# Patient Record
Sex: FEMALE | Race: WHITE | NOT HISPANIC OR LATINO | Employment: FULL TIME | URBAN - METROPOLITAN AREA
[De-identification: names, ages, dates, MRNs, and addresses within clinical notes are randomized per-mention and may not be internally consistent; named-entity substitution may affect disease eponyms.]

---

## 2017-01-31 ENCOUNTER — ANTICOAGULATION THERAPY VISIT (OUTPATIENT)
Dept: ANTICOAGULATION | Facility: CLINIC | Age: 42
End: 2017-01-31
Payer: COMMERCIAL

## 2017-01-31 DIAGNOSIS — Z79.01 LONG TERM CURRENT USE OF ANTICOAGULANT: ICD-10-CM

## 2017-01-31 DIAGNOSIS — I63.50 CEREBRAL ARTERY OCCLUSION WITH CEREBRAL INFARCTION (H): Primary | ICD-10-CM

## 2017-01-31 DIAGNOSIS — D68.59 PRIMARY HYPERCOAGULABLE STATE (H): ICD-10-CM

## 2017-01-31 LAB — INR POINT OF CARE: 1.6 (ref 0.86–1.14)

## 2017-01-31 PROCEDURE — 99207 ZZC NO CHARGE NURSE ONLY: CPT

## 2017-01-31 PROCEDURE — 85610 PROTHROMBIN TIME: CPT | Mod: QW

## 2017-01-31 PROCEDURE — 36416 COLLJ CAPILLARY BLOOD SPEC: CPT

## 2017-01-31 NOTE — PROGRESS NOTES
ANTICOAGULATION FOLLOW-UP CLINIC VISIT    Patient Name:  Belkis Christopher  Date:  1/31/2017  Contact Type:  Face to Face    SUBJECTIVE:     Patient Findings     Positives Missed doses (1-23 and 1-24)    Comments Patient was out of town in [place] early last week and forgot to take her warfarin twice in a row. She had it with her but just did not take it. This patient has an extensive history of missed doses, especially when traveling. Writer reminded patient, again, the importance of taking her warfarin on a daily basis.           OBJECTIVE    INR PROTIME   Date Value Ref Range Status   01/31/2017 1.6* 0.86 - 1.14 Final       ASSESSMENT / PLAN  INR assessment SUB missed doses   Recheck INR In: 3 WEEKS    INR Location Clinic      Anticoagulation Summary as of 1/31/2017     INR goal 2.0-3.0   Selected INR 1.6! (1/31/2017)   Maintenance plan 5 mg (5 mg x 1) every day   Full instructions 1/31: 7.5 mg; 2/1: 7.5 mg; Otherwise 5 mg every day   Weekly total 35 mg   Plan last modified Fallon Huber [facility] (6/23/2015)   Next INR check 2/21/2017   Priority INR   Target end date Indefinite    Indications   Long term current use of anticoagulant [Z79.01]  Cerebral artery occlusion with cerebral infarction (H) [I63.50]  Primary hypercoagulable state (H) [D68.59]         Anticoagulation Episode Summary     INR check location     Preferred lab     Send INR reminders to [facility]    Comments * CVA/TIA 2000 (carotid artery), also FVL (hetero)      Anticoagulation Care Providers     Provider Role Specialty Phone number    ShahAdileneKarina Sara,  [facility] Internal Medicine [phone]            See the Encounter Report to view Anticoagulation Flowsheet and Dosing Calendar (Go to Encounters tab in chart review, and find the Anticoagulation Therapy Visit)        Deb Roman RN

## 2017-01-31 NOTE — MR AVS SNAPSHOT
Belkis Ashwin   1/31/2017 5:00 PM   Anticoagulation Therapy Visit    Description:  41 year old female   Provider:  [facility]   Department:  [facility]           INR as of 1/31/2017     Selected INR 1.6! (1/31/2017)      Anticoagulation Summary as of 1/31/2017     INR goal 2.0-3.0   Selected INR 1.6! (1/31/2017)   Full instructions 1/31: 7.5 mg; 2/1: 7.5 mg; Otherwise 5 mg every day   Next INR check 2/21/2017    Indications   Long term current use of anticoagulant [Z79.01]  Cerebral artery occlusion with cerebral infarction (H) [I63.50]  Primary hypercoagulable state (H) [D68.59]         Description     Take 7.5 mg today and tomorrow. Then resume regular dosing. Remember to take warfarin consistently every day      Contact Numbers     Please call [phone] to cancel and/or reschedule your appointment.  Please call [phone] with any problems or questions regarding your therapy          January 2017 Details    Sun Mon Tue Wed u Fri Sat     1               2               3               4               5               6               7                 8               9               10               11               12               13               14                 15               16               17               18               19               20               21                 22               23               24               25               26               27               28                 29               30               31      7.5 mg   See details           Date Details   01/31 This INR check               How to take your warfarin dose     To take:  7.5 mg Take 1.5 of the 5 mg tablets.           February 2017 Details    Sun Mon Tue Wed Thu Fri Sat        1      7.5 mg         2      5 mg         3      5 mg         4      5 mg           5      5 mg         6      5 mg         7      5 mg         8      5 mg         9      5 mg         10      5 mg         11      5 mg           12       5 mg         13      5 mg         14      5 mg         15      5 mg         16      5 mg         17      5 mg         18      5 mg           19      5 mg         20      5 mg         21            22               23               24               25                 26               27               28                    Date Details   No additional details    Date of next INR:  2/21/2017         How to take your warfarin dose     To take:  5 mg Take 1 of the 5 mg tablets.    To take:  7.5 mg Take 1.5 of the 5 mg tablets.

## 2017-02-21 ENCOUNTER — ANTICOAGULATION THERAPY VISIT (OUTPATIENT)
Dept: ANTICOAGULATION | Facility: CLINIC | Age: 42
End: 2017-02-21
Payer: COMMERCIAL

## 2017-02-21 DIAGNOSIS — I63.50 CEREBRAL ARTERY OCCLUSION WITH CEREBRAL INFARCTION (H): ICD-10-CM

## 2017-02-21 DIAGNOSIS — D68.59 PRIMARY HYPERCOAGULABLE STATE (H): ICD-10-CM

## 2017-02-21 DIAGNOSIS — Z79.01 LONG TERM CURRENT USE OF ANTICOAGULANT: ICD-10-CM

## 2017-02-21 LAB — INR POINT OF CARE: 3 (ref 0.86–1.14)

## 2017-02-21 PROCEDURE — 99207 ZZC NO CHARGE NURSE ONLY: CPT

## 2017-02-21 PROCEDURE — 36416 COLLJ CAPILLARY BLOOD SPEC: CPT

## 2017-02-21 PROCEDURE — 85610 PROTHROMBIN TIME: CPT | Mod: QW

## 2017-02-21 NOTE — MR AVS SNAPSHOT
Belkis Christopher   2/21/2017 4:45 PM   Anticoagulation Therapy Visit    Description:  41 year old female   Provider:  [facility]   Department:  [facility]           INR as of 2/21/2017     Today's INR 3.0      Anticoagulation Summary as of 2/21/2017     INR goal 2.0-3.0   Today's INR 3.0   Full instructions 5 mg every day   Next INR check 4/4/2017    Indications   Long term current use of anticoagulant [Z79.01]  Cerebral artery occlusion with cerebral infarction (H) [I63.50]  Primary hypercoagulable state (H) [D68.59]         Your next Anticoagulation Clinic appointment(s)     Feb 21, 2017  4:45 PM CST   Anticoagulation Visit with [facility]   [facility]    [address] [address] [phone]              Contact Numbers     Please call [phone] to cancel and/or reschedule your appointment.  Please call [phone] with any problems or questions regarding your therapy          February 2017 Details    Sun Mon Tue Wed Thu Fri Sat        1               2               3               4                 5               6               7               8               9               10               11                 12               13               14               15               16               17               18                 19               20               21      5 mg   See details      22      5 mg         23      5 mg         24      5 mg         25      5 mg           26      5 mg         27      5 mg         28      5 mg              Date Details   02/21 This INR check               How to take your warfarin dose     To take:  5 mg Take 1 of the 5 mg tablets.           March 2017 Details    Sun Mon Tue Wed Thu Fri Sat        1      5 mg         2      5 mg         3      5 mg         4      5 mg           5      5 mg         6      5 mg         7      5 mg         8      5 mg         9      5 mg         10      5 mg          11      5 mg           12      5 mg         13      5 mg         14      5 mg         15      5 mg         16      5 mg         17      5 mg         18      5 mg           19      5 mg         20      5 mg         21      5 mg         22      5 mg         23      5 mg         24      5 mg         25      5 mg           26      5 mg         27      5 mg         28      5 mg         29      5 mg         30      5 mg         31      5 mg           Date Details   No additional details            How to take your warfarin dose     To take:  5 mg Take 1 of the 5 mg tablets.           April 2017 Details    Sun Mon Tue Wed Thu Fri Sat           1      5 mg           2      5 mg         3      5 mg         4            5               6               7               8                 9               10               11               12               13               14               15                 16               17               18               19               20               21               22                 23               24               25               26               27               28               29                 30                      Date Details   No additional details    Date of next INR:  4/4/2017         How to take your warfarin dose     To take:  5 mg Take 1 of the 5 mg tablets.

## 2017-02-21 NOTE — PROGRESS NOTES
ANTICOAGULATION FOLLOW-UP CLINIC VISIT    Patient Name:  Belkis Christopher  Date:  2/21/2017  Contact Type:  Face to Face    SUBJECTIVE:     Patient Findings     Positives Missed doses (2-18-17)           OBJECTIVE    INR Protime   Date Value Ref Range Status   02/21/2017 3.0 (A) 0.86 - 1.14 Final       ASSESSMENT / PLAN  INR assessment THER    Recheck INR In: 6 WEEKS    INR Location Clinic      Anticoagulation Summary as of 2/21/2017     INR goal 2.0-3.0   Today's INR 3.0   Maintenance plan 5 mg (5 mg x 1) every day   Full instructions 5 mg every day   Weekly total 35 mg   No change documented Deb Roman, RN   Plan last modified Fallon Huber [facility] (6/23/2015)   Next INR check 4/4/2017   Priority INR   Target end date Indefinite    Indications   Long term current use of anticoagulant [Z79.01]  Cerebral artery occlusion with cerebral infarction (H) [I63.50]  Primary hypercoagulable state (H) [D68.59]         Anticoagulation Episode Summary     INR check location     Preferred lab     Send INR reminders to [facility]    Comments * CVA/TIA 2000 (carotid artery), also FVL (hetero)      Anticoagulation Care Providers     Provider Role Specialty Phone number    Pablo Karina Shahrzad,  [facility] Internal Medicine [phone]            See the Encounter Report to view Anticoagulation Flowsheet and Dosing Calendar (Go to Encounters tab in chart review, and find the Anticoagulation Therapy Visit)        Deb Roman RN

## 2017-04-04 ENCOUNTER — ANTICOAGULATION THERAPY VISIT (OUTPATIENT)
Dept: ANTICOAGULATION | Facility: CLINIC | Age: 42
End: 2017-04-04
Payer: COMMERCIAL

## 2017-04-04 DIAGNOSIS — Z79.01 LONG TERM CURRENT USE OF ANTICOAGULANT: ICD-10-CM

## 2017-04-04 DIAGNOSIS — D68.59 PRIMARY HYPERCOAGULABLE STATE (H): ICD-10-CM

## 2017-04-04 DIAGNOSIS — I63.50 CEREBRAL ARTERY OCCLUSION WITH CEREBRAL INFARCTION (H): ICD-10-CM

## 2017-04-04 LAB — INR POINT OF CARE: 3.2 (ref 0.86–1.14)

## 2017-04-04 PROCEDURE — 99207 ZZC NO CHARGE NURSE ONLY: CPT

## 2017-04-04 PROCEDURE — 85610 PROTHROMBIN TIME: CPT | Mod: QW

## 2017-04-04 PROCEDURE — 36416 COLLJ CAPILLARY BLOOD SPEC: CPT

## 2017-04-04 NOTE — PROGRESS NOTES
ANTICOAGULATION FOLLOW-UP CLINIC VISIT    Patient Name:  Belkis Christopher  Date:  4/4/2017  Contact Type:  Face to Face    SUBJECTIVE:     Patient Findings     Positives Other complaints (neck is bothering her. she might have slept on it funny but is having difficulty moving her head normally due to pain)    Comments Patient will eat some greens tonight.           OBJECTIVE    INR Protime   Date Value Ref Range Status   04/04/2017 3.2 (A) 0.86 - 1.14 Final       ASSESSMENT / PLAN  INR assessment SUPRA    Recheck INR In: 6 WEEKS    INR Location Clinic      Anticoagulation Summary as of 4/4/2017     INR goal 2.0-3.0   Today's INR 3.2!   Maintenance plan 5 mg (5 mg x 1) every day   Full instructions 5 mg every day   Weekly total 35 mg   No change documented Deb Roman, RN   Plan last modified Fallon Huber[facility] (6/23/2015)   Next INR check 5/16/2017   Priority INR   Target end date Indefinite    Indications   Long term current use of anticoagulant [Z79.01]  Cerebral artery occlusion with cerebral infarction (H) [I63.50]  Primary hypercoagulable state (H) [D68.59]         Anticoagulation Episode Summary     INR check location     Preferred lab     Send INR reminders to [facility]    Comments * CVA/TIA 2000 (carotid artery), also FVL (hetero)      Anticoagulation Care Providers     Provider Role Specialty Phone number    ShahKarina dhillon,  [facility] Internal Medicine [phone]            See the Encounter Report to view Anticoagulation Flowsheet and Dosing Calendar (Go to Encounters tab in chart review, and find the Anticoagulation Therapy Visit)        Deb Roman RN

## 2017-04-04 NOTE — MR AVS SNAPSHOT
Belkisbuck Christopher   4/4/2017 4:45 PM   Anticoagulation Therapy Visit    Description:  41 year old female   Provider:  [facility]   Department:  [facility]           INR as of 4/4/2017     Today's INR 3.2!      Anticoagulation Summary as of 4/4/2017     INR goal 2.0-3.0   Today's INR 3.2!   Full instructions 5 mg every day   Next INR check 5/16/2017    Indications   Long term current use of anticoagulant [Z79.01]  Cerebral artery occlusion with cerebral infarction (H) [I63.50]  Primary hypercoagulable state (H) [D68.59]         Contact Numbers     Please call [phone] to cancel and/or reschedule your appointment.  Please call [phone] with any problems or questions regarding your therapy          April 2017 Details    Sun Mon Tue Wed Thu Fri Sat           1                 2               3               4      5 mg   See details      5      5 mg         6      5 mg         7      5 mg         8      5 mg           9      5 mg         10      5 mg         11      5 mg         12      5 mg         13      5 mg         14      5 mg         15      5 mg           16      5 mg         17      5 mg         18      5 mg         19      5 mg         20      5 mg         21      5 mg         22      5 mg           23      5 mg         24      5 mg         25      5 mg         26      5 mg         27      5 mg         28      5 mg         29      5 mg           30      5 mg                Date Details   04/04 This INR check               How to take your warfarin dose     To take:  5 mg Take 1 of the 5 mg tablets.           May 2017 Details    Sun Mon Tue Wed Thu Fri Sat      1      5 mg         2      5 mg         3      5 mg         4      5 mg         5      5 mg         6      5 mg           7      5 mg         8      5 mg         9      5 mg         10      5 mg         11      5 mg         12      5 mg         13      5 mg           14      5 mg         15      5 mg         16            17               18                19               20                 21               22               23               24               25               26               27                 28               29               30               31                   Date Details   No additional details    Date of next INR:  5/16/2017         How to take your warfarin dose     To take:  5 mg Take 1 of the 5 mg tablets.

## 2017-05-16 ENCOUNTER — ANTICOAGULATION THERAPY VISIT (OUTPATIENT)
Dept: ANTICOAGULATION | Facility: CLINIC | Age: 42
End: 2017-05-16
Payer: COMMERCIAL

## 2017-05-16 DIAGNOSIS — D68.59 PRIMARY HYPERCOAGULABLE STATE (H): ICD-10-CM

## 2017-05-16 DIAGNOSIS — Z79.01 LONG TERM CURRENT USE OF ANTICOAGULANT: ICD-10-CM

## 2017-05-16 DIAGNOSIS — I63.50 CEREBRAL ARTERY OCCLUSION WITH CEREBRAL INFARCTION (H): ICD-10-CM

## 2017-05-16 LAB — INR POINT OF CARE: 2.5 (ref 0.86–1.14)

## 2017-05-16 PROCEDURE — 85610 PROTHROMBIN TIME: CPT | Mod: QW

## 2017-05-16 PROCEDURE — 99207 ZZC NO CHARGE NURSE ONLY: CPT

## 2017-05-16 PROCEDURE — 36416 COLLJ CAPILLARY BLOOD SPEC: CPT

## 2017-05-16 NOTE — MR AVS SNAPSHOT
Belkis Ashwin   5/16/2017 4:45 PM   Anticoagulation Therapy Visit    Description:  41 year old female   Provider:  [facility]   Department:  [facility]           INR as of 5/16/2017     Today's INR 2.5      Anticoagulation Summary as of 5/16/2017     INR goal 2.0-3.0   Today's INR 2.5   Full instructions 5 mg every day   Next INR check 6/27/2017    Indications   Long term current use of anticoagulant [Z79.01]  Cerebral artery occlusion with cerebral infarction (H) [I63.50]  Primary hypercoagulable state (H) [D68.59]         Contact Numbers     Please call [phone] to cancel and/or reschedule your appointment.  Please call [phone] with any problems or questions regarding your therapy          May 2017 Details    Sun Mon Tue Wed Thu Fri Sat      1               2               3               4               5               6                 7               8               9               10               11               12               13                 14               15               16      5 mg   See details      17      5 mg         18      5 mg         19      5 mg         20      5 mg           21      5 mg         22      5 mg         23      5 mg         24      5 mg         25      5 mg         26      5 mg         27      5 mg           28      5 mg         29      5 mg         30      5 mg         31      5 mg             Date Details   05/16 This INR check               How to take your warfarin dose     To take:  5 mg Take 1 of the 5 mg tablets.           June 2017 Details    Sun Mon Tue Wed Thu Fri Sat         1      5 mg         2      5 mg         3      5 mg           4      5 mg         5      5 mg         6      5 mg         7      5 mg         8      5 mg         9      5 mg         10      5 mg           11      5 mg         12      5 mg         13      5 mg         14      5 mg         15      5 mg         16      5 mg         17      5 mg           18      5 mg         19       5 mg         20      5 mg         21      5 mg         22      5 mg         23      5 mg         24      5 mg           25      5 mg         26      5 mg         27            28               29               30                 Date Details   No additional details    Date of next INR:  6/27/2017         How to take your warfarin dose     To take:  5 mg Take 1 of the 5 mg tablets.

## 2017-05-16 NOTE — PROGRESS NOTES
ANTICOAGULATION FOLLOW-UP CLINIC VISIT    Patient Name:  Belkis Christopher  Date:  5/16/2017  Contact Type:  Face to Face    SUBJECTIVE:     Patient Findings     Positives Missed doses (patient forgot a dose last week and took an extra 2.5 mg the next day to help make up for the missed dose. Not sure what day was missed)           OBJECTIVE    INR Protime   Date Value Ref Range Status   05/16/2017 2.5 (A) 0.86 - 1.14 Final       ASSESSMENT / PLAN  INR assessment THER    Recheck INR In: 6 WEEKS    INR Location Clinic      Anticoagulation Summary as of 5/16/2017     INR goal 2.0-3.0   Today's INR 2.5   Maintenance plan 5 mg (5 mg x 1) every day   Full instructions 5 mg every day   Weekly total 35 mg   No change documented Deb Roman RN   Plan last modified Fallon Huber [facility] (6/23/2015)   Next INR check 6/27/2017   Priority INR   Target end date Indefinite    Indications   Long term current use of anticoagulant [Z79.01]  Cerebral artery occlusion with cerebral infarction (H) [I63.50]  Primary hypercoagulable state (H) [D68.59]         Anticoagulation Episode Summary     INR check location     Preferred lab     Send INR reminders to [facility]    Comments * CVA/TIA 2000 (carotid artery), also FVL (hetero)      Anticoagulation Care Providers     Provider Role Specialty Phone number    Karina Shah DO [facility] Internal Medicine [phone]            See the Encounter Report to view Anticoagulation Flowsheet and Dosing Calendar (Go to Encounters tab in chart review, and find the Anticoagulation Therapy Visit)        Deb Roman RN

## 2017-06-01 DIAGNOSIS — Z79.01 LONG TERM CURRENT USE OF ANTICOAGULANT: ICD-10-CM

## 2017-06-01 RX ORDER — WARFARIN SODIUM 5 MG/1
TABLET ORAL
Qty: 90 TABLET | Refills: 0 | Status: SHIPPED | OUTPATIENT
Start: 2017-06-01 | End: 2017-10-31

## 2017-06-01 NOTE — TELEPHONE ENCOUNTER
JANTOVEN 5MG    Last Written Prescription Date: 10/25/2016  Last Fill Qty: 10/26/2016, # refills: 0  Last Office Visit with G, P or [facility] prescribing provider: 12/13/2016       Date and Result of Last PT/INR:   Lab Results   Component Value Date    INR 2.5 05/16/2017    INR 3.2 04/04/2017    INR 2.9 08/17/2015        Liliane Jimenez  [facility]  [phone]

## 2017-06-27 ENCOUNTER — ANTICOAGULATION THERAPY VISIT (OUTPATIENT)
Dept: ANTICOAGULATION | Facility: CLINIC | Age: 42
End: 2017-06-27
Payer: COMMERCIAL

## 2017-06-27 DIAGNOSIS — D68.59 PRIMARY HYPERCOAGULABLE STATE (H): ICD-10-CM

## 2017-06-27 DIAGNOSIS — Z79.01 LONG TERM CURRENT USE OF ANTICOAGULANT: ICD-10-CM

## 2017-06-27 DIAGNOSIS — I63.50 CEREBRAL ARTERY OCCLUSION WITH CEREBRAL INFARCTION (H): ICD-10-CM

## 2017-06-27 LAB — INR POINT OF CARE: 2.1 (ref 0.86–1.14)

## 2017-06-27 PROCEDURE — 36416 COLLJ CAPILLARY BLOOD SPEC: CPT

## 2017-06-27 PROCEDURE — 99207 ZZC NO CHARGE NURSE ONLY: CPT

## 2017-06-27 PROCEDURE — 85610 PROTHROMBIN TIME: CPT | Mod: QW

## 2017-06-27 NOTE — MR AVS SNAPSHOT
Belkis Christopher   6/27/2017 4:45 PM   Anticoagulation Therapy Visit    Description:  41 year old female   Provider:  [facility]   Department:  [facility]           INR as of 6/27/2017     Today's INR 2.1      Anticoagulation Summary as of 6/27/2017     INR goal 2.0-3.0   Today's INR 2.1   Full instructions 5 mg every day   Next INR check 8/8/2017    Indications   Long term current use of anticoagulant [Z79.01]  Cerebral artery occlusion with cerebral infarction (H) [I63.50]  Primary hypercoagulable state (H) [D68.59]         Description     No change, recheck INR in 6 weeks.      Your next Anticoagulation Clinic appointment(s)     Aug 08, 2017  4:45 PM CDT   [facility]agulation [facility] with [facility]   [facility]    [address] [address] [phone]              Contact Numbers     Please call [phone] to cancel and/or reschedule your appointment.  Please call [phone] with any problems or questions regarding your therapy          June 2017 Details    Sun Mon Tue Wed Thu Fri Sat         1               2               3                 4               5               6               7               8               9               10                 11               12               13               14               15               16               17                 18               19               20               21               22               23               24                 25               26               27      5 mg   See details      28      5 mg         29      5 mg         30      5 mg           Date Details   06/27 This INR check               How to take your warfarin dose     To take:  5 mg Take 1 of the 5 mg tablets.           July 2017 Details    Sun Mon Tue Wed Thu Fri Sat           1      5 mg           2      5 mg         3      5 mg         4      5 mg         5      5 mg         6      5 mg         7      5  mg         8      5 mg           9      5 mg         10      5 mg         11      5 mg         12      5 mg         13      5 mg         14      5 mg         15      5 mg           16      5 mg         17      5 mg         18      5 mg         19      5 mg         20      5 mg         21      5 mg         22      5 mg           23      5 mg         24      5 mg         25      5 mg         26      5 mg         27      5 mg         28      5 mg         29      5 mg           30      5 mg         31      5 mg               Date Details   No additional details            How to take your warfarin dose     To take:  5 mg Take 1 of the 5 mg tablets.           August 2017 Details    Sun Mon Tue Wed Thu Fri Sat       1      5 mg         2      5 mg         3      5 mg         4      5 mg         5      5 mg           6      5 mg         7      5 mg         8            9               10               11               12                 13               14               15               16               17               18               19                 20               21               22               23               24               25               26                 27               28               29               30               31                  Date Details   No additional details    Date of next INR:  8/8/2017         How to take your warfarin dose     To take:  5 mg Take 1 of the 5 mg tablets.

## 2017-06-27 NOTE — PROGRESS NOTES
ANTICOAGULATION FOLLOW-UP CLINIC VISIT    Patient Name:  Belkis Christopher  Date:  6/27/2017  Contact Type:  Face to Face    SUBJECTIVE:     Patient Findings     Positives No Problem Findings (none reported)    Comments Med review done today.             OBJECTIVE    INR Protime   Date Value Ref Range Status   06/27/2017 2.1 (A) 0.86 - 1.14 Final       ASSESSMENT / PLAN  INR assessment THER    Recheck INR In: 6 WEEKS    INR Location Clinic      Anticoagulation Summary as of 6/27/2017     INR goal 2.0-3.0   Today's INR 2.1   Maintenance plan 5 mg (5 mg x 1) every day   Full instructions 5 mg every day   Weekly total 35 mg   No change documented Nidhi Mahan RN   Plan last modified Fallon Huber [facility] (6/23/2015)   Next INR check 8/8/2017   Priority INR   Target end date Indefinite    Indications   Long term current use of anticoagulant [Z79.01]  Cerebral artery occlusion with cerebral infarction (H) [I63.50]  Primary hypercoagulable state (H) [D68.59]         Anticoagulation Episode Summary     INR check location     Preferred lab     Send INR reminders to [facility]    Comments * CVA/TIA 2000 (carotid artery), also FVL (hetero)      Anticoagulation Care Providers     Provider Role Specialty Phone number    Karina Shah Shahrzad,  [facility] Internal Medicine [phone]            See the Encounter Report to view Anticoagulation Flowsheet and Dosing Calendar (Go to Encounters tab in chart review, and find the Anticoagulation Therapy Visit)    Nidhi Mahan, GEORGE

## 2017-07-28 DIAGNOSIS — E55.9 VITAMIN D DEFICIENCY: ICD-10-CM

## 2017-07-28 NOTE — TELEPHONE ENCOUNTER
VITAMIN D3 5000 UNIT      Last Written Prescription Date: 05/18/2016  Last Fill Quantity: 90,  # refills: 0   Last Office Visit with [facility], [facility] or [facility] prescribing provider: 12/13/2016    Liliane Jimenez  [facility]  [phone]

## 2017-08-15 ENCOUNTER — ANTICOAGULATION THERAPY VISIT (OUTPATIENT)
Dept: ANTICOAGULATION | Facility: CLINIC | Age: 42
End: 2017-08-15
Payer: COMMERCIAL

## 2017-08-15 DIAGNOSIS — I63.50 CEREBRAL ARTERY OCCLUSION WITH CEREBRAL INFARCTION (H): ICD-10-CM

## 2017-08-15 DIAGNOSIS — D68.59 PRIMARY HYPERCOAGULABLE STATE (H): ICD-10-CM

## 2017-08-15 DIAGNOSIS — Z79.01 LONG TERM CURRENT USE OF ANTICOAGULANT: ICD-10-CM

## 2017-08-15 LAB — INR POINT OF CARE: 2.2 (ref 0.86–1.14)

## 2017-08-15 PROCEDURE — 85610 PROTHROMBIN TIME: CPT | Mod: QW

## 2017-08-15 PROCEDURE — 99207 ZZC NO CHARGE NURSE ONLY: CPT

## 2017-08-15 PROCEDURE — 36416 COLLJ CAPILLARY BLOOD SPEC: CPT

## 2017-08-15 NOTE — MR AVS SNAPSHOT
Belkis Ashwin   8/15/2017 4:30 PM   Anticoagulation Therapy Visit    Description:  41 year old female   Provider:  [facility]   Department:  [facility]           INR as of 8/15/2017     Today's INR 2.2      Anticoagulation Summary as of 8/15/2017     INR goal 2.0-3.0   Today's INR 2.2   Full instructions 5 mg every day   Next INR check 9/26/2017    Indications   Long term current use of anticoagulant [Z79.01]  Cerebral artery occlusion with cerebral infarction (H) [I63.50]  Primary hypercoagulable state (H) [D68.59]         Contact Numbers     Please call [phone] to cancel and/or reschedule your appointment.  Please call [phone] with any problems or questions regarding your therapy          August 2017 Details    Sun Mon Tue Wed Thu Fri Sat       1               2               3               4               5                 6               7               8               9               10               11               12                 13               14               15      5 mg   See details      16      5 mg         17      5 mg         18      5 mg         19      5 mg           20      5 mg         21      5 mg         22      5 mg         23      5 mg         24      5 mg         25      5 mg         26      5 mg           27      5 mg         28      5 mg         29      5 mg         30      5 mg         31      5 mg            Date Details   08/15 This INR check               How to take your warfarin dose     To take:  5 mg Take 1 of the 5 mg tablets.           September 2017 Details    Sun Mon Tue Wed Thu Fri Sat          1      5 mg         2      5 mg           3      5 mg         4      5 mg         5      5 mg         6      5 mg         7      5 mg         8      5 mg         9      5 mg           10      5 mg         11      5 mg         12      5 mg         13      5 mg         14      5 mg         15      5 mg         16      5 mg           17      5 mg         18      5  mg         19      5 mg         20      5 mg         21      5 mg         22      5 mg         23      5 mg           24      5 mg         25      5 mg         26            27               28               29               30                Date Details   No additional details    Date of next INR:  9/26/2017         How to take your warfarin dose     To take:  5 mg Take 1 of the 5 mg tablets.

## 2017-08-15 NOTE — PROGRESS NOTES
ANTICOAGULATION FOLLOW-UP CLINIC VISIT    Patient Name:  Belkis Christopher  Date:  8/15/2017  Contact Type:  Face to Face    SUBJECTIVE:     Patient Findings     Positives Missed doses (8-8 and 8-9)           OBJECTIVE    INR Protime   Date Value Ref Range Status   08/15/2017 2.2 (A) 0.86 - 1.14 Final       ASSESSMENT / PLAN  INR assessment THER    Recheck INR In: 6 WEEKS    INR Location Clinic      Anticoagulation Summary as of 8/15/2017     INR goal 2.0-3.0   Today's INR 2.2   Maintenance plan 5 mg (5 mg x 1) every day   Full instructions 5 mg every day   Weekly total 35 mg   No change documented Deb Roman, RN   Plan last modified Fallon Huber [facility] (6/23/2015)   Next INR check 9/26/2017   Priority INR   Target end date Indefinite    Indications   Long term current use of anticoagulant [Z79.01]  Cerebral artery occlusion with cerebral infarction (H) [I63.50]  Primary hypercoagulable state (H) [D68.59]         Anticoagulation Episode Summary     INR check location     Preferred lab     Send INR reminders to [facility]    Comments * CVA/TIA 2000 (carotid artery), also FVL (hetero). patient frequently misses doses when out of town        Anticoagulation Care Providers     Provider Role Specialty Phone number    ShahKarina,  [facility] Internal Medicine [phone]            See the Encounter Report to view Anticoagulation Flowsheet and Dosing Calendar (Go to Encounters tab in chart review, and find the Anticoagulation Therapy Visit)        Deb Roman RN

## 2017-09-19 ENCOUNTER — ANTICOAGULATION THERAPY VISIT (OUTPATIENT)
Dept: ANTICOAGULATION | Facility: CLINIC | Age: 42
End: 2017-09-19
Payer: COMMERCIAL

## 2017-09-19 DIAGNOSIS — D68.59 PRIMARY HYPERCOAGULABLE STATE (H): ICD-10-CM

## 2017-09-19 DIAGNOSIS — I63.50 CEREBRAL ARTERY OCCLUSION WITH CEREBRAL INFARCTION (H): ICD-10-CM

## 2017-09-19 DIAGNOSIS — Z79.01 LONG TERM CURRENT USE OF ANTICOAGULANT: ICD-10-CM

## 2017-09-19 LAB — INR POINT OF CARE: 2 (ref 0.86–1.14)

## 2017-09-19 PROCEDURE — 36416 COLLJ CAPILLARY BLOOD SPEC: CPT

## 2017-09-19 PROCEDURE — 99207 ZZC NO CHARGE NURSE ONLY: CPT

## 2017-09-19 PROCEDURE — 85610 PROTHROMBIN TIME: CPT | Mod: QW

## 2017-09-19 NOTE — PROGRESS NOTES
ANTICOAGULATION FOLLOW-UP CLINIC VISIT    Patient Name:  Belkis Christopher  Date:  9/19/2017  Contact Type:  Face to Face    SUBJECTIVE:     Patient Findings     Positives No Problem Findings    Comments Patient might have been eating more leaf lettuce from her garden but this will be stopping soon. Will keep dosing the same for now but if low in 6 weeks, patient might benefit from a slight increase in dosing since she does miss warfarin doses periodically when traveling out of town.           OBJECTIVE    INR Protime   Date Value Ref Range Status   09/19/2017 2.0 (A) 0.86 - 1.14 Final       ASSESSMENT / PLAN  INR assessment THER    Recheck INR In: 6 WEEKS    INR Location Clinic      Anticoagulation Summary as of 9/19/2017     INR goal 2.0-3.0   Today's INR 2.0   Maintenance plan 5 mg (5 mg x 1) every day   Full instructions 5 mg every day   Weekly total 35 mg   No change documented Deb Roman, RN   Plan last modified Fallon Huber [facility] (6/23/2015)   Next INR check 10/31/2017   Priority INR   Target end date Indefinite    Indications   Long term current use of anticoagulant [Z79.01]  Cerebral artery occlusion with cerebral infarction (H) [I63.50]  Primary hypercoagulable state (H) [D68.59]         Anticoagulation Episode Summary     INR check location     Preferred lab     Send INR reminders to [facility]    Comments * CVA/TIA 2000 (carotid artery), also FVL (hetero). patient frequently misses doses when out of town        Anticoagulation Care Providers     Provider Role Specialty Phone number    Karina Shah,  [facility] Internal Medicine [phone]            See the Encounter Report to view Anticoagulation Flowsheet and Dosing Calendar (Go to Encounters tab in chart review, and find the Anticoagulation Therapy Visit)        Deb Roman, RN

## 2017-09-19 NOTE — MR AVS SNAPSHOT
Belkis Ashwin   9/19/2017 4:45 PM   Anticoagulation Therapy Visit    Description:  41 year old female   Provider:  [facility]   Department:  [facility]           INR as of 9/19/2017     Today's INR 2.0      Anticoagulation Summary as of 9/19/2017     INR goal 2.0-3.0   Today's INR 2.0   Full instructions 5 mg every day   Next INR check 10/31/2017    Indications   Long term current use of anticoagulant [Z79.01]  Cerebral artery occlusion with cerebral infarction (H) [I63.50]  Primary hypercoagulable state (H) [D68.59]         Contact Numbers     Please call [phone] to cancel and/or reschedule your appointment.  Please call [phone] with any problems or questions regarding your therapy          September 2017 Details    Sun Mon Tue Wed Thu Fri Sat          1               2                 3               4               5               6               7               8               9                 10               11               12               13               14               15               16                 17               18               19      5 mg   See details      20      5 mg         21      5 mg         22      5 mg         23      5 mg           24      5 mg         25      5 mg         26      5 mg         27      5 mg         28      5 mg         29      5 mg         30      5 mg          Date Details   09/19 This INR check               How to take your warfarin dose     To take:  5 mg Take 1 of the 5 mg tablets.           October 2017 Details    Sun Mon Tue Wed Thu Fri Sat     1      5 mg         2      5 mg         3      5 mg         4      5 mg         5      5 mg         6      5 mg         7      5 mg           8      5 mg         9      5 mg         10      5 mg         11      5 mg         12      5 mg         13      5 mg         14      5 mg           15      5 mg         16      5 mg         17      5 mg         18      5 mg         19      5 mg         20      5  mg         21      5 mg           22      5 mg         23      5 mg         24      5 mg         25      5 mg         26      5 mg         27      5 mg         28      5 mg           29      5 mg         30      5 mg         31                 Date Details   No additional details    Date of next INR:  10/31/2017         How to take your warfarin dose     To take:  5 mg Take 1 of the 5 mg tablets.

## 2017-10-31 ENCOUNTER — ANTICOAGULATION THERAPY VISIT (OUTPATIENT)
Dept: ANTICOAGULATION | Facility: CLINIC | Age: 42
End: 2017-10-31
Payer: COMMERCIAL

## 2017-10-31 DIAGNOSIS — Z79.01 LONG TERM CURRENT USE OF ANTICOAGULANT: ICD-10-CM

## 2017-10-31 DIAGNOSIS — D68.59 PRIMARY HYPERCOAGULABLE STATE (H): ICD-10-CM

## 2017-10-31 DIAGNOSIS — I63.50 CEREBRAL ARTERY OCCLUSION WITH CEREBRAL INFARCTION (H): ICD-10-CM

## 2017-10-31 LAB — INR POINT OF CARE: 3.1 (ref 0.86–1.14)

## 2017-10-31 PROCEDURE — 36416 COLLJ CAPILLARY BLOOD SPEC: CPT

## 2017-10-31 PROCEDURE — 99207 ZZC NO CHARGE NURSE ONLY: CPT

## 2017-10-31 PROCEDURE — 85610 PROTHROMBIN TIME: CPT | Mod: QW

## 2017-10-31 RX ORDER — WARFARIN SODIUM 5 MG/1
TABLET ORAL
Qty: 90 TABLET | Refills: 0 | Status: SHIPPED | OUTPATIENT
Start: 2017-10-31 | End: 2018-03-06

## 2017-10-31 NOTE — MR AVS SNAPSHOT
Belkis Christopher   10/31/2017 4:45 PM   Anticoagulation Therapy Visit    Description:  41 year old female   Provider:  [facility]   Department:  [facility]           INR as of 10/31/2017     Today's INR 3.1!      Anticoagulation Summary as of 10/31/2017     INR goal 2.0-3.0   Today's INR 3.1!   Full instructions 5 mg every day   Next INR check 12/12/2017    Indications   Long term current use of anticoagulant [Z79.01]  Cerebral artery occlusion with cerebral infarction (H) [I63.50]  Primary hypercoagulable state (H) [D68.59]         Your next Anticoagulation Clinic appointment(s)     Dec 12, 2017  4:30 PM CST   [facility]    [address] [address] [phone]              Contact Numbers     Please call [phone] to cancel and/or reschedule your appointment.  Please call [phone] with any problems or questions regarding your therapy          October 2017 Details    Sun Mon Tue Wed Thu Fri Sat     1               2               3               4               5               6               7                 8               9               10               11               12               13               14                 15               16               17               18               19               20               21                 22               23               24               25               26               27               28                 29               30               31      5 mg   See details           Date Details   10/31 This INR check               How to take your warfarin dose     To take:  5 mg Take 1 of the 5 mg tablets.           November 2017 Details    Sun Mon Tue Wed Thu Fri Sat        1      5 mg         2      5 mg         3      5 mg         4      5 mg           5      5 mg         6      5 mg         7      5 mg         8      5 mg         9      5 mg          10      5 mg         11      5 mg           12      5 mg         13      5 mg         14      5 mg         15      5 mg         16      5 mg         17      5 mg         18      5 mg           19      5 mg         20      5 mg         21      5 mg         22      5 mg         23      5 mg         24      5 mg         25      5 mg           26      5 mg         27      5 mg         28      5 mg         29      5 mg         30      5 mg            Date Details   No additional details            How to take your warfarin dose     To take:  5 mg Take 1 of the 5 mg tablets.           December 2017 Details    Sun Mon Tue Wed Thu Fri Sat          1      5 mg         2      5 mg           3      5 mg         4      5 mg         5      5 mg         6      5 mg         7      5 mg         8      5 mg         9      5 mg           10      5 mg         11      5 mg         12            13               14               15               16                 17               18               19               20               21               22               23                 24               25               26               27               28               29               30                 31                      Date Details   No additional details    Date of next INR:  12/12/2017         How to take your warfarin dose     To take:  5 mg Take 1 of the 5 mg tablets.

## 2017-10-31 NOTE — PROGRESS NOTES
ANTICOAGULATION FOLLOW-UP CLINIC VISIT    Patient Name:  Belkis Christopher  Date:  10/31/2017  Contact Type:  Face to Face    SUBJECTIVE:     Patient Findings     Positives Missed doses, No Problem Findings    Comments Pt reports she missed a dose of warfarin on Saturday night, so took 7.5 mg Mark 10/29 and Monday 10/30, also reports she is eating less fresh greens as no longer has food from her garden.           OBJECTIVE    INR Protime   Date Value Ref Range Status   10/31/2017 3.1 (A) 0.86 - 1.14 Final       ASSESSMENT / PLAN  INR assessment THER    Recheck INR In: 6 WEEKS    INR Location Clinic      Anticoagulation Summary as of 10/31/2017     INR goal 2.0-3.0   Today's INR 3.1!   Maintenance plan 5 mg (5 mg x 1) every day   Full instructions 5 mg every day   Weekly total 35 mg   No change documented Vaishali Justin RN   Plan last modified Fallon Huber [facility] (6/23/2015)   Next INR check 12/12/2017   Priority INR   Target end date Indefinite    Indications   Long term current use of anticoagulant [Z79.01]  Cerebral artery occlusion with cerebral infarction (H) [I63.50]  Primary hypercoagulable state (H) [D68.59]         Anticoagulation Episode Summary     INR check location     Preferred lab     Send INR reminders to [facility]    Comments * CVA/TIA 2000 (carotid artery), also FVL (hetero). patient frequently misses doses when out of town        Anticoagulation Care Providers     Provider Role Specialty Phone number    Karina Shah,  [facility] Internal Medicine [phone]            See the Encounter Report to view Anticoagulation Flowsheet and Dosing Calendar (Go to Encounters tab in chart review, and find the Anticoagulation Therapy Visit)        Vaishali Justin, RN

## 2017-12-12 ENCOUNTER — ANTICOAGULATION THERAPY VISIT (OUTPATIENT)
Dept: ANTICOAGULATION | Facility: CLINIC | Age: 42
End: 2017-12-12
Payer: COMMERCIAL

## 2017-12-12 DIAGNOSIS — Z79.01 LONG TERM CURRENT USE OF ANTICOAGULANT: ICD-10-CM

## 2017-12-12 DIAGNOSIS — D68.59 PRIMARY HYPERCOAGULABLE STATE (H): ICD-10-CM

## 2017-12-12 DIAGNOSIS — I63.50 CEREBRAL ARTERY OCCLUSION WITH CEREBRAL INFARCTION (H): ICD-10-CM

## 2017-12-12 LAB — INR POINT OF CARE: 1.6 (ref 0.86–1.14)

## 2017-12-12 PROCEDURE — 85610 PROTHROMBIN TIME: CPT | Mod: QW

## 2017-12-12 PROCEDURE — 36416 COLLJ CAPILLARY BLOOD SPEC: CPT

## 2017-12-12 PROCEDURE — 99207 ZZC NO CHARGE NURSE ONLY: CPT

## 2017-12-12 NOTE — MR AVS SNAPSHOT
Belkis Christopher   12/12/2017 4:30 PM   Anticoagulation Therapy Visit    Description:  42 year old female   Provider:  [facility]   Department:  [facility]           INR as of 12/12/2017     Today's INR       Anticoagulation Summary as of 12/12/2017     INR goal 2.0-3.0   Today's INR    Full instructions 12/12: 7.5 mg; Otherwise 5 mg every day   Next INR check 12/19/2017    Indications   Long term current use of anticoagulant [Z79.01]  Cerebral artery occlusion with cerebral infarction (H) [I63.50]  Primary hypercoagulable state (H) [D68.59]         Your next Anticoagulation Clinic appointment(s)     Dec 19, 2017  4:45 PM CST   [facility]   [facility]    [address] [id number] [phone]              Contact Numbers     Please call [phone] to cancel and/or reschedule your appointment.  Please call [phone] with any problems or questions regarding your therapy          December 2017 Details    Sun Mon Tue Wed Thu Fri Sat          1               2                 3               4               5               6               7               8               9                 10               11               12      7.5 mg   See details      13      5 mg         14      5 mg         15      5 mg         16      5 mg           17      5 mg         18      5 mg         19            20               21               22               23                 24               25               26               27               28               29               30                 31                      Date Details   12/12 This INR check       Date of next INR:  12/19/2017         How to take your warfarin dose     To take:  5 mg Take 1 of the 5 mg tablets.    To take:  7.5 mg Take 1.5 of the 5 mg tablets.

## 2017-12-12 NOTE — PROGRESS NOTES
ANTICOAGULATION FOLLOW-UP CLINIC VISIT    Patient Name:  Belkis Christopher  Date:  12/12/2017  Contact Type:  Face to Face    SUBJECTIVE:     Patient Findings     Positives Extra doses (took extra 2.5 mg 12/8 & 12/9 to make up for missed ones)    Comments Missed doses - Missed 2 doses 12/5 & 12/6 when out of town  Pt denies changes in diet, health or activity, states she has a hard time remembering to take meds when she is out of town, often forgets to bring medication with her.  Writer instructed pt to keep a few doses in her purse so she will always have it with her.  Pt voiced understanding. Writer instructed pt to increase dose today and then return to maintenance dose.             OBJECTIVE    INR Protime   Date Value Ref Range Status   12/12/2017 1.6 (A) 0.86 - 1.14 Final       ASSESSMENT / PLAN  INR assessment SUB    Recheck INR In: 1 WEEK    INR Location Clinic      Anticoagulation Summary as of 12/12/2017     INR goal 2.0-3.0   Today's INR 1.6!   Maintenance plan 5 mg (5 mg x 1) every day   Full instructions 12/12: 7.5 mg; Otherwise 5 mg every day   Weekly total 35 mg   Plan last modified Fallon Huber, [facility] (6/23/2015)   Next INR check 12/19/2017   Priority INR   Target end date Indefinite    Indications   Long term current use of anticoagulant [Z79.01]  Cerebral artery occlusion with cerebral infarction (H) [I63.50]  Primary hypercoagulable state (H) [D68.59]         Anticoagulation Episode Summary     INR check location     Preferred lab     Send INR reminders to [facility]    Comments * CVA/TIA 2000 (carotid artery), also FVL (hetero). patient frequently misses doses when out of town        Anticoagulation Care Providers     Provider Role Specialty Phone number    Karina Shah,  [facility] Internal Medicine [phone]            See the Encounter Report to view Anticoagulation Flowsheet and Dosing Calendar (Go to Encounters tab in chart review, and find the Anticoagulation  Therapy Visit)        Vaishali Justin RN

## 2017-12-19 ENCOUNTER — ANTICOAGULATION THERAPY VISIT (OUTPATIENT)
Dept: ANTICOAGULATION | Facility: CLINIC | Age: 42
End: 2017-12-19
Payer: COMMERCIAL

## 2017-12-19 DIAGNOSIS — I63.50 CEREBRAL ARTERY OCCLUSION WITH CEREBRAL INFARCTION (H): ICD-10-CM

## 2017-12-19 DIAGNOSIS — Z79.01 LONG TERM CURRENT USE OF ANTICOAGULANT: ICD-10-CM

## 2017-12-19 DIAGNOSIS — D68.59 PRIMARY HYPERCOAGULABLE STATE (H): ICD-10-CM

## 2017-12-19 LAB — INR POINT OF CARE: 2.9 (ref 0.86–1.14)

## 2017-12-19 PROCEDURE — 85610 PROTHROMBIN TIME: CPT | Mod: QW

## 2017-12-19 PROCEDURE — 99207 ZZC NO CHARGE NURSE ONLY: CPT

## 2017-12-19 PROCEDURE — 36416 COLLJ CAPILLARY BLOOD SPEC: CPT

## 2017-12-19 NOTE — PROGRESS NOTES
ANTICOAGULATION FOLLOW-UP CLINIC VISIT    Patient Name:  Belkis Christopher  Date:  12/19/2017  Contact Type:  Face to Face    SUBJECTIVE:     Patient Findings     Positives No Problem Findings    Comments Pt denies changes in medications, diet, activity or health, reports taking warfarin as directed.             OBJECTIVE    INR Protime   Date Value Ref Range Status   12/19/2017 2.9 (A) 0.86 - 1.14 Final       ASSESSMENT / PLAN  INR assessment THER    Recheck INR In: 5 WEEKS    INR Location Clinic      Anticoagulation Summary as of 12/19/2017     INR goal 2.0-3.0   Today's INR 2.9   Maintenance plan 5 mg (5 mg x 1) every day   Full instructions 5 mg every day   Weekly total 35 mg   Plan last modified Fallon Huber [facility] (6/23/2015)   Next INR check 1/23/2018   Priority INR   Target end date Indefinite    Indications   Long term current use of anticoagulant [Z79.01]  Cerebral artery occlusion with cerebral infarction (H) [I63.50]  Primary hypercoagulable state (H) [D68.59]         Anticoagulation Episode Summary     INR check location     Preferred lab     Send INR reminders to [facility]    Comments * CVA/TIA 2000 (carotid artery), also FVL (hetero). patient frequently misses doses when out of town        Anticoagulation Care Providers     Provider Role Specialty Phone number    ShahKarina dhillon, DO [facility] Internal Medicine [phone]            See the Encounter Report to view Anticoagulation Flowsheet and Dosing Calendar (Go to Encounters tab in chart review, and find the Anticoagulation Therapy Visit)        Vaishali Justin RN

## 2018-01-23 ENCOUNTER — ANTICOAGULATION THERAPY VISIT (OUTPATIENT)
Dept: ANTICOAGULATION | Facility: CLINIC | Age: 43
End: 2018-01-23
Payer: COMMERCIAL

## 2018-01-23 DIAGNOSIS — Z79.01 LONG TERM CURRENT USE OF ANTICOAGULANT: ICD-10-CM

## 2018-01-23 DIAGNOSIS — I63.50 CEREBRAL ARTERY OCCLUSION WITH CEREBRAL INFARCTION (H): ICD-10-CM

## 2018-01-23 DIAGNOSIS — D68.59 PRIMARY HYPERCOAGULABLE STATE (H): ICD-10-CM

## 2018-01-23 LAB — INR POINT OF CARE: 2.7 (ref 0.86–1.14)

## 2018-01-23 PROCEDURE — 36416 COLLJ CAPILLARY BLOOD SPEC: CPT

## 2018-01-23 PROCEDURE — 85610 PROTHROMBIN TIME: CPT | Mod: QW

## 2018-01-23 PROCEDURE — 99207 ZZC NO CHARGE NURSE ONLY: CPT

## 2018-01-23 NOTE — PROGRESS NOTES
ANTICOAGULATION FOLLOW-UP CLINIC VISIT    Patient Name:  Belkis Christopher  Date:  1/23/2018  Contact Type:  Face to Face    SUBJECTIVE:     Patient Findings     Positives Missed doses (missed T 1/16 & W 1/17, took an extra 2.5 mg Th 1/18 and 1/19)    Comments Pt denies changes in diet, medications, activity or health.           OBJECTIVE    INR Protime   Date Value Ref Range Status   01/23/2018 2.7 (A) 0.86 - 1.14 Final       ASSESSMENT / PLAN  INR assessment THER    Recheck INR In: 6 WEEKS    INR Location Clinic      Anticoagulation Summary as of 1/23/2018     INR goal 2.0-3.0   Today's INR 2.7   Maintenance plan 5 mg (5 mg x 1) every day   Full instructions 5 mg every day   Weekly total 35 mg   No change documented Vaishali Justin RN   Plan last modified Fallon Huber, [facility] (6/23/2015)   Next INR check 3/6/2018   Priority INR   Target end date Indefinite    Indications   Long term current use of anticoagulant [Z79.01]  Cerebral artery occlusion with cerebral infarction (H) [I63.50]  Primary hypercoagulable state (H) [D68.59]         Anticoagulation Episode Summary     INR check location     Preferred lab     Send INR reminders to [facility]    Comments * CVA/TIA 2000 (carotid artery), also FVL (hetero). patient frequently misses doses when out of town        Anticoagulation Care Providers     Provider Role Specialty Phone number    Karina Shah,  [facility] Internal Medicine [phone]            See the Encounter Report to view Anticoagulation Flowsheet and Dosing Calendar (Go to Encounters tab in chart review, and find the Anticoagulation Therapy Visit)        Vaishali Justin, GEORGE

## 2018-01-23 NOTE — MR AVS SNAPSHOT
Belkis Christopher   1/23/2018 4:45 PM   Anticoagulation Therapy Visit    Description:  42 year old female   Provider:  [facility]   Department:  [facility]           INR as of 1/23/2018     Today's INR 2.7      Anticoagulation Summary as of 1/23/2018     INR goal 2.0-3.0   Today's INR 2.7   Full instructions 5 mg every day   Next INR check 3/6/2018    Indications   Long term current use of anticoagulant [Z79.01]  Cerebral artery occlusion with cerebral infarction (H) [I63.50]  Primary hypercoagulable state (H) [D68.59]         Your next Anticoagulation Clinic appointment(s)     Mar 06, 2018  4:30 PM CST   [facility]agulation Visit with [facility]   [facility]    [address] [address] [phone]              Contact Numbers     Please call [phone] with any problems or questions regarding your therapy.    If you need to cancel and/or reschedule your appointment please call one of the following numbers:  [facility] [phone]  [place] - [phone]  [place] - [phone]  [facility] [phone]  [place] - [phone]            January 2018 Details    Sun Mon Tue Wed Thu Fri Sat      1               2               3               4               5               6                 7               8               9               10               11               12               13                 14               15               16               17               18               19               20                 21               22               23      5 mg   See details      24      5 mg         25      5 mg         26      5 mg         27      5 mg           28      5 mg         29      5 mg         30      5 mg         31      5 mg             Date Details   01/23 This INR check               How to take your warfarin dose     To take:  5 mg Take 1 of the 5 mg tablets.           February 2018 Details    Sun Mon Tue Wed Thu  Fri Sat         1      5 mg         2      5 mg         3      5 mg           4      5 mg         5      5 mg         6      5 mg         7      5 mg         8      5 mg         9      5 mg         10      5 mg           11      5 mg         12      5 mg         13      5 mg         14      5 mg         15      5 mg         16      5 mg         17      5 mg           18      5 mg         19      5 mg         20      5 mg         21      5 mg         22      5 mg         23      5 mg         24      5 mg           25      5 mg         26      5 mg         27      5 mg         28      5 mg             Date Details   No additional details            How to take your warfarin dose     To take:  5 mg Take 1 of the 5 mg tablets.           March 2018 Details    Sun Mon Tue Wed Thu Fri Sat         1      5 mg         2      5 mg         3      5 mg           4      5 mg         5      5 mg         6            7               8               9               10                 11               12               13               14               15               16               17                 18               19               20               21               22               23               24                 25               26               27               28               29               30               31                Date Details   No additional details    Date of next INR:  3/6/2018         How to take your warfarin dose     To take:  5 mg Take 1 of the 5 mg tablets.

## 2018-03-06 DIAGNOSIS — Z79.01 LONG TERM CURRENT USE OF ANTICOAGULANT: ICD-10-CM

## 2018-03-06 DIAGNOSIS — E55.9 VITAMIN D DEFICIENCY: ICD-10-CM

## 2018-03-06 DIAGNOSIS — I63.50 CEREBRAL ARTERY OCCLUSION WITH CEREBRAL INFARCTION (H): ICD-10-CM

## 2018-03-07 NOTE — TELEPHONE ENCOUNTER
"Requested Prescriptions   Pending Prescriptions Disp Refills     warfarin (COUMADIN) 5 MG tablet  Last Written Prescription Date:  10/31/2017  Last Fill Quantity: 90,  # refills: 0   Last office visit: 12/13/2016 with prescribing provider:  Pablo   Future Office Visit:     90 tablet 0     Sig: As directed by Anticoagulation Clinic (current dose 5mg daily)    Vitamin K Antagonists Failed    3/6/2018  8:13 PM       Failed - INR is within goal in the past 6 weeks    Confirm INR is within goal in the past 6 weeks.     Recent Labs   Lab Test 01/23/18   INR  2.7*                      Passed - Recent (12 mo) or future (30 days) visit within the authorizing provider's specialty    Patient had office visit in the last year or has a visit in the next 30 days with authorizing provider.  See \"Patient Info\" tab in inbasket, or \"Choose Columns\" in Meds & Orders section of the refill encounter.            Passed - Patient is 18 years of age or older       Passed - Patient is not pregnant       Passed - No positive pregnancy on file in past 12 months        Zeb Lima RT (R)    "

## 2018-03-07 NOTE — TELEPHONE ENCOUNTER
"Requested Prescriptions   Pending Prescriptions Disp Refills     atorvastatin (LIPITOR) 40 MG tablet  Last Written Prescription Date:  12/13/2016  Last Fill Quantity: 90,  # refills: 3   Last office visit: 12/13/2016 with prescribing provider:  Pablo   Future Office Visit:     90 tablet 3     Sig: Take 1 tablet (40 mg) by mouth daily    Statins Protocol Failed    3/6/2018  8:12 PM       Failed - LDL on file in past 12 months    Recent Labs   Lab Test  12/09/16   0605   LDL  140*            Passed - No abnormal creatine kinase in past 12 months    No lab results found.         Passed - Recent (12 mo) or future (30 days) visit within the authorizing provider's specialty    Patient had office visit in the last year or has a visit in the next 30 days with authorizing provider.  See \"Patient Info\" tab in inbasket, or \"Choose Columns\" in Meds & Orders section of the refill encounter.            Passed - Patient is age 18 or older       Passed - No active pregnancy on record       Passed - No positive pregnancy test in past 12 months        Zeb Lima RT (R)    "

## 2018-03-07 NOTE — TELEPHONE ENCOUNTER
"Requested Prescriptions   Pending Prescriptions Disp Refills     cholecalciferol (D 5000) 5000 UNITS CAPS  Last Written Prescription Date:  08/04/2017  Last Fill Quantity: 90,  # refills: 0   Last office visit: 12/13/2016 with prescribing provider:  Pablo   Future Office Visit:     90 capsule 0     Sig: Take 1 capsule (5,000 Units) by mouth daily    Vitamin Supplements (Adult) Protocol Passed    3/6/2018  8:11 PM       Passed - High dose Vitamin D not ordered       Passed - Recent (12 mo) or future (30 days) visit within the authorizing provider's specialty    Patient had office visit in the last year or has a visit in the next 30 days with authorizing provider.  See \"Patient Info\" tab in inbasket, or \"Choose Columns\" in Meds & Orders section of the refill encounter.             Zeb Lima RT (R)    "

## 2018-03-08 RX ORDER — ATORVASTATIN CALCIUM 40 MG/1
40 TABLET, FILM COATED ORAL DAILY
Qty: 30 TABLET | Refills: 0 | Status: SHIPPED | OUTPATIENT
Start: 2018-03-08 | End: 2018-05-02

## 2018-03-08 RX ORDER — WARFARIN SODIUM 5 MG/1
TABLET ORAL
Qty: 30 TABLET | Refills: 0 | Status: SHIPPED | OUTPATIENT
Start: 2018-03-08 | End: 2018-04-24

## 2018-03-08 NOTE — TELEPHONE ENCOUNTER
Signed Prescriptions:                        Disp   Refills    warfarin (COUMADIN) 5 MG tablet            30 tab*0        Sig: As directed by Anticoagulation Clinic (current dose           5mg daily). Please see pharm notes below.  Authorizing Provider: TIMUR MONTOYA  Ordering User: HECTOR ROMAN    Medication is being filled for 1 time refill only due to:  Patient needs to be seen because it has been more than one year since last visit.     Hector Roman, BSN, RN

## 2018-03-08 NOTE — TELEPHONE ENCOUNTER
Routing refill request to provider for review/approval because:  A break in medication  Patient needs to be seen because it has been more than 1 year since last office visit. Last office visit was 12-13-16.  Vitamin D Deficiency screening 46  30 - 75 ug/L Final 07/16/2015  4:12 PM 51     Please advise refill.    GEORGE Fuchs

## 2018-03-13 ENCOUNTER — ANTICOAGULATION THERAPY VISIT (OUTPATIENT)
Dept: ANTICOAGULATION | Facility: CLINIC | Age: 43
End: 2018-03-13
Payer: COMMERCIAL

## 2018-03-13 DIAGNOSIS — I63.50 CEREBRAL ARTERY OCCLUSION WITH CEREBRAL INFARCTION (H): ICD-10-CM

## 2018-03-13 DIAGNOSIS — D68.59 PRIMARY HYPERCOAGULABLE STATE (H): ICD-10-CM

## 2018-03-13 DIAGNOSIS — Z79.01 LONG TERM CURRENT USE OF ANTICOAGULANT: ICD-10-CM

## 2018-03-13 LAB — INR POINT OF CARE: 3.6 (ref 0.86–1.14)

## 2018-03-13 PROCEDURE — 36416 COLLJ CAPILLARY BLOOD SPEC: CPT

## 2018-03-13 PROCEDURE — 99207 ZZC NO CHARGE NURSE ONLY: CPT

## 2018-03-13 PROCEDURE — 85610 PROTHROMBIN TIME: CPT | Mod: QW

## 2018-03-13 NOTE — MR AVS SNAPSHOT
Belkisbuck Christopher   3/13/2018 4:45 PM   Anticoagulation Therapy Visit    Description:  42 year old female   Provider:  [facility]   Department:  [facility]           INR as of 3/13/2018     Today's INR 3.6!      Anticoagulation Summary as of 3/13/2018     INR goal 2.0-3.0   Today's INR 3.6!   Full instructions 3/13: 2.5 mg; Otherwise 5 mg every day   Next INR check 4/24/2018    Indications   Long term current use of anticoagulant [Z79.01]  Cerebral artery occlusion with cerebral infarction (H) [I63.50]  Primary hypercoagulable state (H) [D68.59]         Your next Anticoagulation Clinic appointment(s)     Apr 24, 2018  4:45 PM CDT   [facility] with [facility]   [facility]    [address] [address] [phone]              Contact Numbers     Please call [phone] with any problems or questions regarding your therapy.    If you need to cancel and/or reschedule your appointment please call one of the following numbers:  [facility] [phone]  [place] - [phone]  [facility] [phone]  [facility] [phone]  [place] - [phone]            March 2018 Details    Sun Mon Tue Wed Thu Fri Sat         1               2               3                 4               5               6               7               8               9               10                 11               12               13      2.5 mg   See details      14      5 mg         15      5 mg         16      5 mg         17      5 mg           18      5 mg         19      5 mg         20      5 mg         21      5 mg         22      5 mg         23      5 mg         24      5 mg           25      5 mg         26      5 mg         27      5 mg         28      5 mg         29      5 mg         30      5 mg         31      5 mg          Date Details   03/13 This INR check               How to take your warfarin dose     To take:  2.5 mg Take 0.5 of a  5 mg tablet.    To take:  5 mg Take 1 of the 5 mg tablets.           April 2018 Details    Sun Mon Tue Wed Thu Fri Sat     1      5 mg         2      5 mg         3      5 mg         4      5 mg         5      5 mg         6      5 mg         7      5 mg           8      5 mg         9      5 mg         10      5 mg         11      5 mg         12      5 mg         13      5 mg         14      5 mg           15      5 mg         16      5 mg         17      5 mg         18      5 mg         19      5 mg         20      5 mg         21      5 mg           22      5 mg         23      5 mg         24            25               26               27               28                 29               30                     Date Details   No additional details    Date of next INR:  4/24/2018         How to take your warfarin dose     To take:  5 mg Take 1 of the 5 mg tablets.

## 2018-03-13 NOTE — PROGRESS NOTES
ANTICOAGULATION FOLLOW-UP CLINIC VISIT    Patient Name:  Belkis Christopher  Date:  3/13/2018  Contact Type:  Face to Face    SUBJECTIVE:     Patient Findings     Positives Extra doses (took 7.5 mg 3/9, 3/10 and 3/11 to make up for missed doses), Missed doses (3/7, 3/8)    Comments Pt denies changes in medications, diet, activity or health. Pt was out of town for work trip and missed doses.  Writer instructed pt to take one time decreased dose today, then resume maintenance dose.             OBJECTIVE    INR Protime   Date Value Ref Range Status   03/13/2018 3.6 (A) 0.86 - 1.14 Final       ASSESSMENT / PLAN  INR assessment SUPRA    Recheck INR In: 6 WEEKS    INR Location Clinic      Anticoagulation Summary as of 3/13/2018     INR goal 2.0-3.0   Today's INR 3.6!   Maintenance plan 5 mg (5 mg x 1) every day   Full instructions 3/13: 2.5 mg; Otherwise 5 mg every day   Weekly total 35 mg   Plan last modified Fallon Huber, [facility] (6/23/2015)   Next INR check 4/24/2018   Priority INR   Target end date Indefinite    Indications   Long term current use of anticoagulant [Z79.01]  Cerebral artery occlusion with cerebral infarction (H) [I63.50]  Primary hypercoagulable state (H) [D68.59]         Anticoagulation Episode Summary     INR check location     Preferred lab     Send INR reminders to [facility]    Comments * CVA/TIA 2000 (carotid artery), also FVL (hetero). patient frequently misses doses when out of town        Anticoagulation Care Providers     Provider Role Specialty Phone number    Karina Shah Shahrzad,  [facility] Internal Medicine [phone]            See the Encounter Report to view Anticoagulation Flowsheet and Dosing Calendar (Go to Encounters tab in chart review, and find the Anticoagulation Therapy Visit)        Vaishali Justin RN

## 2018-04-24 ENCOUNTER — ANTICOAGULATION THERAPY VISIT (OUTPATIENT)
Dept: ANTICOAGULATION | Facility: CLINIC | Age: 43
End: 2018-04-24
Payer: COMMERCIAL

## 2018-04-24 DIAGNOSIS — I63.50 CEREBRAL ARTERY OCCLUSION WITH CEREBRAL INFARCTION (H): ICD-10-CM

## 2018-04-24 DIAGNOSIS — D68.59 PRIMARY HYPERCOAGULABLE STATE (H): ICD-10-CM

## 2018-04-24 DIAGNOSIS — Z79.01 LONG TERM CURRENT USE OF ANTICOAGULANT: ICD-10-CM

## 2018-04-24 LAB — INR POINT OF CARE: 2.7 (ref 0.86–1.14)

## 2018-04-24 PROCEDURE — 99207 ZZC NO CHARGE NURSE ONLY: CPT

## 2018-04-24 PROCEDURE — 36416 COLLJ CAPILLARY BLOOD SPEC: CPT

## 2018-04-24 PROCEDURE — 85610 PROTHROMBIN TIME: CPT | Mod: QW

## 2018-04-24 RX ORDER — WARFARIN SODIUM 5 MG/1
TABLET ORAL
Qty: 90 TABLET | Refills: 0 | Status: SHIPPED | OUTPATIENT
Start: 2018-04-24 | End: 2018-05-02

## 2018-04-24 NOTE — MR AVS SNAPSHOT
Belkis Christopher   4/24/2018 4:45 PM   Anticoagulation Therapy Visit    Description:  42 year old female   Provider:  [facility]   Department:  [facility]           INR as of 4/24/2018     Today's INR 2.7      Anticoagulation Summary as of 4/24/2018     INR goal 2.0-3.0   Today's INR 2.7   Full instructions 5 mg every day   Next INR check 5/29/2018    Indications   Long term current use of anticoagulant [Z79.01]  Cerebral artery occlusion with cerebral infarction (H) [I63.50]  Primary hypercoagulable state (H) [D68.59]         Your next Anticoagulation Clinic appointment(s)     May 29, 2018  4:45 PM CDT   Anticoagulation Visit with [place] [facility]   [facility]    [address] [address] [phone]              Contact Numbers     Please call [phone] with any problems or questions regarding your therapy.    If you need to cancel and/or reschedule your appointment please call one of the following numbers:  [facility] [phone]  [place] - [phone]  [place] - [phone]  [facility] [phone]  [place] - [phone]            April 2018 Details    Sun Mon Tue Wed Thu Fri Sat     1               2               3               4               5               6               7                 8               9               10               11               12               13               14                 15               16               17               18               19               20               21                 22               23               24      5 mg   See details      25      5 mg         26      5 mg         27      5 mg         28      5 mg           29      5 mg         30      5 mg               Date Details   04/24 This INR check               How to take your warfarin dose     To take:  5 mg Take 1 of the 5 mg tablets.           May 2018 Details    Sun Mon Tue Wed Thu Fri Sat       1      5 mg          2      5 mg         3      5 mg         4      5 mg         5      5 mg           6      5 mg         7      5 mg         8      5 mg         9      5 mg         10      5 mg         11      5 mg         12      5 mg           13      5 mg         14      5 mg         15      5 mg         16      5 mg         17      5 mg         18      5 mg         19      5 mg           20      5 mg         21      5 mg         22      5 mg         23      5 mg         24      5 mg         25      5 mg         26      5 mg           27      5 mg         28      5 mg         29            30               31                  Date Details   No additional details    Date of next INR:  5/29/2018         How to take your warfarin dose     To take:  5 mg Take 1 of the 5 mg tablets.

## 2018-04-24 NOTE — PROGRESS NOTES
ANTICOAGULATION FOLLOW-UP CLINIC VISIT    Patient Name:  Belkis Christopher  Date:  4/24/2018  Contact Type:  Face to Face    SUBJECTIVE:     Patient Findings     Positives Missed doses    Comments States she missed a dose but made it up over the next 2 days following. Patient will continue weekly maintenance dose. INR is therapeutic.              OBJECTIVE    INR Protime   Date Value Ref Range Status   04/24/2018 2.7 (A) 0.86 - 1.14 Final       ASSESSMENT / PLAN  INR assessment THER    Recheck INR In: 5 WEEKS    INR Location Clinic      Anticoagulation Summary as of 4/24/2018     INR goal 2.0-3.0   Today's INR 2.7   Maintenance plan 5 mg (5 mg x 1) every day   Full instructions 5 mg every day   Weekly total 35 mg   No change documented Christian Cochran RN   Plan last modified Fallon Huber [facility] (6/23/2015)   Next INR check 5/29/2018   Priority INR   Target end date Indefinite    Indications   Long term current use of anticoagulant [Z79.01]  Cerebral artery occlusion with cerebral infarction (H) [I63.50]  Primary hypercoagulable state (H) [D68.59]         Anticoagulation Episode Summary     INR check location     Preferred lab     Send INR reminders to [facility]    Comments * CVA/TIA 2000 (carotid artery), also FVL (hetero). patient frequently misses doses when out of town        Anticoagulation Care Providers     Provider Role Specialty Phone number    Karina Shah,  [facility] Internal Medicine [phone]            See the Encounter Report to view Anticoagulation Flowsheet and Dosing Calendar (Go to Encounters tab in chart review, and find the Anticoagulation Therapy Visit)        Christian Cochran RN

## 2018-05-02 ENCOUNTER — OFFICE VISIT (OUTPATIENT)
Dept: FAMILY MEDICINE | Facility: CLINIC | Age: 43
End: 2018-05-02
Payer: COMMERCIAL

## 2018-05-02 VITALS
SYSTOLIC BLOOD PRESSURE: 107 MMHG | HEIGHT: 67 IN | BODY MASS INDEX: 31.55 KG/M2 | TEMPERATURE: 98.6 F | DIASTOLIC BLOOD PRESSURE: 71 MMHG | HEART RATE: 73 BPM | OXYGEN SATURATION: 98 % | WEIGHT: 201 LBS

## 2018-05-02 DIAGNOSIS — E55.9 VITAMIN D DEFICIENCY: ICD-10-CM

## 2018-05-02 DIAGNOSIS — Z79.01 LONG TERM CURRENT USE OF ANTICOAGULANT: ICD-10-CM

## 2018-05-02 DIAGNOSIS — E03.8 SUBCLINICAL HYPOTHYROIDISM: ICD-10-CM

## 2018-05-02 DIAGNOSIS — I63.50 CEREBRAL ARTERY OCCLUSION WITH CEREBRAL INFARCTION (H): ICD-10-CM

## 2018-05-02 DIAGNOSIS — E78.5 HYPERLIPIDEMIA LDL GOAL <70: ICD-10-CM

## 2018-05-02 DIAGNOSIS — Z86.73 HISTORY OF STROKE: ICD-10-CM

## 2018-05-02 DIAGNOSIS — D68.59 PRIMARY HYPERCOAGULABLE STATE (H): Primary | ICD-10-CM

## 2018-05-02 LAB
ANION GAP SERPL CALCULATED.3IONS-SCNC: 6 MMOL/L (ref 3–14)
BUN SERPL-MCNC: 14 MG/DL (ref 7–30)
CALCIUM SERPL-MCNC: 8.6 MG/DL (ref 8.5–10.1)
CHLORIDE SERPL-SCNC: 106 MMOL/L (ref 94–109)
CHOLEST SERPL-MCNC: 201 MG/DL
CO2 SERPL-SCNC: 29 MMOL/L (ref 20–32)
CREAT SERPL-MCNC: 0.79 MG/DL (ref 0.52–1.04)
ERYTHROCYTE [DISTWIDTH] IN BLOOD BY AUTOMATED COUNT: 14.1 % (ref 10–15)
GFR SERPL CREATININE-BSD FRML MDRD: 80 ML/MIN/1.7M2
GLUCOSE SERPL-MCNC: 94 MG/DL (ref 70–99)
HCT VFR BLD AUTO: 40.3 % (ref 35–47)
HDLC SERPL-MCNC: 44 MG/DL
HGB BLD-MCNC: 13.3 G/DL (ref 11.7–15.7)
LDLC SERPL CALC-MCNC: 126 MG/DL
MCH RBC QN AUTO: 30 PG (ref 26.5–33)
MCHC RBC AUTO-ENTMCNC: 33 G/DL (ref 31.5–36.5)
MCV RBC AUTO: 91 FL (ref 78–100)
NONHDLC SERPL-MCNC: 157 MG/DL
PLATELET # BLD AUTO: 321 10E9/L (ref 150–450)
POTASSIUM SERPL-SCNC: 3.7 MMOL/L (ref 3.4–5.3)
RBC # BLD AUTO: 4.44 10E12/L (ref 3.8–5.2)
SODIUM SERPL-SCNC: 141 MMOL/L (ref 133–144)
T4 FREE SERPL-MCNC: 0.9 NG/DL (ref 0.76–1.46)
TRIGL SERPL-MCNC: 155 MG/DL
TSH SERPL DL<=0.005 MIU/L-ACNC: 4.61 MU/L (ref 0.4–4)
WBC # BLD AUTO: 7.4 10E9/L (ref 4–11)

## 2018-05-02 PROCEDURE — 99213 OFFICE O/P EST LOW 20 MIN: CPT | Performed by: INTERNAL MEDICINE

## 2018-05-02 PROCEDURE — 80061 LIPID PANEL: CPT | Performed by: INTERNAL MEDICINE

## 2018-05-02 PROCEDURE — 36415 COLL VENOUS BLD VENIPUNCTURE: CPT | Performed by: INTERNAL MEDICINE

## 2018-05-02 PROCEDURE — 85027 COMPLETE CBC AUTOMATED: CPT | Performed by: INTERNAL MEDICINE

## 2018-05-02 PROCEDURE — 82306 VITAMIN D 25 HYDROXY: CPT | Performed by: INTERNAL MEDICINE

## 2018-05-02 PROCEDURE — 84443 ASSAY THYROID STIM HORMONE: CPT | Performed by: INTERNAL MEDICINE

## 2018-05-02 PROCEDURE — 84439 ASSAY OF FREE THYROXINE: CPT | Performed by: INTERNAL MEDICINE

## 2018-05-02 PROCEDURE — 80048 BASIC METABOLIC PNL TOTAL CA: CPT | Performed by: INTERNAL MEDICINE

## 2018-05-02 RX ORDER — ATORVASTATIN CALCIUM 40 MG/1
40 TABLET, FILM COATED ORAL DAILY
Qty: 90 TABLET | Refills: 3 | Status: SHIPPED | OUTPATIENT
Start: 2018-05-02 | End: 2019-03-05

## 2018-05-02 RX ORDER — WARFARIN SODIUM 5 MG/1
TABLET ORAL
Qty: 90 TABLET | Refills: 0 | Status: SHIPPED | OUTPATIENT
Start: 2018-05-02 | End: 2018-07-03

## 2018-05-02 RX ORDER — ATORVASTATIN CALCIUM 40 MG/1
40 TABLET, FILM COATED ORAL DAILY
Qty: 30 TABLET | Refills: 0 | Status: CANCELLED | OUTPATIENT
Start: 2018-05-02

## 2018-05-02 NOTE — MR AVS SNAPSHOT
After Visit Summary   5/2/2018    Belkis Christopher    MRN: [id number]           Patient Information     Date Of Birth          1975        Visit Information        Provider Department      5/2/2018 3:40 PM Karina Shah, DO [facility]        Today's Diagnoses     Primary hypercoagulable state (H)    -  1    Subclinical hypothyroidism        Hyperlipidemia LDL goal <70        Vitamin D deficiency        Long term current use of anticoagulant        History of stroke          Care Instructions    1.  Blood work today.  2. meds refilled  3. Consider seeing vascular doctor          Follow-ups after your visit        Additional Services     VASCULAR SURGERY REFERRAL       Your provider has referred you to: [facility]: [facility] [place] - Vascular  Services [phone] - Carotid Artery Disease - Symptomatic/TIA & None - Please Order Appropriate Testing   [email or web address]    Please be aware that coverage of these services is subject to the terms and limitations of your health insurance plan.  Call member services at your health plan with any benefit or coverage questions.      Please bring the following with you to your appointment:    (1) Any X-Rays, CTs or MRIs which have been performed.  Contact the facility where they were done to arrange for  prior to your scheduled appointment.    (2) List of current medications   (3) This referral request   (4) Any documents/labs given to you for this referral                  Your next 10 appointments already scheduled     May 29, 2018  4:45 PM CDT   Anticoagulation Visit with [place] [facility]   [facility]    [address] [address] [phone]              Who to contact     If you have questions or need follow up information about today's clinic visit or your schedule please contact [facility] directly at  "[phone].  Normal or non-critical lab and imaging results will be communicated to you by [employer], letter or phone within 4 business days after the clinic has received the results. If you do not hear from us within 7 days, please contact the clinic through [phone] or phone. If you have a critical or abnormal lab result, we will notify you by phone as soon as possible.  Submit refill requests through [employer] or call your pharmacy and they will forward the refill request to us. Please allow 3 business days for your refill to be completed.          Additional Information About Your Visit        [employer] Information     [employer] gives you secure access to your electronic health record. If you see a primary care provider, you can also send messages to your care team and make appointments. If you have questions, please call your primary care clinic.  If you do not have a primary care provider, please call [phone] and they will assist you.        Care EveryWhere ID     This is your Care EveryWhere ID. This could be used by other organizations to access your [place] medical records  [id number]        Your Vitals Were     Pulse Temperature Height Pulse Oximetry Breastfeeding? BMI (Body Mass Index)    73 98.6  F (37  C) (Tympanic) 5' 6.93\" (1.7 m) 98% No 31.55 kg/m2       Blood Pressure from Last 3 Encounters:   05/02/18 107/71   12/13/16 133/84   08/04/16 92/65    Weight from Last 3 Encounters:   05/02/18 201 lb (91.2 kg)   12/13/16 197 lb (89.4 kg)   08/04/16 198 lb 12.8 oz (90.2 kg)              We Performed the Following     Basic metabolic panel     CBC with platelets     Lipid Profile (Chol, Trig, HDL, LDL calc)     TSH with free T4 reflex     VASCULAR SURGERY REFERRAL     Vitamin D Deficiency          Today's Medication Changes          These changes are accurate as of 5/2/18  4:09 PM.  If you have any questions, ask your nurse or doctor.               These medicines have changed or have updated " prescriptions.        Dose/Directions    atorvastatin 40 MG tablet   Commonly known as:  LIPITOR   This may have changed:  additional instructions   Used for:  History of stroke   Changed by:  Karina Shah DO        Dose:  40 mg   Take 1 tablet (40 mg) by mouth daily   Quantity:  90 tablet   Refills:  3       warfarin 5 MG tablet   Commonly known as:  COUMADIN   This may have changed:  additional instructions   Used for:  Long term current use of anticoagulant   Changed by:  Karina Shah DO        As directed by Anticoagulation Clinic (current dose 5mg daily).   Quantity:  90 tablet   Refills:  0            Where to get your medicines      These medications were sent to [facility] [address]  [address] [address]     Phone:  [phone]     atorvastatin 40 MG tablet    cholecalciferol 5000 units Caps    warfarin 5 MG tablet                Primary Care Provider Office Phone # Fax #    Karina Shah [phone] [address] [address]        Equal Access to Services     YAA LINDQUIST : Hadii geri ku hadasho Soomaali, waaxda luqadaha, qaybta kaalmada adeegyada, waxay idiin hayjavon nicolas . So [facility] [phone].    ATENCIÓN: Si habla español, tiene a mckinley disposición servicios gratuitos de asistencia lingüística. [facility] [phone].    We comply with applicable federal civil rights laws and [place] laws. We do not discriminate on the basis of race, color, national origin, age, disability, sex, sexual orientation, or gender identity.            Thank you!     Thank you for choosing [facility]  for your care. Our goal is always to provide you with excellent care. Hearing back from our patients is one way we can continue to improve our services. Please take a few minutes to complete the written survey that you may receive in the mail after your visit with us. Thank you!              Your Updated Medication List - Protect others around you: Learn how to safely use, store and throw away your medicines at www.disposemymeds.org.          This list is accurate as of 5/2/18  4:09 PM.  Always use your most recent med list.                   Brand Name Dispense Instructions for use Diagnosis    ASPIRIN NOT PRESCRIBED    INTENTIONAL    0 each    1 each daily Antiplatelet medication not prescribed intentionally due to Current anticoagulant therapy (warfarin/enoxaparin)    Cerebral artery occlusion with cerebral infarction (H)       atorvastatin 40 MG tablet    LIPITOR    90 tablet    Take 1 tablet (40 mg) by mouth daily    History of stroke       cholecalciferol 5000 units Caps    D 5000    90 capsule    Take 1 capsule (5,000 Units) by mouth daily    Vitamin D deficiency       coenzyme Q-10 200 MG Caps      Take 200 mg by mouth daily        warfarin 5 MG tablet    COUMADIN    90 tablet    As directed by Anticoagulation Clinic (current dose 5mg daily).    Long term current use of anticoagulant

## 2018-05-02 NOTE — LETTER
May 3, 2018      Belkis Christopher  [address] [address] [address]        Dear ,    We are writing to inform you of your test results.  Vitamin D normal, ok to continue with current dose.  TSH similar to previous readings.  Recheck 1-2 years.  Lipids a bit higher than 3 years ago.  Kidney function, blood sugar, electrolytes are normal.  Blood count normal.       Resulted Orders   CBC with platelets   Result Value Ref Range    WBC 7.4 4.0 - 11.0 10e9/L    RBC Count 4.44 3.8 - 5.2 10e12/L    Hemoglobin 13.3 11.7 - 15.7 g/dL    Hematocrit 40.3 35.0 - 47.0 %    MCV 91 78 - 100 fl    MCH 30.0 26.5 - 33.0 pg    MCHC 33.0 31.5 - 36.5 g/dL    RDW 14.1 10.0 - 15.0 %    Platelet Count 321 150 - 450 10e9/L   Vitamin D Deficiency   Result Value Ref Range    Vitamin D Deficiency screening 46 20 - 75 ug/L      Comment:      Season, race, dietary intake, and treatment affect the concentration of   25-hydroxy-Vitamin D. Values may decrease during winter months and increase   during summer months. Values 20-29 ug/L may indicate Vitamin D insufficiency   and values <20 ug/L may indicate Vitamin D deficiency.  Vitamin D determination is routinely performed by an immunoassay specific for   25 hydroxyvitamin D3.  If an individual is on vitamin D2 (ergocalciferol)   supplementation, please specify 25 OH vitamin D2 and D3 level determination by   LCMSMS test VITD23.     Lipid Profile (Chol, Trig, HDL, LDL calc)   Result Value Ref Range    Cholesterol 201 (H) <200 mg/dL      Comment:      Desirable:       <200 mg/dl    Triglycerides 155 (H) <150 mg/dL      Comment:      Borderline high:  150-199 mg/dl  High:             200-499 mg/dl  Very high:       >499 mg/dl  Non Fasting      HDL Cholesterol 44 (L) >49 mg/dL    LDL Cholesterol Calculated 126 (H) <100 mg/dL      Comment:      Above desirable:  100-129 mg/dl  Borderline High:  130-159 mg/dL  High:             160-189 mg/dL  Very high:       >189 mg/dl      Non HDL Cholesterol  157 (H) <130 mg/dL      Comment:      Above Desirable:  130-159 mg/dl  Borderline high:  160-189 mg/dl  High:             190-219 mg/dl  Very high:       >219 mg/dl     Basic metabolic panel   Result Value Ref Range    Sodium 141 133 - 144 mmol/L    Potassium 3.7 3.4 - 5.3 mmol/L    Chloride 106 94 - 109 mmol/L    Carbon Dioxide 29 20 - 32 mmol/L    Anion Gap 6 3 - 14 mmol/L    Glucose 94 70 - 99 mg/dL      Comment:      Non Fasting    Urea Nitrogen 14 7 - 30 mg/dL    Creatinine 0.79 0.52 - 1.04 mg/dL    GFR Estimate 80 >60 mL/min/1.7m2      Comment:      Non  GFR Calc    GFR Estimate If Black >90 >60 mL/min/1.7m2      Comment:       GFR Calc    Calcium 8.6 8.5 - 10.1 mg/dL   TSH with free T4 reflex   Result Value Ref Range    TSH 4.61 (H) 0.40 - 4.00 mU/L   T4 free   Result Value Ref Range    T4 Free 0.90 0.76 - 1.46 ng/dL       If you have any questions or concerns, please call the clinic at the number listed above.     Sincerely,    Karina Shah, DO

## 2018-05-02 NOTE — PROGRESS NOTES
"  SUBJECTIVE:   Belkis Christopher is a 42 year old female who presents to clinic today for the following health issues:  Chief Complaint   Patient presents with     Cerebral artery occlusion with cerebral infarction (H) I63.     Follow up.       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  I had recommend follow-up with endovascular and/or neurology/neurosurgery to discuss if this needs surgical attention.  Patient did not follow-up with referral because of busy life.    Current Outpatient Prescriptions   Medication Sig Dispense Refill     atorvastatin (LIPITOR) 40 MG tablet Take 1 tablet (40 mg) by mouth daily (Needs follow-up appointment for this medication) 30 tablet 0     cholecalciferol (D 5000) 5000 UNITS CAPS Take 1 capsule (5,000 Units) by mouth daily 90 capsule 0     coenzyme Q-10 200 MG CAPS Take 200 mg by mouth daily       warfarin (COUMADIN) 5 MG tablet As directed by Anticoagulation Clinic (current dose 5mg daily). Please see pharm notes below. 90 tablet 0     ASPIRIN NOT PRESCRIBED, INTENTIONAL, 1 each daily Antiplatelet medication not prescribed intentionally due to Current anticoagulant therapy (warfarin/enoxaparin) 0 each 0       Reviewed and updated as needed this visit by clinical staff  Tobacco  Allergies  Meds  Problems  Med Hx  Surg Hx  Fam Hx  Soc Hx        Reviewed and updated as needed this visit by Provider  Allergies  Meds  Problems         ROS:  Constitutional, HEENT, cardiovascular, pulmonary, gi and gu systems are negative, except as otherwise noted.    OBJECTIVE:     /71 (BP Location: Right arm, Patient Position: Chair, Cuff Size: Adult Large)  Pulse 73  Temp 98.6  F (37  C) (Tympanic)  Ht 5' 6.93\" (1.7 m)  Wt 201 lb (91.2 kg)  SpO2 98%  Breastfeeding? No  BMI 31.55 kg/m2  Body mass index is 31.55 kg/(m^2).  GENERAL APPEARANCE: healthy, alert and no distress  RESP: lungs clear to auscultation - no rales, " rhonchi or wheezes  CV: regular rates and rhythm, normal S1 S2, no S3 or S4 and no murmur, click or rub    Diagnostic Test Results:  none     ASSESSMENT/PLAN:       ICD-10-CM    1. Primary hypercoagulable state (H) D68.59 VASCULAR SURGERY REFERRAL   2. Subclinical hypothyroidism E03.9 TSH with free T4 reflex   3. Hyperlipidemia LDL goal <70 E78.5    4. Vitamin D deficiency E55.9 cholecalciferol (D 5000) 5000 units CAPS     Vitamin D Deficiency   5. Long term current use of anticoagulant Z79.01 warfarin (COUMADIN) 5 MG tablet     CBC with platelets   6. History of stroke Z86.73 atorvastatin (LIPITOR) 40 MG tablet     CBC with platelets     Lipid Profile (Chol, Trig, HDL, LDL calc)     Basic metabolic panel     VASCULAR SURGERY REFERRAL     Ok if patient decides not to follow-up with vascular provider    Patient Instructions   1.  Blood work today.  2. meds refilled  3. Consider seeing vascular doctor      Karina Shah, [facility]

## 2018-05-03 ENCOUNTER — TELEPHONE (OUTPATIENT)
Dept: OTHER | Facility: CLINIC | Age: 43
End: 2018-05-03

## 2018-05-03 LAB — DEPRECATED CALCIDIOL+CALCIFEROL SERPL-MC: 46 UG/L (ref 20–75)

## 2018-05-03 NOTE — TELEPHONE ENCOUNTER
Pt referred to [facility] by Dr Shah for hypercoaguable state, Hx of stroke    Pertinent history/ imaging includes: hypercoaguable state, Hx of stroke, hyperlipidemia, Subclinical hypothyroidism, vitamin D deficiency    Pt needs to be schedueld for consult with vascular medicine.  Will route to scheduling to coordinate and appointment next available (Patient lives in [place])    Hema Zaragoza RN, BSN

## 2018-05-03 NOTE — TELEPHONE ENCOUNTER
Called and left a message for the patient to call us back to schedule a consult appointment with vascular medicine. Per Hema it is okay to schedule her with Dr. Meier in [place] if she would like.

## 2018-05-09 NOTE — TELEPHONE ENCOUNTER
Belkis is scheduled with Dr Meier in [place] on 5/17/18. Isela Chavis -  at [facility]

## 2018-05-17 ENCOUNTER — OFFICE VISIT (OUTPATIENT)
Dept: VASCULAR SURGERY | Facility: CLINIC | Age: 43
End: 2018-05-17
Attending: INTERNAL MEDICINE
Payer: COMMERCIAL

## 2018-05-17 VITALS
HEART RATE: 73 BPM | WEIGHT: 201 LBS | TEMPERATURE: 99.2 F | BODY MASS INDEX: 32.3 KG/M2 | SYSTOLIC BLOOD PRESSURE: 115 MMHG | DIASTOLIC BLOOD PRESSURE: 74 MMHG | HEIGHT: 66 IN

## 2018-05-17 DIAGNOSIS — D68.59 PRIMARY HYPERCOAGULABLE STATE (H): ICD-10-CM

## 2018-05-17 DIAGNOSIS — I77.71 CAROTID ARTERY DISSECTION (H): ICD-10-CM

## 2018-05-17 DIAGNOSIS — Z79.01 LONG TERM CURRENT USE OF ANTICOAGULANT: Primary | ICD-10-CM

## 2018-05-17 LAB
BASOPHILS # BLD AUTO: 0 10E9/L (ref 0–0.2)
BASOPHILS NFR BLD AUTO: 0.4 %
CRP SERPL-MCNC: 12.5 MG/L (ref 0–8)
D DIMER PPP FEU-MCNC: 0.3 UG/ML FEU (ref 0–0.5)
DIFFERENTIAL METHOD BLD: NORMAL
EOSINOPHIL # BLD AUTO: 0.1 10E9/L (ref 0–0.7)
EOSINOPHIL NFR BLD AUTO: 1.5 %
ERYTHROCYTE [DISTWIDTH] IN BLOOD BY AUTOMATED COUNT: 14.1 % (ref 10–15)
ERYTHROCYTE [SEDIMENTATION RATE] IN BLOOD BY WESTERGREN METHOD: 27 MM/H (ref 0–20)
HCT VFR BLD AUTO: 38.9 % (ref 35–47)
HGB BLD-MCNC: 12.9 G/DL (ref 11.7–15.7)
LYMPHOCYTES # BLD AUTO: 2.4 10E9/L (ref 0.8–5.3)
LYMPHOCYTES NFR BLD AUTO: 28.1 %
MCH RBC QN AUTO: 29.9 PG (ref 26.5–33)
MCHC RBC AUTO-ENTMCNC: 33.2 G/DL (ref 31.5–36.5)
MCV RBC AUTO: 90 FL (ref 78–100)
MONOCYTES # BLD AUTO: 0.6 10E9/L (ref 0–1.3)
MONOCYTES NFR BLD AUTO: 6.8 %
NEUTROPHILS # BLD AUTO: 5.4 10E9/L (ref 1.6–8.3)
NEUTROPHILS NFR BLD AUTO: 63.2 %
PLATELET # BLD AUTO: 274 10E9/L (ref 150–450)
RBC # BLD AUTO: 4.32 10E12/L (ref 3.8–5.2)
WBC # BLD AUTO: 8.5 10E9/L (ref 4–11)

## 2018-05-17 PROCEDURE — 85613 RUSSELL VIPER VENOM DILUTED: CPT | Performed by: INTERNAL MEDICINE

## 2018-05-17 PROCEDURE — 85597 PHOSPHOLIPID PLTLT NEUTRALIZ: CPT | Performed by: INTERNAL MEDICINE

## 2018-05-17 PROCEDURE — 36415 COLL VENOUS BLD VENIPUNCTURE: CPT | Performed by: INTERNAL MEDICINE

## 2018-05-17 PROCEDURE — 85730 THROMBOPLASTIN TIME PARTIAL: CPT | Performed by: INTERNAL MEDICINE

## 2018-05-17 PROCEDURE — 86140 C-REACTIVE PROTEIN: CPT | Performed by: INTERNAL MEDICINE

## 2018-05-17 PROCEDURE — 86147 CARDIOLIPIN ANTIBODY EA IG: CPT | Performed by: INTERNAL MEDICINE

## 2018-05-17 PROCEDURE — 85300 ANTITHROMBIN III ACTIVITY: CPT | Performed by: INTERNAL MEDICINE

## 2018-05-17 PROCEDURE — 85613 RUSSELL VIPER VENOM DILUTED: CPT | Mod: 59 | Performed by: INTERNAL MEDICINE

## 2018-05-17 PROCEDURE — 00000401 ZZHCL STATISTIC THROMBIN TIME NC: Performed by: INTERNAL MEDICINE

## 2018-05-17 PROCEDURE — 00000167 ZZHCL STATISTIC INR NC: Performed by: INTERNAL MEDICINE

## 2018-05-17 PROCEDURE — 85379 FIBRIN DEGRADATION QUANT: CPT | Performed by: INTERNAL MEDICINE

## 2018-05-17 PROCEDURE — 85652 RBC SED RATE AUTOMATED: CPT | Performed by: INTERNAL MEDICINE

## 2018-05-17 PROCEDURE — 99204 OFFICE O/P NEW MOD 45 MIN: CPT | Performed by: INTERNAL MEDICINE

## 2018-05-17 PROCEDURE — 86146 BETA-2 GLYCOPROTEIN ANTIBODY: CPT | Performed by: INTERNAL MEDICINE

## 2018-05-17 PROCEDURE — 85025 COMPLETE CBC W/AUTO DIFF WBC: CPT | Performed by: INTERNAL MEDICINE

## 2018-05-17 NOTE — NURSING NOTE
"Initial /74 (BP Location: Left arm, Patient Position: Sitting, Cuff Size: Adult Large)  Pulse 73  Temp 99.2  F (37.3  C) (Tympanic)  Ht 1.676 m (5' 6\")  Wt 91.2 kg (201 lb)  BMI 32.44 kg/m2 Estimated body mass index is 32.44 kg/(m^2) as calculated from the following:    Height as of this encounter: 1.676 m (5' 6\").    Weight as of this encounter: 91.2 kg (201 lb). .    Carmelita Barkley MA    "

## 2018-05-17 NOTE — MR AVS SNAPSHOT
After Visit Summary   5/17/2018    Belkis Christopher    MRN: [id number]           Patient Information     Date Of Birth          1975        Visit Information        Provider Department      5/17/2018 2:00 PM Barron Meier MD [facility]        Today's Diagnoses     Long term current use of anticoagulant    -  1    Primary hypercoagulable state (H)        Carotid artery dissection (H)           Follow-ups after your visit        Follow-up notes from your care team     Return in about 4 weeks (around 6/14/2018).      Your next 10 appointments already scheduled     May 29, 2018  4:45 PM CDT   Anticoagulation Visit with [place] [facility]   [facility] ([facility]    [address] [facility] [id number] [phone]              Future tests that were ordered for you today     Open Future Orders        Priority Expected Expires Ordered    Homocysteine Routine  5/17/2019 5/17/2018    Lupus Anticoagulant Panel Routine 5/17/2018 8/17/2018 5/17/2018            Who to contact     If you have questions or need follow up information about today's clinic visit or your schedule please contact [facility] directly at [phone].  Normal or non-critical lab and imaging results will be communicated to you by MyChart, letter or phone within 4 business days after the clinic has received the results. If you do not hear from us within 7 days, please contact the clinic through [facility] or phone. If you have a critical or abnormal lab result, we will notify you by phone as soon as possible.  Submit refill requests through [employer] [facility] or call your pharmacy and they will forward the refill request to us. Please allow 3 business days for your refill to be completed.          Additional Information About Your Visit        MyChart Information     [employer] gives you secure access to your electronic health record. If you see a primary care provider, you can also  "send messages to your care team and make appointments. If you have questions, please call your primary care clinic.  If you do not have a primary care provider, please call [phone] and they will assist you.        Care EveryWhere ID     This is your Care EveryWhere ID. This could be used by other organizations to access your [place] medical records  [id number]        Your Vitals Were     Pulse Temperature Height BMI (Body Mass Index)          73 99.2  F (37.3  C) (Tympanic) 1.676 m (5' 6\") 32.44 kg/m2         Blood Pressure from Last 3 Encounters:   05/17/18 115/74   05/02/18 107/71   12/13/16 133/84    Weight from Last 3 Encounters:   05/17/18 91.2 kg (201 lb)   05/02/18 91.2 kg (201 lb)   12/13/16 89.4 kg (197 lb)              We Performed the Following     Antithrombin III     Beta 2 Glycoprotein Antibodies IGG IGM     Cardiolipin Leatha IgG and IgM     CBC with platelets differential     CRP inflammation     D dimer quantitative     Erythrocyte sedimentation rate auto     Lupus Anticoagulant Panel        Primary Care Provider Office Phone # Fax #    Karina Shah,  [phone] [address] [facility] [facility] [address]        Equal Access to Services     YAA LINDQUIST : Hadii aad ku hadasho Soomaali, waaxda luqadaha, qaybta kaalmada adeegyada, waxay idiin hayanthonyn lan martins laadrianna [place]. So [facility] [phone].    ATENCIÓN: Si habla español, tiene a mckinley disposición servicios gratuitos de asistencia lingüística. Llame al [phone].    We comply with applicable federal civil rights laws and [place] laws. We do not discriminate on the basis of race, color, national origin, age, disability, sex, sexual orientation, or gender identity.            Thank you!     Thank you for choosing [facility]  for your care. Our goal is always to provide you with excellent care. Hearing back from our patients is one way we can continue to improve our services. Please take a few minutes to " complete the written survey that you may receive in the mail after your visit with us. Thank you!             Your Updated Medication List - Protect others around you: Learn how to safely use, store and throw away your medicines at www.disposemymeds.org.          This list is accurate as of 5/17/18  2:52 PM.  Always use your most recent med list.                   Brand Name Dispense Instructions for use Diagnosis    ASPIRIN NOT PRESCRIBED    INTENTIONAL    0 each    1 each daily Antiplatelet medication not prescribed intentionally due to Current anticoagulant therapy (warfarin/enoxaparin)    Cerebral artery occlusion with cerebral infarction (H)       atorvastatin 40 MG tablet    LIPITOR    90 tablet    Take 1 tablet (40 mg) by mouth daily    History of stroke       cholecalciferol 5000 units Caps    D 5000    90 capsule    Take 1 capsule (5,000 Units) by mouth daily    Vitamin D deficiency       coenzyme Q-10 200 MG Caps      Take 200 mg by mouth daily        warfarin 5 MG tablet    COUMADIN    90 tablet    As directed by Anticoagulation Clinic (current dose 5mg daily).    Long term current use of anticoagulant

## 2018-05-17 NOTE — PROGRESS NOTES
Vascular Medicine Consultation     Chief Complaint   Hypercoagulable state    Date of Admission:  (Not on file)    Belkis Christopher is a 42 year old female who was admitted on (Not on file). I was asked to see the patient for hypercoagulable state.    Code Status    Full code    Reason for Consult   Reason for consult: I was asked by PCP to evaluate this patient for hypercoagulable state.    Primary Care Physician   Karina Shah      History is obtained from the patient    History of Present Illness   Belkis Christopher is a 42 year old female who presents with hypercoagulable state, patient is 42 years old female who was diagnosed with a CVA back in 2000 she did have left middle cerebral artery embolus which resulted in the CVA in addition a possible left carotid artery dissection that led to the CVA  While taking the history from the patient she mentioned that back then she was on oral contraceptive pills, and she did fell from a tractor prior to the stroke by 1 day and sustained a possible neck injury  So patient was taken to the hospital she was diagnosed with the dissection she was started on anticoagulation and since then she was or she has been on anticoagulation for the past 18 years through all this.  She has been therapeutic and her INR was between 2 and 3  Patient was also diagnosed with factor V Leiden heterozygous in addition to something else that she cannot even remember but she thinks it is anticardiolipin but this was only positive once  Patient was not able to get pregnant because she has been on Coumadin for the past 18 years and she was advised against getting pregnant because of her condition  Patient is here today for hypercoagulable state yet patient has no complaints whatsoever patient knows that she cannot be tested for protein C and protein S deficiency while she is on Coumadin  Patient reported no numbness, weakness of any side of her body, no focal lesions, no blurring of vision, no  difficulty with speech or swallowing, no hypertension, no abdominal pain, no history of bruising, no history of bleeding, and no history of aneurysms or blood clots whatsoever    Past Medical History   I have reviewed this patient's medical history and updated it with pertinent information if needed.   History reviewed. No pertinent past medical history.    Past Surgical History   I have reviewed this patient's surgical history and updated it with pertinent information if needed.  Past Surgical History:   Procedure Laterality Date     BREAST SURGERY         Prior to Admission Medications   Cannot display prior to admission medications because the patient has not been admitted in this contact.     Allergies   No Known Allergies    Social History   I have reviewed this patient's social history and updated it with pertinent information if needed. Belkis Christopher  reports that she has never smoked. She has never used smokeless tobacco. She reports that she drinks alcohol. She reports that she does not use illicit drugs.    Family History   I have reviewed this patient's family history and updated it with pertinent information if needed.   Family History   Problem Relation Age of Onset     Hypertension Father        Review of Systems   The 10 point Review of Systems is negative other than noted in the HPI or here.     Physical Exam   Temp: 99.2  F (37.3  C) Temp src: Tympanic BP: 115/74 Pulse: 73            Vital Signs with Ranges  Temp:  [99.2  F (37.3  C)] 99.2  F (37.3  C)  Pulse:  [73-84] 73  BP: (111-115)/(74-76) 115/74  201 lbs 0 oz    Constitutional: awake, alert, cooperative, no apparent distress, and appears stated age  Eyes: Lids and lashes normal, pupils equal, round and reactive to light, extra ocular muscles intact, sclera clear, conjunctiva normal  ENT: normocepalic, without obvious abnormality, oropharynx pink and moist  Hematologic / Lymphatic: no lymphadenopathy  Respiratory: No increased work of  breathing, good air exchange, clear to auscultation bilaterally, no crackles or wheezing  Cardiovascular: regular rate and rhythm, normal S1 and S2 and no murmur noted  GI: Normal bowel sounds, soft, non-distended, non-tender  Skin: no redness, warmth, or swelling, no rashes and no lesions  Musculoskeletal: There is no redness, warmth, or swelling of the joints.  Full range of motion noted.  Motor strength is 5 out of 5 all extremities bilaterally.  Tone is normal.  Neurologic: Awake, alert, oriented to name, place and time.  Cranial nerves II-XII are grossly intact.  Motor is 5 out of 5 bilaterally.    Neuropsychiatric:  Normal affect, memory, insight.  Pulses: Palpable pulses, +2 bilateral and equal upper extremities equal to lower extremities  . No carotid bruits appreciated.     Data   Most Recent 3 CBC's:  Recent Labs   Lab Test  05/02/18   1621   WBC  7.4   HGB  13.3   MCV  91   PLT  321     Most Recent 3 BMP's:  Recent Labs   Lab Test  05/02/18   1621   NA  141   POTASSIUM  3.7   CHLORIDE  106   CO2  29   BUN  14   CR  0.79   ANIONGAP  6   MARY  8.6   GLC  94     Most Recent 2 LFT's:No lab results found.  Most Recent 3 INR's:  Recent Labs   Lab Test 04/24/18 03/13/18 01/23/18   INR  2.7*  3.6*  2.7*     Most Recent Cholesterol Panel:  Recent Labs   Lab Test  05/02/18   1621   CHOL  201*   LDL  126*   HDL  44*   TRIG  155*     Most Recent TSH and T4:  Recent Labs   Lab Test  05/02/18   1621   TSH  4.61*   T4  0.90     Most Recent 6 glucoses:  Recent Labs   Lab Test  05/02/18   1621   GLC  94     Most Recent Urinalysis:  Recent Labs   Lab Test  08/04/16   1601   COLOR  Yellow   APPEARANCE  Clear   URINEGLC  Negative   URINEBILI  Negative   URINEKETONE  Negative   SG  1.020   UBLD  Negative   URINEPH  5.5   PROTEIN  Negative   UROBILINOGEN  0.2   NITRITE  Negative   LEUKEST  Negative     Most Recent ESR & CRP:No lab results found.  Most Recent Anemia Panel:  Recent Labs   Lab Test  05/02/18   1621   WBC  7.4    HGB  13.3   HCT  40.3   MCV  91   PLT  321         Assessment & Plan   (Z79.01) Long term current use of anticoagulant  (primary encounter diagnosis)  Comment: Continue with Coumadin for now  Plan: Antithrombin III, Beta 2 Glycoprotein         Antibodies IGG IGM, Cardiolipin Leatha IgG and         IgM, CBC with platelets differential, CRP         inflammation, D dimer quantitative, Erythrocyte        sedimentation rate auto, Lupus Anticoagulant         Panel, Lupus Anticoagulant Panel            (D68.59) Primary hypercoagulable state (H)  Comment: Continue with Coumadin for now and will be checking the labs mentioned below apart from protein C and protein S as the patient is on Coumadin  Plan: Antithrombin III, Beta 2 Glycoprotein         Antibodies IGG IGM, Cardiolipin Leatha IgG and         IgM, CBC with platelets differential, CRP         inflammation, D dimer quantitative, Erythrocyte        sedimentation rate auto, Lupus Anticoagulant         Panel, Lupus Anticoagulant Panel, Homocysteine            (I77.71) Carotid artery dissection (H)  Comment: Doppler carotid ultrasound bilaterally  Plan: Antithrombin III, Beta 2 Glycoprotein         Antibodies IGG IGM, Cardiolipin Leatha IgG and         IgM, CBC with platelets differential, CRP         inflammation, D dimer quantitative, Erythrocyte        sedimentation rate auto, Lupus Anticoagulant         Panel, Lupus Anticoagulant Panel              Summary: We will see the patient once test results are available, patient is to continue with Coumadin for now, will await hypercoagulable results, we will be testing protein C and protein S deficiency for now as the patient is on Coumadin, if antiphospholipid panel comes back as positive then she should be tested in 12 weeks from now again    Barron Meier MD

## 2018-05-18 LAB
AT III ACT/NOR PPP CHRO: 110 % (ref 85–135)
CARDIOLIPIN ANTIBODY IGG: >112 GPL-U/ML (ref 0–19.9)
CARDIOLIPIN ANTIBODY IGM: 2 MPL-U/ML (ref 0–19.9)

## 2018-05-21 LAB — LA PPP-IMP: POSITIVE

## 2018-05-24 LAB
B2 GLYCOPROT1 IGG SERPL IA-ACNC: 14 U/ML
B2 GLYCOPROT1 IGM SERPL IA-ACNC: <0.9 U/ML

## 2018-05-29 ENCOUNTER — ANTICOAGULATION THERAPY VISIT (OUTPATIENT)
Dept: ANTICOAGULATION | Facility: CLINIC | Age: 43
End: 2018-05-29
Payer: COMMERCIAL

## 2018-05-29 DIAGNOSIS — Z79.01 LONG TERM CURRENT USE OF ANTICOAGULANT: ICD-10-CM

## 2018-05-29 DIAGNOSIS — D68.59 PRIMARY HYPERCOAGULABLE STATE (H): ICD-10-CM

## 2018-05-29 LAB — INR POINT OF CARE: 2 (ref 0.86–1.14)

## 2018-05-29 PROCEDURE — 99207 ZZC NO CHARGE NURSE ONLY: CPT

## 2018-05-29 PROCEDURE — 85610 PROTHROMBIN TIME: CPT | Mod: QW

## 2018-05-29 PROCEDURE — 36416 COLLJ CAPILLARY BLOOD SPEC: CPT

## 2018-05-29 NOTE — MR AVS SNAPSHOT
Belkis Ashwin   5/29/2018 4:45 PM   Anticoagulation Therapy Visit    Description:  42 year old female   Provider:  [place] [facility]   Department:  [place] [facility]           INR as of 5/29/2018     Today's INR 2.0      Anticoagulation Summary as of 5/29/2018     INR goal 2.0-3.0   Today's INR 2.0   Full warfarin instructions 5 mg every day   Next INR check 7/10/2018    Indications   Long term current use of anticoagulant [Z79.01]  Cerebral artery occlusion with cerebral infarction (H) (Resolved) [I63.50]  Primary hypercoagulable state (H) [D68.59]         Your next Anticoagulation Clinic appointment(s)     Jul 03, 2018  3:30 PM CDT   Anticoagulation Visit with [place] [facility]   [facility]    [address] [address] [phone]              Contact Numbers     Please call [phone] with any problems or questions regarding your therapy.    If you need to cancel and/or reschedule your appointment please call one of the following numbers:  [facility] [phone]  [facility] [phone]  [place] - [phone]  [facility] [phone]  [place] - [phone]            May 2018 Details    Sun Mon Tue Wed Thu Fri Sat       1               2               3               4               5                 6               7               8               9               10               11               12                 13               14               15               16               17               18               19                 20               21               22               23               24               25               26                 27               28               29      5 mg   See details      30      5 mg         31      5 mg            Date Details   05/29 This INR check               How to take your warfarin dose     To take:  5 mg Take 1 of the 5 mg tablets.           June 2018 Details    Sun Mon Tue Wed Thu Fri Sat           1      5 mg         2      5 mg           3      5 mg         4      5 mg         5      5 mg         6      5 mg         7      5 mg         8      5 mg         9      5 mg           10      5 mg         11      5 mg         12      5 mg         13      5 mg         14      5 mg         15      5 mg         16      5 mg           17      5 mg         18      5 mg         19      5 mg         20      5 mg         21      5 mg         22      5 mg         23      5 mg           24      5 mg         25      5 mg         26      5 mg         27      5 mg         28      5 mg         29      5 mg         30      5 mg          Date Details   No additional details            How to take your warfarin dose     To take:  5 mg Take 1 of the 5 mg tablets.           July 2018 Details    Sun Mon Tue Wed Thu Fri Sat     1      5 mg         2      5 mg         3      5 mg         4      5 mg         5      5 mg         6      5 mg         7      5 mg           8      5 mg         9      5 mg         10            11               12               13               14                 15               16               17               18               19               20               21                 22               23               24               25               26               27               28                 29               30               31                    Date Details   No additional details    Date of next INR:  7/10/2018         How to take your warfarin dose     To take:  5 mg Take 1 of the 5 mg tablets.

## 2018-05-29 NOTE — PROGRESS NOTES
ANTICOAGULATION FOLLOW-UP CLINIC VISIT    Patient Name:  Belkis Christopher  Date:  5/29/2018  Contact Type:  Face to Face    SUBJECTIVE:     Patient Findings     Positives No Problem Findings    Comments Patient denies any changes. Patient will continue weekly maintenance dose. INR is therapeutic.              OBJECTIVE    INR Protime   Date Value Ref Range Status   05/29/2018 2.0 (A) 0.86 - 1.14 Final       ASSESSMENT / PLAN  INR assessment THER    Recheck INR In: 5 WEEKS    INR Location Clinic      Anticoagulation Summary as of 5/29/2018     INR goal 2.0-3.0   Today's INR 2.0   Warfarin maintenance plan 5 mg (5 mg x 1) every day   Full warfarin instructions 5 mg every day   Weekly warfarin total 35 mg   No change documented Christian Cochran, GEORGE   Plan last modified Fallon Huber [facility] (6/23/2015)   Next INR check 7/10/2018   Priority INR   Target end date Indefinite    Indications   Long term current use of anticoagulant [Z79.01]  Cerebral artery occlusion with cerebral infarction (H) (Resolved) [I63.50]  Primary hypercoagulable state (H) [D68.59]         Anticoagulation Episode Summary     INR check location     Preferred lab     Send INR reminders to [facility]    Comments * CVA/TIA 2000 (carotid artery), also FVL (hetero). patient frequently misses doses when out of town        Anticoagulation Care Providers     Provider Role Specialty Phone number    ShahKarina dhillon,  [facility] [phone]            See the Encounter Report to view Anticoagulation Flowsheet and Dosing Calendar (Go to Encounters tab in chart review, and find the Anticoagulation Therapy Visit)        Christian Cochran, RN

## 2018-07-03 ENCOUNTER — ANTICOAGULATION THERAPY VISIT (OUTPATIENT)
Dept: ANTICOAGULATION | Facility: CLINIC | Age: 43
End: 2018-07-03
Payer: COMMERCIAL

## 2018-07-03 DIAGNOSIS — Z79.01 LONG TERM CURRENT USE OF ANTICOAGULANT: ICD-10-CM

## 2018-07-03 DIAGNOSIS — D68.59 PRIMARY HYPERCOAGULABLE STATE (H): ICD-10-CM

## 2018-07-03 LAB — INR POINT OF CARE: 3.1 (ref 0.86–1.14)

## 2018-07-03 PROCEDURE — 85610 PROTHROMBIN TIME: CPT | Mod: QW

## 2018-07-03 PROCEDURE — 36416 COLLJ CAPILLARY BLOOD SPEC: CPT

## 2018-07-03 PROCEDURE — 99207 ZZC NO CHARGE NURSE ONLY: CPT

## 2018-07-03 RX ORDER — WARFARIN SODIUM 5 MG/1
TABLET ORAL
Qty: 90 TABLET | Refills: 0 | Status: SHIPPED | OUTPATIENT
Start: 2018-07-03 | End: 2018-09-28

## 2018-07-03 NOTE — MR AVS SNAPSHOT
Belkisbuck Christopher   7/3/2018 3:30 PM   Anticoagulation Therapy Visit    Description:  42 year old female   Provider:  [place] [facility]   Department:  [facility]           INR as of 7/3/2018     Today's INR 3.1!      Anticoagulation Summary as of 7/3/2018     INR goal 2.0-3.0   Today's INR 3.1!   Full warfarin instructions 5 mg every day   Next INR check 8/14/2018    Indications   Long term current use of anticoagulant [Z79.01]  Cerebral artery occlusion with cerebral infarction (H) (Resolved) [I63.50]  Primary hypercoagulable state (H) [D68.59]         Your next Anticoagulation Clinic appointment(s)     Jul 03, 2018  3:30 PM CDT   [facility]agulation Visit with [place] [facility]   [facility]    [address] [address]   [phone]            Aug 14, 2018  4:00 PM CDT   Anticoagulation Visit with [place] [facility]   [facility]    [address] [address]   [phone]              Contact Numbers     Please call [phone] with any problems or questions regarding your therapy.    If you need to cancel and/or reschedule your appointment please call one of the following numbers:  [facility] - [phone]  [place] - [phone]  [place] - [phone]  [place] - [phone]  [place] - [phone]            July 2018 Details    Sun Mon Tue Wed Thu Fri Sat     1               2               3      5 mg   See details      4      5 mg         5      5 mg         6      5 mg         7      5 mg           8      5 mg         9      5 mg         10      5 mg         11      5 mg         12      5 mg         13      5 mg         14      5 mg           15      5 mg         16      5 mg         17      5 mg         18      5 mg         19      5 mg         20      5 mg         21      5 mg           22      5 mg         23      5 mg         24      5 mg         25      5 mg         26      5 mg          27      5 mg         28      5 mg           29      5 mg         30      5 mg         31      5 mg              Date Details   07/03 This INR check               How to take your warfarin dose     To take:  5 mg Take 1 of the 5 mg tablets.           August 2018 Details    Sun Mon Tue Wed Thu Fri Sat        1      5 mg         2      5 mg         3      5 mg         4      5 mg           5      5 mg         6      5 mg         7      5 mg         8      5 mg         9      5 mg         10      5 mg         11      5 mg           12      5 mg         13      5 mg         14            15               16               17               18                 19               20               21               22               23               24               25                 26               27               28               29               30               31                 Date Details   No additional details    Date of next INR:  8/14/2018         How to take your warfarin dose     To take:  5 mg Take 1 of the 5 mg tablets.

## 2018-07-03 NOTE — PROGRESS NOTES
ANTICOAGULATION FOLLOW-UP CLINIC VISIT    Patient Name:  Belkis Christopher  Date:  7/3/2018  Contact Type:  Face to Face    SUBJECTIVE:     Patient Findings     Positives No Problem Findings    Comments Patient denies any changes. Patient will continue weekly maintenance dose. INR is therapeutic.              OBJECTIVE    INR Protime   Date Value Ref Range Status   07/03/2018 3.1 (A) 0.86 - 1.14 Final       ASSESSMENT / PLAN  INR assessment THER +/-1   Recheck INR In: 6 WEEKS    INR Location Clinic      Anticoagulation Summary as of 7/3/2018     INR goal 2.0-3.0   Today's INR 3.1!   Warfarin maintenance plan 5 mg (5 mg x 1) every day   Full warfarin instructions 5 mg every day   Weekly warfarin total 35 mg   No change documented Christian Cochran RN   Plan last modified Fallon Huber [facility] (6/23/2015)   Next INR check 8/14/2018   Priority INR   Target end date Indefinite    Indications   Long term current use of anticoagulant [Z79.01]  Cerebral artery occlusion with cerebral infarction (H) (Resolved) [I63.50]  Primary hypercoagulable state (H) [D68.59]         Anticoagulation Episode Summary     INR check location     Preferred lab     Send INR reminders to [facility]    Comments * CVA/TIA 2000 (carotid artery), also FVL (hetero). patient frequently misses doses when out of town        Anticoagulation Care Providers     Provider Role Specialty Phone number    Pablo Karina Sara,  [facility] [phone]            See the Encounter Report to view Anticoagulation Flowsheet and Dosing Calendar (Go to Encounters tab in chart review, and find the Anticoagulation Therapy Visit)        Christian Cochran RN

## 2018-09-04 ENCOUNTER — ANTICOAGULATION THERAPY VISIT (OUTPATIENT)
Dept: ANTICOAGULATION | Facility: CLINIC | Age: 43
End: 2018-09-04
Payer: COMMERCIAL

## 2018-09-04 DIAGNOSIS — D68.59 PRIMARY HYPERCOAGULABLE STATE (H): ICD-10-CM

## 2018-09-04 DIAGNOSIS — Z79.01 LONG TERM CURRENT USE OF ANTICOAGULANT: ICD-10-CM

## 2018-09-04 LAB — INR POINT OF CARE: 4.8 (ref 0.86–1.14)

## 2018-09-04 PROCEDURE — 85610 PROTHROMBIN TIME: CPT | Mod: QW

## 2018-09-04 PROCEDURE — 36416 COLLJ CAPILLARY BLOOD SPEC: CPT

## 2018-09-04 PROCEDURE — 99207 ZZC NO CHARGE NURSE ONLY: CPT

## 2018-09-04 NOTE — MR AVS SNAPSHOT
Belkis Christopher   9/4/2018 3:45 PM   Anticoagulation Therapy Visit    Description:  42 year old female   Provider:  [place] [facility]   Department:  [facility]           INR as of 9/4/2018     Today's INR 4.8!      Anticoagulation Summary as of 9/4/2018     INR goal 2.0-3.0   Today's INR 4.8!   Full warfarin instructions 9/4: Hold; 9/5: 2.5 mg; Otherwise 5 mg every day   Next INR check 9/18/2018    Indications   Long term current use of anticoagulant [Z79.01]  Cerebral artery occlusion with cerebral infarction (H) (Resolved) [I63.50]  Primary hypercoagulable state (H) [D68.59]         Your next Anticoagulation Clinic appointment(s)     Sep 18, 2018  5:45 PM CDT   Anticoagulation Visit with [place] [facility]   [facility]    [address] [address] [phone]              Contact Numbers     Please call [phone] with any problems or questions regarding your therapy.    If you need to cancel and/or reschedule your appointment please call one of the following numbers:  [facility] [phone]  [facility] [phone]  [facility] [phone]  [facility] [phone]  [place] - [phone]            September 2018 Details    Sun Mon Tue Wed Thu Fri Sat           1                 2               3               4      Hold   See details      5      2.5 mg         6      5 mg         7      5 mg         8      5 mg           9      5 mg         10      5 mg         11      5 mg         12      5 mg         13      5 mg         14      5 mg         15      5 mg           16      5 mg         17      5 mg         18            19               20               21               22                 23               24               25               26               27               28               29                 30                      Date Details   09/04 This INR check       Date of next INR:  9/18/2018         How to take your warfarin dose      To take:  2.5 mg Take 0.5 of a 5 mg tablet.    To take:  5 mg Take 1 of the 5 mg tablets.    Hold Do not take your warfarin dose. See the Details table to the right for additional instructions.

## 2018-09-04 NOTE — PROGRESS NOTES
ANTICOAGULATION FOLLOW-UP CLINIC VISIT    Patient Name:  Belkis Christopher  Date:  9/4/2018  Contact Type:  Face to Face    SUBJECTIVE:     Patient Findings     Positives Extra doses (Missed doses and then extra doses to compensate for them), Change in diet/appetite (Few ETOH over holiday weekend-not typical for her), Missed doses    Comments Med error - Missed doses and then extra doses to compensate for them  Give above changes, patient will hold warfarin today x 1 take 2.5 mg tomorrow only and then resume 5 mg daily.  Recheck INR in 2 weeks, or sooner should she have any notable changes in diet, medications, or activity.  Patient verbalizes understanding and agrees to plan. No further questions or concerns               OBJECTIVE    INR Protime   Date Value Ref Range Status   09/04/2018 4.8 (A) 0.86 - 1.14 Final       ASSESSMENT / PLAN  INR assessment SUPRA    Recheck INR In: 2 WEEKS    INR Location Clinic      Anticoagulation Summary as of 9/4/2018     INR goal 2.0-3.0   Today's INR 4.8!   Warfarin maintenance plan 5 mg (5 mg x 1) every day   Full warfarin instructions 9/4: Hold; 9/5: 2.5 mg; Otherwise 5 mg every day   Weekly warfarin total 35 mg   Plan last modified Fallon Huber, [facility] (6/23/2015)   Next INR check 9/18/2018   Priority INR   Target end date Indefinite    Indications   Long term current use of anticoagulant [Z79.01]  Cerebral artery occlusion with cerebral infarction (H) (Resolved) [I63.50]  Primary hypercoagulable state (H) [D68.59]         Anticoagulation Episode Summary     INR check location     Preferred lab     Send INR reminders to [facility]    Comments * CVA/TIA 2000 (carotid artery), also FVL (hetero). patient frequently misses doses when out of town        Anticoagulation Care Providers     Provider Role Specialty Phone number    Karina Shah,  [facility] Internal Medicine [phone]            See the Encounter Report to view Anticoagulation Flowsheet and  Dosing Calendar (Go to Encounters tab in chart review, and find the Anticoagulation Therapy Visit)        Sarah Bradford, [facility]

## 2018-09-21 ENCOUNTER — ANTICOAGULATION THERAPY VISIT (OUTPATIENT)
Dept: ANTICOAGULATION | Facility: CLINIC | Age: 43
End: 2018-09-21
Payer: COMMERCIAL

## 2018-09-21 DIAGNOSIS — Z79.01 LONG TERM CURRENT USE OF ANTICOAGULANT: ICD-10-CM

## 2018-09-21 DIAGNOSIS — D68.59 PRIMARY HYPERCOAGULABLE STATE (H): ICD-10-CM

## 2018-09-21 LAB — INR POINT OF CARE: 4 (ref 0.86–1.14)

## 2018-09-21 PROCEDURE — 36416 COLLJ CAPILLARY BLOOD SPEC: CPT

## 2018-09-21 PROCEDURE — 85610 PROTHROMBIN TIME: CPT | Mod: QW

## 2018-09-21 PROCEDURE — 99207 ZZC NO CHARGE NURSE ONLY: CPT

## 2018-09-21 NOTE — PROGRESS NOTES
ANTICOAGULATION FOLLOW-UP CLINIC VISIT    Patient Name:  Belkis Christopher  Date:  9/21/2018  Contact Type:  Face to Face    SUBJECTIVE:     Patient Findings     Positives Unexplained INR or factor level change    Comments Patient denies any changes in health, diet, activity, or medications. Patient had 35 mg in the past 7 days. Writer instructed patient to hold her Coumadin today then to resume with 5 mg daily. Patient will have a total of 25 mg by the next INR in 6 days. Maintenance dose may need to be reduced. Patient denies signs or symptoms of bleeding. Writer educated patient regarding increased bleed risk and when to seek immediate medical attention. Patient verbalized understanding.             OBJECTIVE    INR Protime   Date Value Ref Range Status   09/21/2018 4.0 (A) 0.86 - 1.14 Final       ASSESSMENT / PLAN  INR assessment SUPRA    Recheck INR In: 6 DAYS    INR Location Clinic      Anticoagulation Summary as of 9/21/2018     INR goal 2.0-3.0   Today's INR 4.0!   Warfarin maintenance plan 5 mg (5 mg x 1) every day   Full warfarin instructions 9/21: Hold; Otherwise 5 mg every day   Weekly warfarin total 35 mg   Plan last modified Fallon Huber [facility] (6/23/2015)   Next INR check 9/27/2018   Priority INR   Target end date Indefinite    Indications   Long term current use of anticoagulant [Z79.01]  Cerebral artery occlusion with cerebral infarction (H) (Resolved) [I63.50]  Primary hypercoagulable state (H) [D68.59]         Anticoagulation Episode Summary     INR check location     Preferred lab     Send INR reminders to [facility]    Comments * CVA/TIA 2000 (carotid artery), also FVL (hetero). patient frequently misses doses when out of town        Anticoagulation Care Providers     Provider Role Specialty Phone number    Karina Shah DO [facility] Internal Medicine [phone]            See the Encounter Report to view Anticoagulation Flowsheet and Dosing Calendar (Go to Encounters tab  in chart review, and find the Anticoagulation Therapy Visit)        Christian Cochran RN

## 2018-09-21 NOTE — MR AVS SNAPSHOT
Belkis Christopher   9/21/2018 9:15 AM   Anticoagulation Therapy Visit    Description:  42 year old female   Provider:  [place] [facility]   Department:  [facility]           INR as of 9/21/2018     Today's INR 4.0!      Anticoagulation Summary as of 9/21/2018     INR goal 2.0-3.0   Today's INR 4.0!   Full warfarin instructions 9/21: Hold; Otherwise 5 mg every day   Next INR check 9/27/2018    Indications   Long term current use of anticoagulant [Z79.01]  Cerebral artery occlusion with cerebral infarction (H) (Resolved) [I63.50]  Primary hypercoagulable state (H) [D68.59]         Your next Anticoagulation Clinic appointment(s)     Sep 21, 2018  9:15 AM CDT   Anticoagulation Visit with [place] [facility]   [facility]    [address] [address]   [phone]            Sep 27, 2018  3:00 PM CDT   Anticoagulation Visit with [place] [facility]   [facility]    [address] [address]   [phone]              Contact Numbers     Please call [phone] with any problems or questions regarding your therapy.    If you need to cancel and/or reschedule your appointment please call one of the following numbers:  [facility] - [phone]  [place] - [phone]  [place] - [phone]  [facility] [place] [phone]  [place] - [phone]            September 2018 Details    Sun Mon Tue Wed Thu Fri Sat           1                 2               3               4               5               6               7               8                 9               10               11               12               13               14               15                 16               17               18               19               20               21      Hold   See details      22      5 mg           23      5 mg         24      5 mg         25      5 mg         26      5 mg         27            28               29                  30                      Date Details   09/21 This INR check       Date of next INR:  9/27/2018         How to take your warfarin dose     To take:  5 mg Take 1 of the 5 mg tablets.    Hold Do not take your warfarin dose. See the Details table to the right for additional instructions.

## 2018-09-27 ENCOUNTER — ANTICOAGULATION THERAPY VISIT (OUTPATIENT)
Dept: ANTICOAGULATION | Facility: CLINIC | Age: 43
End: 2018-09-27
Payer: COMMERCIAL

## 2018-09-27 DIAGNOSIS — Z79.01 LONG TERM CURRENT USE OF ANTICOAGULANT: ICD-10-CM

## 2018-09-27 DIAGNOSIS — I63.9 STROKE (H): ICD-10-CM

## 2018-09-27 DIAGNOSIS — D68.59 PRIMARY HYPERCOAGULABLE STATE (H): ICD-10-CM

## 2018-09-27 DIAGNOSIS — I77.71 CAROTID ARTERY DISSECTION (H): ICD-10-CM

## 2018-09-27 DIAGNOSIS — I63.9 STROKE (H): Primary | ICD-10-CM

## 2018-09-27 LAB
INR POINT OF CARE: 4.7 (ref 0.86–1.14)
INR PPP: 3.33 (ref 0.86–1.14)

## 2018-09-27 PROCEDURE — 85613 RUSSELL VIPER VENOM DILUTED: CPT | Mod: 59 | Performed by: INTERNAL MEDICINE

## 2018-09-27 PROCEDURE — 85730 THROMBOPLASTIN TIME PARTIAL: CPT | Performed by: INTERNAL MEDICINE

## 2018-09-27 PROCEDURE — 83090 ASSAY OF HOMOCYSTEINE: CPT | Performed by: INTERNAL MEDICINE

## 2018-09-27 PROCEDURE — 85610 PROTHROMBIN TIME: CPT | Mod: QW

## 2018-09-27 PROCEDURE — 85597 PHOSPHOLIPID PLTLT NEUTRALIZ: CPT | Performed by: INTERNAL MEDICINE

## 2018-09-27 PROCEDURE — 00000401 ZZHCL STATISTIC THROMBIN TIME NC: Performed by: INTERNAL MEDICINE

## 2018-09-27 PROCEDURE — 99207 ZZC NO CHARGE NURSE ONLY: CPT

## 2018-09-27 PROCEDURE — 85613 RUSSELL VIPER VENOM DILUTED: CPT | Performed by: INTERNAL MEDICINE

## 2018-09-27 PROCEDURE — 85732 THROMBOPLASTIN TIME PARTIAL: CPT | Performed by: INTERNAL MEDICINE

## 2018-09-27 PROCEDURE — 85610 PROTHROMBIN TIME: CPT | Performed by: INTERNAL MEDICINE

## 2018-09-27 PROCEDURE — 36416 COLLJ CAPILLARY BLOOD SPEC: CPT

## 2018-09-27 NOTE — PROGRESS NOTES
ANTICOAGULATION FOLLOW-UP CLINIC VISIT    Patient Name:  Belkis Christopher  Date:  9/27/2018  Contact Type:  Face to Face    SUBJECTIVE:     Patient Findings     Positives Intentional hold of therapy (Previous hold on Friday), Unexplained INR or factor level change    Comments Patient denies any changes in health, medication, diet, or activity. Patient held her dose last Friday and decreased her dose on Saturday. INR remains elevate. Writer sent patient to the lab for a peripheral draw.            OBJECTIVE    INR Protime   Date Value Ref Range Status   09/27/2018 4.7 (A) 0.86 - 1.14 Final       ASSESSMENT / PLAN  INR assessment SUPRA    Recheck INR In: 1 DAY    INR Location Clinic      Anticoagulation Summary as of 9/27/2018     INR goal 2.0-3.0   Today's INR 4.7!   Warfarin maintenance plan 5 mg (5 mg x 1) every day   Full warfarin instructions 9/27: Hold; Otherwise 5 mg every day   Weekly warfarin total 35 mg   Plan last modified Fallon Huber [facility] (6/23/2015)   Next INR check 9/28/2018   Priority INR   Target end date Indefinite    Indications   Long term current use of anticoagulant [Z79.01]  Cerebral artery occlusion with cerebral infarction (H) (Resolved) [I63.50]  Primary hypercoagulable state (H) [D68.59]         Anticoagulation Episode Summary     INR check location     Preferred lab     Send INR reminders to [facility]    Comments * CVA/TIA 2000 (carotid artery), also FVL (hetero). patient frequently misses doses when out of town        Anticoagulation Care Providers     Provider Role Specialty Phone number    Karina Shah,  [facility] Internal Medicine [phone]            See the Encounter Report to view Anticoagulation Flowsheet and Dosing Calendar (Go to Encounters tab in chart review, and find the Anticoagulation Therapy Visit)        Christian Cochran RN

## 2018-09-27 NOTE — MR AVS SNAPSHOT
Belkis Christopher   9/27/2018 3:00 PM   Anticoagulation Therapy Visit    Description:  42 year old female   Provider:  [place] [facility]   Department:  [facility]           INR as of 9/27/2018     Today's INR 4.7!      Anticoagulation Summary as of 9/27/2018     INR goal 2.0-3.0   Today's INR 4.7!   Full warfarin instructions 9/27: Hold; Otherwise 5 mg every day   Next INR check 9/28/2018    Indications   Long term current use of anticoagulant [Z79.01]  Cerebral artery occlusion with cerebral infarction (H) (Resolved) [I63.50]  Primary hypercoagulable state (H) [D68.59]         Contact Numbers     Please call [phone] with any problems or questions regarding your therapy.    If you need to cancel and/or reschedule your appointment please call one of the following numbers:  [facility] [phone]  [place] - [phone]  [place] - [phone]  [place] - [phone]  [place] - [phone]            September 2018 Details    Sun Mon Tue Wed Thu Fri Sat           1                 2               3               4               5               6               7               8                 9               10               11               12               13               14               15                 16               17               18               19               20               21               22                 23               24               25               26               27      Hold   See details      28            29                 30                      Date Details   09/27 This INR check       Date of next INR:  9/28/2018         How to take your warfarin dose     To take:  5 mg Take 1 of the 5 mg tablets.    Hold Do not take your warfarin dose. See the Details table to the right for additional instructions.

## 2018-09-28 ENCOUNTER — ANTICOAGULATION THERAPY VISIT (OUTPATIENT)
Dept: ANTICOAGULATION | Facility: CLINIC | Age: 43
End: 2018-09-28
Payer: COMMERCIAL

## 2018-09-28 DIAGNOSIS — D68.59 PRIMARY HYPERCOAGULABLE STATE (H): ICD-10-CM

## 2018-09-28 DIAGNOSIS — Z79.01 LONG TERM CURRENT USE OF ANTICOAGULANT: ICD-10-CM

## 2018-09-28 PROCEDURE — 99207 ZZC NO CHARGE NURSE ONLY: CPT

## 2018-09-28 RX ORDER — WARFARIN SODIUM 5 MG/1
TABLET ORAL
Qty: 90 TABLET | Refills: 0 | COMMUNITY
Start: 2018-09-28 | End: 2019-01-08

## 2018-09-28 NOTE — PROGRESS NOTES
ANTICOAGULATION FOLLOW-UP CLINIC VISIT    Patient Name:  Belkis Christopher  Date:  9/28/2018  Contact Type:  Telephone    SUBJECTIVE:     Patient Findings     Positives Unexplained INR or factor level change    Comments INR from 9/27 was 3.33 via peripheral draw. Writer had instructed patient to hold her dose yesterday. Today, writer adjusted maintenance dose to 32.5 mg from 35 mg for a 7.1% decrease. Patient's INR has been elevated the last few visits with no known reasoning. Patient will recheck INR in 10 days. Patient denies signs or symptoms of bleeding. Writer educated patient regarding increased bleed risk and when to seek immediate medical attention. Patient verbalized understanding.             OBJECTIVE    INR   Date Value Ref Range Status   09/27/2018 3.33 (H) 0.86 - 1.14 Final       ASSESSMENT / PLAN  INR assessment SUPRA    Recheck INR In: 10 DAYS    INR Location Outside lab      Anticoagulation Summary as of 9/28/2018     INR goal 2.0-3.0   Today's INR 3.33! (9/27/2018)   Warfarin maintenance plan 2.5 mg (5 mg x 0.5) on Wed; 5 mg (5 mg x 1) all other days   Full warfarin instructions 2.5 mg on Wed; 5 mg all other days   Weekly warfarin total 32.5 mg   Plan last modified Christian Cochran RN (9/28/2018)   Next INR check 10/9/2018   Priority INR   Target end date Indefinite    Indications   Long term current use of anticoagulant [Z79.01]  Cerebral artery occlusion with cerebral infarction (H) (Resolved) [I63.50]  Primary hypercoagulable state (H) [D68.59]         Anticoagulation Episode Summary     INR check location     Preferred lab     Send INR reminders to [facility]    Comments * CVA/TIA 2000 (carotid artery), also FVL (hetero). patient frequently misses doses when out of town        Anticoagulation Care Providers     Provider Role Specialty Phone number    Karina Shah,  [facility] Internal Medicine [phone]            See the Encounter Report to view Anticoagulation  Flowsheet and Dosing Calendar (Go to Encounters tab in chart review, and find the Anticoagulation Therapy Visit)        Christian Cochran RN

## 2018-09-28 NOTE — MR AVS SNAPSHOT
Belkis Ashwin   9/28/2018   Anticoagulation Therapy Visit    Description:  42 year old female   Provider:  Christian Cochran, RN   Department:  [facility]           INR as of 9/28/2018     Today's INR 3.33! (9/27/2018)      Anticoagulation Summary as of 9/28/2018     INR goal 2.0-3.0   Today's INR 3.33! (9/27/2018)   Full warfarin instructions 2.5 mg on Wed; 5 mg all other days   Next INR check 10/9/2018    Indications   Long term current use of anticoagulant [Z79.01]  Cerebral artery occlusion with cerebral infarction (H) (Resolved) [I63.50]  Primary hypercoagulable state (H) [D68.59]         Your next Anticoagulation Clinic appointment(s)     Oct 09, 2018  4:15 PM CDT   Anticoagulation Visit with [place] [facility]   [facility]    [address] [address]   [phone]              September 2018 Details    Sun Mon Tue Wed Thu Fri Sat           1                 2               3               4               5               6               7               8                 9               10               11               12               13               14               15                 16               17               18               19               20               21               22                 23               24               25               26               27               28      5 mg   See details      29      5 mg           30      5 mg                Date Details   09/28 This INR check               How to take your warfarin dose     To take:  5 mg Take 1 of the 5 mg tablets.           October 2018 Details    Sun Mon Tue Wed Thu Fri Sat      1      5 mg         2      5 mg         3      2.5 mg         4      5 mg         5      5 mg         6      5 mg           7      5 mg         8      5 mg         9            10               11               12               13                 14               15               16                17               18               19               20                 21               22               23               24               25               26               27                 28               29               30               31                   Date Details   No additional details    Date of next INR:  10/9/2018         How to take your warfarin dose     To take:  2.5 mg Take 0.5 of a 5 mg tablet.    To take:  5 mg Take 1 of the 5 mg tablets.

## 2018-10-01 LAB — LA PPP-IMP: ABNORMAL

## 2018-10-03 LAB — HCYS SERPL-SCNC: 5.6 UMOL/L (ref 4–12)

## 2018-10-12 ENCOUNTER — ANTICOAGULATION THERAPY VISIT (OUTPATIENT)
Dept: ANTICOAGULATION | Facility: CLINIC | Age: 43
End: 2018-10-12
Payer: COMMERCIAL

## 2018-10-12 DIAGNOSIS — D68.59 PRIMARY HYPERCOAGULABLE STATE (H): ICD-10-CM

## 2018-10-12 DIAGNOSIS — Z79.01 LONG TERM CURRENT USE OF ANTICOAGULANT: ICD-10-CM

## 2018-10-12 LAB — INR POINT OF CARE: 3.4 (ref 0.86–1.14)

## 2018-10-12 PROCEDURE — 99207 ZZC NO CHARGE NURSE ONLY: CPT

## 2018-10-12 PROCEDURE — 85610 PROTHROMBIN TIME: CPT | Mod: QW

## 2018-10-12 PROCEDURE — 36416 COLLJ CAPILLARY BLOOD SPEC: CPT

## 2018-10-12 NOTE — PROGRESS NOTES
ANTICOAGULATION FOLLOW-UP CLINIC VISIT    Patient Name:  Belkis Christopher  Date:  10/12/2018  Contact Type:  Face to Face    SUBJECTIVE:     Patient Findings     Positives Missed doses, Unexplained INR or factor level change    Comments Patient denies any changes in diet, health, medication, or activity. Writer insructed the patient to continue on maintenance dose. Patient will incorporate greens into her diet 3 x a week. Patient denies signs or symptoms of bleeding. Writer educated patient regarding increased bleed risk and when to seek immediate medical attention. Patient verbalized understanding.             OBJECTIVE    INR Protime   Date Value Ref Range Status   10/12/2018 3.4 (A) 0.86 - 1.14 Final       ASSESSMENT / PLAN  INR assessment SUPRA    Recheck INR In: 3 WEEKS    INR Location Clinic      Anticoagulation Summary as of 10/12/2018     INR goal 2.0-3.0   Today's INR 3.4!   Warfarin maintenance plan 2.5 mg (5 mg x 0.5) on Wed; 5 mg (5 mg x 1) all other days   Full warfarin instructions 2.5 mg on Wed; 5 mg all other days   Weekly warfarin total 32.5 mg   No change documented Christian Cohcran, GEORGE   Plan last modified Christian Cochran, RN (9/28/2018)   Next INR check 10/30/2018   Priority INR   Target end date Indefinite    Indications   Long term current use of anticoagulant [Z79.01]  Cerebral artery occlusion with cerebral infarction (H) (Resolved) [I63.50]  Primary hypercoagulable state (H) [D68.59]         Anticoagulation Episode Summary     INR check location     Preferred lab     Send INR reminders to [facility]    Comments * CVA/TIA 2000 (carotid artery), also FVL (hetero). patient frequently misses doses when out of town        Anticoagulation Care Providers     Provider Role Specialty Phone number    Karina Shah DO [facility] Internal Medicine [phone]            See the Encounter Report to view Anticoagulation Flowsheet and Dosing Calendar (Go to Encounters tab in  chart review, and find the Anticoagulation Therapy Visit)        Christian Cochran RN

## 2018-10-12 NOTE — MR AVS SNAPSHOT
Belkis Johnsonser   10/12/2018 3:00 PM   Anticoagulation Therapy Visit    Description:  42 year old female   Provider:  [place] AN[facility] COAMARIANN   Department:  [facility]           INR as of 10/12/2018     Today's INR 3.4!      Anticoagulation Summary as of 10/12/2018     INR goal 2.0-3.0   Today's INR 3.4!   Full warfarin instructions 2.5 mg on Wed; 5 mg all other days   Next INR check 10/30/2018    Indications   Long term current use of anticoagulant [Z79.01]  Cerebral artery occlusion with cerebral infarction (H) (Resolved) [I63.50]  Primary hypercoagulable state (H) [D68.59]         Your next Anticoagulation Clinic appointment(s)     Oct 12, 2018  3:00 PM CDT   Anticoagulation Visit with [place] [facility]   [facility]    [address] [address] [phone]            Oct 29, 2018  3:45 PM CDT   Anticoagulation Visit with [place] [facility]   [facility]    [address] [address]   [phone]              Contact Numbers     Please call [phone] with any problems or questions regarding your therapy.    If you need to cancel and/or reschedule your appointment please call one of the following numbers:  [facility] - [phone]  [place] - [phone]  [place] - [phone]  [facility] [place] [phone]  [place] - [phone]            October 2018 Details    Sun Mon Tue Wed Thu Fri Sat      1               2               3               4               5               6                 7               8               9               10               11               12      5 mg   See details      13      5 mg           14      5 mg         15      5 mg         16      5 mg         17      2.5 mg         18      5 mg         19      5 mg         20      5 mg           21      5 mg         22      5 mg         23      5 mg         24      2.5 mg         25      5 mg         26      5 mg          27      5 mg           28      5 mg         29      5 mg         30            31                   Date Details   10/12 This INR check       Date of next INR:  10/30/2018         How to take your warfarin dose     To take:  2.5 mg Take 0.5 of a 5 mg tablet.    To take:  5 mg Take 1 of the 5 mg tablets.

## 2018-10-29 ENCOUNTER — ANTICOAGULATION THERAPY VISIT (OUTPATIENT)
Dept: ANTICOAGULATION | Facility: CLINIC | Age: 43
End: 2018-10-29
Payer: COMMERCIAL

## 2018-10-29 DIAGNOSIS — D68.59 PRIMARY HYPERCOAGULABLE STATE (H): ICD-10-CM

## 2018-10-29 DIAGNOSIS — Z79.01 LONG TERM CURRENT USE OF ANTICOAGULANT: ICD-10-CM

## 2018-10-29 LAB — INR POINT OF CARE: 2.1 (ref 0.86–1.14)

## 2018-10-29 PROCEDURE — 36416 COLLJ CAPILLARY BLOOD SPEC: CPT

## 2018-10-29 PROCEDURE — 85610 PROTHROMBIN TIME: CPT | Mod: QW

## 2018-10-29 PROCEDURE — 99207 ZZC NO CHARGE NURSE ONLY: CPT

## 2018-10-29 NOTE — PROGRESS NOTES
ANTICOAGULATION FOLLOW-UP CLINIC VISIT    Patient Name:  Belkis Christopher  Date:  10/29/2018  Contact Type:  Face to Face    SUBJECTIVE:     Patient Findings     Positives Antibiotic use or infection (Macrobid for UTI (prescribed in 2016, just used now))    Comments Patient's usual maintenance dose was 32.5mg per week. Her INRs have been running higher the last few months. Patient had a UTI and took an old antibiotic, macrobid (prescribed in 2016 but she never took it). She missed her doses over the weekend and her INR is still therapeutic. She only had 22.5mg in the past 7 days. Will plan to decrease maintenance dose to 27.5mg and recheck again in 8 days. The symptoms of the UTI are much better now.           OBJECTIVE    INR Protime   Date Value Ref Range Status   10/29/2018 2.1 (A) 0.86 - 1.14 Final       ASSESSMENT / PLAN  INR assessment THER    Recheck INR In: 8 DAYS    INR Location Clinic      Anticoagulation Summary as of 10/29/2018     INR goal 2.0-3.0   Today's INR 2.1   Warfarin maintenance plan 2.5 mg (5 mg x 0.5) on Mon, Wed, Fri; 5 mg (5 mg x 1) all other days   Full warfarin instructions 2.5 mg on Mon, Wed, Fri; 5 mg all other days   Weekly warfarin total 27.5 mg   Plan last modified Brandy Bose RN (10/29/2018)   Next INR check 11/6/2018   Priority INR   Target end date Indefinite    Indications   Long term current use of anticoagulant [Z79.01]  Cerebral artery occlusion with cerebral infarction (H) (Resolved) [I63.50]  Primary hypercoagulable state (H) [D68.59]         Anticoagulation Episode Summary     INR check location     Preferred lab     Send INR reminders to [facility]    Comments * CVA/TIA 2000 (carotid artery), also FVL (hetero). patient frequently misses doses when out of town        Anticoagulation Care Providers     Provider Role Specialty Phone number    Karina Shah,  [facility] Internal Medicine [phone]            See the Encounter Report to view  Anticoagulation Flowsheet and Dosing Calendar (Go to Encounters tab in chart review, and find the Anticoagulation Therapy Visit)        Brandy Bose RN [facility]

## 2018-10-29 NOTE — MR AVS SNAPSHOT
Belkis Christopher   10/29/2018 3:45 PM   Anticoagulation Therapy Visit    Description:  42 year old female   Provider:  [facility]   Department:  [facility]           INR as of 10/29/2018     Today's INR 2.1      Anticoagulation Summary as of 10/29/2018     INR goal 2.0-3.0   Today's INR 2.1   Full warfarin instructions 2.5 mg on Mon, Wed, Fri; 5 mg all other days   Next INR check 11/6/2018    Indications   Long term current use of anticoagulant [Z79.01]  Cerebral artery occlusion with cerebral infarction (H) (Resolved) [I63.50]  Primary hypercoagulable state (H) [D68.59]         Your next Anticoagulation Clinic appointment(s)     Nov 06, 2018  4:45 PM CST   Anticoagulation Visit with [place] [facility]   [facility]    [address] [address] [phone]              Contact Numbers     Please call [phone] with any problems or questions regarding your therapy.    If you need to cancel and/or reschedule your appointment please call one of the following numbers:  [facility] [phone]  [place] - [phone]  [facility] [phone]  [facility] [phone]  [place] - [phone]            October 2018 Details    Sun Mon Tue Wed Thu Fri Sat      1               2               3               4               5               6                 7               8               9               10               11               12               13                 14               15               16               17               18               19               20                 21               22               23               24               25               26               27                 28               29      2.5 mg   See details      30      5 mg         31      2.5 mg             Date Details   10/29 This INR check               How to take your warfarin dose     To take:  2.5 mg Take 0.5 of a 5 mg tablet.    To take:  5 mg  Take 1 of the 5 mg tablets.           November 2018 Details    Sun Mon Tue Wed Thu Fri Sat         1      5 mg         2      2.5 mg         3      5 mg           4      5 mg         5      2.5 mg         6            7               8               9               10                 11               12               13               14               15               16               17                 18               19               20               21               22               23               24                 25               26               27               28               29               30                 Date Details   No additional details    Date of next INR:  11/6/2018         How to take your warfarin dose     To take:  2.5 mg Take 0.5 of a 5 mg tablet.    To take:  5 mg Take 1 of the 5 mg tablets.

## 2018-11-06 ENCOUNTER — ANTICOAGULATION THERAPY VISIT (OUTPATIENT)
Dept: ANTICOAGULATION | Facility: CLINIC | Age: 43
End: 2018-11-06
Payer: COMMERCIAL

## 2018-11-06 DIAGNOSIS — D68.59 PRIMARY HYPERCOAGULABLE STATE (H): ICD-10-CM

## 2018-11-06 DIAGNOSIS — Z79.01 LONG TERM CURRENT USE OF ANTICOAGULANT: ICD-10-CM

## 2018-11-06 LAB — INR POINT OF CARE: 3 (ref 0.86–1.14)

## 2018-11-06 PROCEDURE — 99207 ZZC NO CHARGE NURSE ONLY: CPT

## 2018-11-06 PROCEDURE — 36416 COLLJ CAPILLARY BLOOD SPEC: CPT

## 2018-11-06 PROCEDURE — 85610 PROTHROMBIN TIME: CPT | Mod: QW

## 2018-11-06 NOTE — MR AVS SNAPSHOT
Belkisbuck Christopher   11/6/2018 4:45 PM   Anticoagulation Therapy Visit    Description:  42 year old female   Provider:  [facility]   Department:  [facility]           INR as of 11/6/2018     Today's INR 3.0      Anticoagulation Summary as of 11/6/2018     INR goal 2.0-3.0   Today's INR 3.0   Full warfarin instructions 2.5 mg on Mon, Wed, Fri; 5 mg all other days   Next INR check 11/27/2018    Indications   Long term current use of anticoagulant [Z79.01]  Cerebral artery occlusion with cerebral infarction (H) (Resolved) [I63.50]  Primary hypercoagulable state (H) [D68.59]         Your next Anticoagulation Clinic appointment(s)     Nov 27, 2018  4:45 PM CST   Anticoagulation Visit with [place] [facility]   [facility]    [address] [address] [phone]              Contact Numbers     Please call [phone] with any problems or questions regarding your therapy.    If you need to cancel and/or reschedule your appointment please call one of the following numbers:  [facility] [phone]  [place] - [phone]  [facility] [phone]  [facility] [phone]  [place] - [phone]            November 2018 Details    Sun Mon Tue Wed Thu Fri Sat         1               2               3                 4               5               6      5 mg   See details      7      2.5 mg         8      5 mg         9      2.5 mg         10      5 mg           11      5 mg         12      2.5 mg         13      5 mg         14      2.5 mg         15      5 mg         16      2.5 mg         17      5 mg           18      5 mg         19      2.5 mg         20      5 mg         21      2.5 mg         22      5 mg         23      2.5 mg         24      5 mg           25      5 mg         26      2.5 mg         27            28               29               30                 Date Details   11/06 This INR check       Date of next INR:  11/27/2018         How  to take your warfarin dose     To take:  2.5 mg Take 0.5 of a 5 mg tablet.    To take:  5 mg Take 1 of the 5 mg tablets.

## 2018-11-06 NOTE — PROGRESS NOTES
ANTICOAGULATION FOLLOW-UP CLINIC VISIT    Patient Name:  Belkis Christopher  Date:  11/6/2018  Contact Type:  Face to Face    SUBJECTIVE:     Patient Findings     Positives Extra doses (pt took 5 mg on 11-2 instead of 2.5 mg), Change in diet/appetite (mixed salad with spinach last week)    Comments Patient reports not taking any abx for over one week now. No signs of UTI either. She has hx of missing doses but states she has not missed any since last INR. She self dosed on 11-2 due to eating a large mixed salad that included spinach. She knows to maintain consistency with greens. Will have her continue 27.5 mg/week and recheck in 3 weeks. No other changes or concerns.           OBJECTIVE    INR Protime   Date Value Ref Range Status   11/06/2018 3.0 (A) 0.86 - 1.14 Final       ASSESSMENT / PLAN  INR assessment THER    Recheck INR In: 3 WEEKS    INR Location Clinic      Anticoagulation Summary as of 11/6/2018     INR goal 2.0-3.0   Today's INR 3.0   Warfarin maintenance plan 2.5 mg (5 mg x 0.5) on Mon, Wed, Fri; 5 mg (5 mg x 1) all other days   Full warfarin instructions 2.5 mg on Mon, Wed, Fri; 5 mg all other days   Weekly warfarin total 27.5 mg   No change documented Deb Roman RN   Plan last modified Brandy Bose, RN (10/29/2018)   Next INR check 11/27/2018   Priority INR   Target end date Indefinite    Indications   Long term current use of anticoagulant [Z79.01]  Cerebral artery occlusion with cerebral infarction (H) (Resolved) [I63.50]  Primary hypercoagulable state (H) [D68.59]         Anticoagulation Episode Summary     INR check location     Preferred lab     Send INR reminders to [facility]    Comments * CVA/TIA 2000 (carotid artery), also FVL (hetero). patient frequently misses doses when out of town        Anticoagulation Care Providers     Provider Role Specialty Phone number    Karina Shah DO [facility] Internal Medicine [phone]            See the Encounter Report  to view Anticoagulation Flowsheet and Dosing Calendar (Go to Encounters tab in chart review, and find the Anticoagulation Therapy Visit)        Deb Roman RN

## 2018-11-20 ENCOUNTER — HOSPITAL ENCOUNTER (EMERGENCY)
Facility: CLINIC | Age: 43
Discharge: HOME | End: 2018-11-21
Attending: EMERGENCY MEDICINE | Admitting: EMERGENCY MEDICINE
Payer: COMMERCIAL

## 2018-11-20 VITALS
HEART RATE: 79 BPM | DIASTOLIC BLOOD PRESSURE: 70 MMHG | HEIGHT: 67 IN | SYSTOLIC BLOOD PRESSURE: 122 MMHG | RESPIRATION RATE: 18 BRPM | TEMPERATURE: 98 F | OXYGEN SATURATION: 98 % | BODY MASS INDEX: 31.48 KG/M2

## 2018-11-20 DIAGNOSIS — N93.9 ABNORMAL UTERINE BLEEDING: ICD-10-CM

## 2018-11-20 LAB
ALBUMIN UR-MCNC: 30 MG/DL
ANION GAP SERPL CALCULATED.3IONS-SCNC: 9 MMOL/L (ref 3–14)
APPEARANCE UR: ABNORMAL
BASOPHILS # BLD AUTO: 0 10E9/L (ref 0–0.2)
BASOPHILS NFR BLD AUTO: 0.3 %
BILIRUB UR QL STRIP: NEGATIVE
BUN SERPL-MCNC: 17 MG/DL (ref 7–30)
CALCIUM SERPL-MCNC: 7.9 MG/DL (ref 8.5–10.1)
CHLORIDE SERPL-SCNC: 104 MMOL/L (ref 94–109)
CO2 SERPL-SCNC: 25 MMOL/L (ref 20–32)
COLOR UR AUTO: YELLOW
CREAT SERPL-MCNC: 0.75 MG/DL (ref 0.52–1.04)
DIFFERENTIAL METHOD BLD: ABNORMAL
EOSINOPHIL # BLD AUTO: 0.1 10E9/L (ref 0–0.7)
EOSINOPHIL NFR BLD AUTO: 1 %
ERYTHROCYTE [DISTWIDTH] IN BLOOD BY AUTOMATED COUNT: 13.4 % (ref 10–15)
GFR SERPL CREATININE-BSD FRML MDRD: 84 ML/MIN/1.7M2
GLUCOSE SERPL-MCNC: 99 MG/DL (ref 70–99)
GLUCOSE UR STRIP-MCNC: NEGATIVE MG/DL
HCG UR QL: NEGATIVE
HCT VFR BLD AUTO: 38.1 % (ref 35–47)
HGB BLD-MCNC: 13 G/DL (ref 11.7–15.7)
HGB UR QL STRIP: ABNORMAL
IMM GRANULOCYTES # BLD: 0.1 10E9/L (ref 0–0.4)
IMM GRANULOCYTES NFR BLD: 0.5 %
INR PPP: 1.38 (ref 0.86–1.14)
KETONES UR STRIP-MCNC: NEGATIVE MG/DL
LEUKOCYTE ESTERASE UR QL STRIP: NEGATIVE
LYMPHOCYTES # BLD AUTO: 1.6 10E9/L (ref 0.8–5.3)
LYMPHOCYTES NFR BLD AUTO: 14 %
MCH RBC QN AUTO: 30.2 PG (ref 26.5–33)
MCHC RBC AUTO-ENTMCNC: 34.1 G/DL (ref 31.5–36.5)
MCV RBC AUTO: 88 FL (ref 78–100)
MONOCYTES # BLD AUTO: 0.6 10E9/L (ref 0–1.3)
MONOCYTES NFR BLD AUTO: 5.1 %
MUCOUS THREADS #/AREA URNS LPF: PRESENT /LPF
NEUTROPHILS # BLD AUTO: 9.1 10E9/L (ref 1.6–8.3)
NEUTROPHILS NFR BLD AUTO: 79.1 %
NITRATE UR QL: NEGATIVE
NRBC # BLD AUTO: 0 10*3/UL
NRBC BLD AUTO-RTO: 0 /100
PH UR STRIP: 5 PH (ref 5–7)
PLATELET # BLD AUTO: 263 10E9/L (ref 150–450)
POTASSIUM SERPL-SCNC: 3.6 MMOL/L (ref 3.4–5.3)
RBC # BLD AUTO: 4.31 10E12/L (ref 3.8–5.2)
RBC #/AREA URNS AUTO: >182 /HPF (ref 0–2)
SODIUM SERPL-SCNC: 138 MMOL/L (ref 133–144)
SOURCE: ABNORMAL
SP GR UR STRIP: 1.03 (ref 1–1.03)
SQUAMOUS #/AREA URNS AUTO: 2 /HPF (ref 0–1)
UROBILINOGEN UR STRIP-MCNC: 0 MG/DL (ref 0–2)
WBC # BLD AUTO: 11.5 10E9/L (ref 4–11)
WBC #/AREA URNS AUTO: 9 /HPF (ref 0–5)

## 2018-11-20 PROCEDURE — 25000125 ZZHC RX 250: Performed by: EMERGENCY MEDICINE

## 2018-11-20 PROCEDURE — 85610 PROTHROMBIN TIME: CPT | Performed by: EMERGENCY MEDICINE

## 2018-11-20 PROCEDURE — 99284 EMERGENCY DEPT VISIT MOD MDM: CPT | Mod: Z6 | Performed by: EMERGENCY MEDICINE

## 2018-11-20 PROCEDURE — 96375 TX/PRO/DX INJ NEW DRUG ADDON: CPT | Performed by: EMERGENCY MEDICINE

## 2018-11-20 PROCEDURE — 81025 URINE PREGNANCY TEST: CPT | Performed by: EMERGENCY MEDICINE

## 2018-11-20 PROCEDURE — 85025 COMPLETE CBC W/AUTO DIFF WBC: CPT | Performed by: EMERGENCY MEDICINE

## 2018-11-20 PROCEDURE — 96361 HYDRATE IV INFUSION ADD-ON: CPT | Performed by: EMERGENCY MEDICINE

## 2018-11-20 PROCEDURE — 99284 EMERGENCY DEPT VISIT MOD MDM: CPT | Mod: 25 | Performed by: EMERGENCY MEDICINE

## 2018-11-20 PROCEDURE — 25000128 H RX IP 250 OP 636: Performed by: EMERGENCY MEDICINE

## 2018-11-20 PROCEDURE — 81001 URINALYSIS AUTO W/SCOPE: CPT | Performed by: EMERGENCY MEDICINE

## 2018-11-20 PROCEDURE — 96374 THER/PROPH/DIAG INJ IV PUSH: CPT | Performed by: EMERGENCY MEDICINE

## 2018-11-20 PROCEDURE — 80048 BASIC METABOLIC PNL TOTAL CA: CPT | Performed by: EMERGENCY MEDICINE

## 2018-11-20 RX ORDER — ONDANSETRON 2 MG/ML
4 INJECTION INTRAMUSCULAR; INTRAVENOUS ONCE
Status: COMPLETED | OUTPATIENT
Start: 2018-11-20 | End: 2018-11-20

## 2018-11-20 RX ORDER — TRANEXAMIC ACID 650 MG/1
1300 TABLET ORAL 2 TIMES DAILY
Qty: 40 TABLET | Refills: 0 | Status: SHIPPED | OUTPATIENT
Start: 2018-11-20 | End: 2018-11-20

## 2018-11-20 RX ORDER — ONDANSETRON 4 MG/1
4 TABLET, ORALLY DISINTEGRATING ORAL EVERY 8 HOURS PRN
Qty: 10 TABLET | Refills: 0 | Status: SHIPPED | OUTPATIENT
Start: 2018-11-20 | End: 2018-11-23

## 2018-11-20 RX ORDER — TRANEXAMIC ACID 650 MG/1
1300 TABLET ORAL 2 TIMES DAILY
Qty: 8 TABLET | Refills: 0 | Status: SHIPPED | OUTPATIENT
Start: 2018-11-20 | End: 2018-11-22

## 2018-11-20 RX ADMIN — SODIUM CHLORIDE, POTASSIUM CHLORIDE, SODIUM LACTATE AND CALCIUM CHLORIDE 1000 ML: 600; 310; 30; 20 INJECTION, SOLUTION INTRAVENOUS at 22:34

## 2018-11-20 RX ADMIN — TRANEXAMIC ACID 1 G: 1 INJECTION, SOLUTION INTRAVENOUS at 22:34

## 2018-11-20 RX ADMIN — ONDANSETRON 4 MG: 2 INJECTION INTRAMUSCULAR; INTRAVENOUS at 22:39

## 2018-11-20 NOTE — ED AVS SNAPSHOT
[facility] Emergency Department    [address] [address]    Phone:  [phone]    Fax:  [phone]                                       Belkis Christopher   MRN: [id number]    Department:  [facility] Emergency Department   Date of Visit:  11/20/2018           Patient Information     Date Of Birth          1975        Your diagnoses for this visit were:     Abnormal uterine bleeding        You were seen by Zeb Walls MD.      Follow-up Information     Follow up with Dona Self MD.    Specialty:  OB/Gyn    Contact information:    [address] [facility] [address] [facility] [address]  [phone]          Discharge Instructions       Return to the emergency department if you have worsening symptoms, repeated vomiting, lightheadedness, or other concerns.  Otherwise follow-up in OB/gyne clinic for recheck.    Your next 10 appointments already scheduled     Nov 27, 2018  4:45 PM CST   Anticoagulation Visit with [facility] ([facility])    [address] [id number] [phone]              24 Hour Appointment Hotline       To make an appointment at any [facility], call [phone]). If you don't have a family doctor or clinic, we will help you find one. [facility] are conveniently located to serve the needs of you and your family.             Review of your medicines      START taking        Dose / Directions Last dose taken    ondansetron 4 MG ODT tab   Commonly known as:  ZOFRAN ODT   Dose:  4 mg   Quantity:  10 tablet        Take 1 tablet (4 mg) by mouth every 8 hours as needed   Refills:  0        tranexamic acid 650 MG tablet   Commonly known as:  LYSTEDA   Dose:  1300 mg   Quantity:  8 tablet        Take 2 tablets (1,300 mg) by mouth 2 times daily for 2 days   Refills:  0          Our records show that you are taking the medicines listed below. If these are incorrect, please call  your family doctor or clinic.        Dose / Directions Last dose taken    ASPIRIN NOT PRESCRIBED   Commonly known as:  INTENTIONAL   Dose:  1 each   Quantity:  0 each        1 each daily Antiplatelet medication not prescribed intentionally due to Current anticoagulant therapy (warfarin/enoxaparin)   Refills:  0        atorvastatin 40 MG tablet   Commonly known as:  LIPITOR   Dose:  40 mg   Quantity:  90 tablet        Take 1 tablet (40 mg) by mouth daily   Refills:  3        cholecalciferol 5000 units Caps   Commonly known as:  D 5000   Dose:  5000 Units   Quantity:  90 capsule        Take 1 capsule (5,000 Units) by mouth daily   Refills:  3        coenzyme Q-10 200 MG Caps   Dose:  200 mg        Take 200 mg by mouth daily   Refills:  0        warfarin 5 MG tablet   Commonly known as:  COUMADIN   Quantity:  90 tablet        2.5 mg (5 mg x 0.5) on Mon, Wed, Fri; 5 mg (5 mg x 1) all other days or As directed by Anticoagulation Clinic   Refills:  0                Prescriptions were sent or printed at these locations (2 Prescriptions)                   [place] [address] [facility]   [address] [address]    Telephone:  [phone]   Fax:  [phone]   Hours:                  E-Prescribed (1 of 1)         tranexamic acid (LYSTEDA) 650 MG tablet                     Other Prescriptions                Printed at Department/Unit printer (1 of 1)         ondansetron (ZOFRAN ODT) 4 MG ODT tab                Procedures and tests performed during your visit     Basic metabolic panel    CBC with platelets differential    HCG qualitative urine    INR    Peripheral IV catheter    UA reflex to Microscopic      Orders Needing Specimen Collection     None      Pending Results     No orders found for last 3 day(s).            Pending Culture Results     No orders found for last 3 day(s).            Pending Results Instructions     If you had any lab results that were not finalized at the  time of your Discharge, you can call the ED Lab Result RN at [phone]. You will be contacted by this team for any positive Lab results or changes in treatment. The nurses are available 7 days a week from 10A to 6:30P.  You can leave a message 24 hours per day and they will return your call.        Test Results From Your Hospital Stay        11/20/2018 11:08 PM      Component Results     Component Value Ref Range & Units Status    Color Urine Yellow  Final    Appearance Urine Slightly Cloudy  Final    Glucose Urine Negative NEG^Negative mg/dL Final    Bilirubin Urine Negative NEG^Negative Final    Ketones Urine Negative NEG^Negative mg/dL Final    Specific Gravity Urine 1.026 1.003 - 1.035 Final    Blood Urine Large (A) NEG^Negative Final    pH Urine 5.0 5.0 - 7.0 pH Final    Protein Albumin Urine 30 (A) NEG^Negative mg/dL Final    Urobilinogen mg/dL 0.0 0.0 - 2.0 mg/dL Final    Nitrite Urine Negative NEG^Negative Final    Leukocyte Esterase Urine Negative NEG^Negative Final    Source Midstream Urine  Final    RBC Urine >182 (H) 0 - 2 /HPF Final    WBC Urine 9 (H) 0 - 5 /HPF Final    Squamous Epithelial /HPF Urine 2 (H) 0 - 1 /HPF Final    Mucous Urine Present (A) NEG^Negative /LPF Final         11/20/2018 10:03 PM      Component Results     Component Value Ref Range & Units Status    HCG Qual Urine Negative NEG^Negative Final    This test is for screening purposes.  Results should be interpreted along with   the clinical picture.  Confirmation testing is available if warranted by   ordering CNX026, HCG Quantitative Pregnancy.           11/20/2018 11:09 PM      Component Results     Component Value Ref Range & Units Status    INR 1.38 (H) 0.86 - 1.14 Final         11/20/2018 11:01 PM      Component Results     Component Value Ref Range & Units Status    Sodium 138 133 - 144 mmol/L Final    Potassium 3.6 3.4 - 5.3 mmol/L Final    Chloride 104 94 - 109 mmol/L Final    Carbon Dioxide 25 20 - 32 mmol/L Final    Anion  Gap 9 3 - 14 mmol/L Final    Glucose 99 70 - 99 mg/dL Final    Urea Nitrogen 17 7 - 30 mg/dL Final    Creatinine 0.75 0.52 - 1.04 mg/dL Final    GFR Estimate 84 >60 mL/min/1.7m2 Final    Non  GFR Calc    GFR Estimate If Black >90 >60 mL/min/1.7m2 Final    African American GFR Calc    Calcium 7.9 (L) 8.5 - 10.1 mg/dL Final         11/20/2018 10:50 PM      Component Results     Component Value Ref Range & Units Status    WBC 11.5 (H) 4.0 - 11.0 10e9/L Final    RBC Count 4.31 3.8 - 5.2 10e12/L Final    Hemoglobin 13.0 11.7 - 15.7 g/dL Final    Hematocrit 38.1 35.0 - 47.0 % Final    MCV 88 78 - 100 fl Final    MCH 30.2 26.5 - 33.0 pg Final    MCHC 34.1 31.5 - 36.5 g/dL Final    RDW 13.4 10.0 - 15.0 % Final    Platelet Count 263 150 - 450 10e9/L Final    Diff Method Automated Method  Final    % Neutrophils 79.1 % Final    % Lymphocytes 14.0 % Final    % Monocytes 5.1 % Final    % Eosinophils 1.0 % Final    % Basophils 0.3 % Final    % Immature Granulocytes 0.5 % Final    Nucleated RBCs 0 0 /100 Final    Absolute Neutrophil 9.1 (H) 1.6 - 8.3 10e9/L Final    Absolute Lymphocytes 1.6 0.8 - 5.3 10e9/L Final    Absolute Monocytes 0.6 0.0 - 1.3 10e9/L Final    Absolute Eosinophils 0.1 0.0 - 0.7 10e9/L Final    Absolute Basophils 0.0 0.0 - 0.2 10e9/L Final    Abs Immature Granulocytes 0.1 0 - 0.4 10e9/L Final    Absolute Nucleated RBC 0.0  Final                Thank you for choosing [facility]       Thank you for choosing [facility] for your care. Our goal is always to provide you with excellent care. Hearing back from our patients is one way we can continue to improve our services. Please take a few minutes to complete the written survey that you may receive in the mail after you visit with us. Thank you!        [facility] Information     [facility] gives you secure access to your electronic health record. If you see a primary care provider, you can also send messages to your care team and make appointments. If you have  questions, please call your primary care clinic.  If you do not have a primary care provider, please call [phone] and they will assist you.        Care EveryWhere ID     This is your Care EveryWhere ID. This could be used by other organizations to access your [facility] medical records  [id number]        Equal Access to Services     YAA LINDQUIST : Lakesha Perkins, mary carmen munguia, alejandro joshi. [facility] [phone].    ATENCIÓN: Si habla español, tiene a mckinley disposición servicios gratuitos de asistencia lingüística. Llame al [phone].    We comply with applicable federal civil rights laws and [place] laws. We do not discriminate on the basis of race, color, national origin, age, disability, sex, sexual orientation, or gender identity.            After Visit Summary       This is your record. Keep this with you and show to your community pharmacist(s) and doctor(s) at your next visit.

## 2018-11-20 NOTE — ED AVS SNAPSHOT
[facility] Emergency Department    [address] [address]    Phone:  [phone]    Fax:  [phone]                                       Belkis Christopher   MRN: [id number]    Department:  [facility] Emergency Department   Date of Visit:  11/20/2018           After Visit Summary Signature Page     I have received my discharge instructions, and my questions have been answered. I have discussed any challenges I see with this plan with the nurse or doctor.    ..........................................................................................................................................  Patient/Patient Representative Signature      ..........................................................................................................................................  Patient Representative Print Name and Relationship to Patient    ..................................................               ................................................  Date                                   Time    ..........................................................................................................................................  Reviewed by Signature/Title    ...................................................              ..............................................  Date                                               Time          22EPIC Rev 08/18

## 2018-11-21 NOTE — DISCHARGE INSTRUCTIONS
Return to the emergency department if you have worsening symptoms, repeated vomiting, lightheadedness, or other concerns.  Otherwise follow-up in OB/gyne clinic for recheck.

## 2018-11-21 NOTE — ED PROVIDER NOTES
History     Chief Complaint   Patient presents with     Vaginal Bleeding     started today. passing clots. states has this with last couple of periods. going thru a tampon per hour. doesn't think she is pregnant     HPI  Belkis Christopher is a 42 year old female who presents for vaginal bleeding.  Started this AM.  Described as heavier than her normal period, going through 1 pad in less than 1 hour.  She does have abdominal pain.  Pain is localized to suprapubic region, crampy, rated as moderate.  No other vaginal discharge.  No vulvar irritation.  She reports that she is not pregnant. She does have nausea and she has felt light headed with ambulation.  She does not have fever, chest pain, shortness of breath, vomiting, diarrhea, dysuria, rash, weakness, paresthesias.  She does have a history of factor V Leiden mutation and prior stroke and is taking warfarin.    Problem List:    Patient Active Problem List    Diagnosis Date Noted     Heavy period 02/05/2015     Priority: Medium     Long term current use of anticoagulant 06/07/2011     Priority: Medium     Vitamin D deficiency 03/27/2011     Priority: Medium     On supplement.        Subclinical hypothyroidism 07/28/2010     Priority: Medium     History of hypothyroid, was previously on levothyroxine 75 mcg (1/2 pill) in the past.  Has not been on this since at least 2014.  TSH in the past borderline high (2.77 in 2005, 5.5 in 2009).  T4 has always been normal.  She reports when her thyroid test has been abnormal it was during times when her period was abnormal.  She has not noted any worsening of problems since being off the thyroid medication.  Reports symptoms of hypothyroid - fatigue, heavy periods       Malaise and fatigue 07/09/2008     Priority: Medium     Hyperlipidemia LDL goal <70 06/28/2007     Priority: Medium     Lipid panel in 2009 Total Chol 250, HDL 52, , Trig 118       History of stroke 06/27/2007     Priority: Medium     Angiodysplasia of left  "carotid, aphasia.  Right hand symptoms.          Primary hypercoagulable state (H) 06/25/2007     Priority: Medium     Combo of cardiolipin antibody and heterzygote for Factor V Leiden.  Factor II mutation was negative.    Found in 2000 after she had a stroke.  She had injury to her carotid artery - sounds like maybe a dissection?  She was also on OCP at that time as well, thought to contribute to the stroke.  Has been on warfarin ever since          Past Medical History:    Past Medical History:   Diagnosis Date     Factor 5 Leiden mutation, heterozygous (H)      High cholesterol      TIA (transient ischemic attack)        Past Surgical History:    No past surgical history on file.    Family History:    Family History   Problem Relation Age of Onset     Hypertension Father        Social History:  Marital Status:   [2]  Social History   Substance Use Topics     Smoking status: Never Smoker     Smokeless tobacco: Never Used     Alcohol use Yes      Comment: occ.        Medications:      ondansetron (ZOFRAN ODT) 4 MG ODT tab   tranexamic acid (LYSTEDA) 650 MG tablet   ASPIRIN NOT PRESCRIBED, INTENTIONAL,   atorvastatin (LIPITOR) 40 MG tablet   cholecalciferol (D 5000) 5000 units CAPS   coenzyme Q-10 200 MG CAPS   warfarin (COUMADIN) 5 MG tablet         Review of Systems  Pertinent positives and negatives listed in the HPI, all other systems reviewed and are negative.    Physical Exam   BP: 120/73  Pulse: 79  Heart Rate: 80  Temp: 98  F (36.7  C)  Resp: 16  Height: 170.2 cm (5' 7\")  SpO2: 98 %      Physical Exam   Constitutional: She is oriented to person, place, and time. She appears well-developed and well-nourished. She appears distressed.   HENT:   Head: Normocephalic and atraumatic.   Right Ear: External ear normal.   Left Ear: External ear normal.   Nose: Nose normal.   Eyes: Conjunctivae are normal. No scleral icterus.   Neck: Normal range of motion.   Cardiovascular: Normal rate and regular rhythm.  "   Pulmonary/Chest: Effort normal. No stridor. No respiratory distress.   Abdominal: Soft. She exhibits no distension. There is tenderness in the suprapubic area. There is no rigidity and no guarding.   Genitourinary: Cervix exhibits no discharge. There is bleeding in the vagina. No signs of injury around the vagina. No vaginal discharge found.   Genitourinary Comments: Moderate bleeding from the cervical loss without clots present currently exam performed with LINNEA Nicolas   Neurological: She is alert and oriented to person, place, and time.   Skin: Skin is warm and dry. She is not diaphoretic.   Psychiatric: She has a normal mood and affect. Her behavior is normal.   Nursing note and vitals reviewed.      ED Course     ED Course     Procedures               Critical Care time:  none               Results for orders placed or performed during the hospital encounter of 11/20/18 (from the past 24 hour(s))   UA reflex to Microscopic   Result Value Ref Range    Color Urine Yellow     Appearance Urine Slightly Cloudy     Glucose Urine Negative NEG^Negative mg/dL    Bilirubin Urine Negative NEG^Negative    Ketones Urine Negative NEG^Negative mg/dL    Specific Gravity Urine 1.026 1.003 - 1.035    Blood Urine Large (A) NEG^Negative    pH Urine 5.0 5.0 - 7.0 pH    Protein Albumin Urine 30 (A) NEG^Negative mg/dL    Urobilinogen mg/dL 0.0 0.0 - 2.0 mg/dL    Nitrite Urine Negative NEG^Negative    Leukocyte Esterase Urine Negative NEG^Negative    Source Midstream Urine     RBC Urine >182 (H) 0 - 2 /HPF    WBC Urine 9 (H) 0 - 5 /HPF    Squamous Epithelial /HPF Urine 2 (H) 0 - 1 /HPF    Mucous Urine Present (A) NEG^Negative /LPF   HCG qualitative urine   Result Value Ref Range    HCG Qual Urine Negative NEG^Negative   INR   Result Value Ref Range    INR 1.38 (H) 0.86 - 1.14   Basic metabolic panel   Result Value Ref Range    Sodium 138 133 - 144 mmol/L    Potassium 3.6 3.4 - 5.3 mmol/L    Chloride 104 94 - 109 mmol/L    Carbon  Dioxide 25 20 - 32 mmol/L    Anion Gap 9 3 - 14 mmol/L    Glucose 99 70 - 99 mg/dL    Urea Nitrogen 17 7 - 30 mg/dL    Creatinine 0.75 0.52 - 1.04 mg/dL    GFR Estimate 84 >60 mL/min/1.7m2    GFR Estimate If Black >90 >60 mL/min/1.7m2    Calcium 7.9 (L) 8.5 - 10.1 mg/dL   CBC with platelets differential   Result Value Ref Range    WBC 11.5 (H) 4.0 - 11.0 10e9/L    RBC Count 4.31 3.8 - 5.2 10e12/L    Hemoglobin 13.0 11.7 - 15.7 g/dL    Hematocrit 38.1 35.0 - 47.0 %    MCV 88 78 - 100 fl    MCH 30.2 26.5 - 33.0 pg    MCHC 34.1 31.5 - 36.5 g/dL    RDW 13.4 10.0 - 15.0 %    Platelet Count 263 150 - 450 10e9/L    Diff Method Automated Method     % Neutrophils 79.1 %    % Lymphocytes 14.0 %    % Monocytes 5.1 %    % Eosinophils 1.0 %    % Basophils 0.3 %    % Immature Granulocytes 0.5 %    Nucleated RBCs 0 0 /100    Absolute Neutrophil 9.1 (H) 1.6 - 8.3 10e9/L    Absolute Lymphocytes 1.6 0.8 - 5.3 10e9/L    Absolute Monocytes 0.6 0.0 - 1.3 10e9/L    Absolute Eosinophils 0.1 0.0 - 0.7 10e9/L    Absolute Basophils 0.0 0.0 - 0.2 10e9/L    Abs Immature Granulocytes 0.1 0 - 0.4 10e9/L    Absolute Nucleated RBC 0.0        Medications   lactated ringers BOLUS 1,000 mL (1,000 mLs Intravenous New Bag 18)   tranexamic acid (CYKLOKAPRON) 1 g in sodium chloride 0.9 % 60 mL bolus (1 g Intravenous Given 18)   ondansetron (ZOFRAN) injection 4 mg (4 mg Intravenous Given 18)       Assessments & Plan (with Medical Decision Making)   42 year old female who presents with vaginal bleeding. Differential includes pregnancy, ectopic pregnancy, spontaneous , cervicitis, ovulation vs anovulation, fibroid, cervical lesion, hormonal dysfunction.  Thyroid testing was normal in recent history, no indication for recheck.  Urine pregnancy test is negative, unlikely ectopic pregnancy.  Blood pressure 120/73, heart rate 79, temperature is 98  F, SPO2 is 98% on room air.  She is given IV fluids, ondansetron, and  tranexamic acid.  Electrolytes are within normal limits.  INR slightly subtherapeutic.  Hemoglobin is normal no indication for transfusion at this time.  On pelvic exam she has blood coming from the office but not concerning amount.  She feels as if this is improved with the tranexamic acid.  I have discussed the case with the on-call OB/gyne physician, Dr. Self.  We discussed options including progestin only birth control versus oral tranexamic acid versus surgical ligation or hysterectomy.  The patient was uncomfortable taking progestin birth control as she says that she had a stroke when she was on birth control in the past and does not want to risk it.  She was okay with trying the tranexamic acid, however pharmacy says that the prescription is going to cost over $100 for a 10-day supply.  The patient said that she could not feel that.  We discussed trying a 2-day supply to see if this would help and then she could feel more as needed.  She said that she would think about this and asked me to send that prescription to pharmacy.  At this point she is feeling better and is safe to discharge with instructions to follow-up in OB/GYN clinic or return if worse.  The patient is in agreement with this plan.    I have reviewed the nursing notes.    I have reviewed the findings, diagnosis, plan and need for follow up with the patient.       New Prescriptions    ONDANSETRON (ZOFRAN ODT) 4 MG ODT TAB    Take 1 tablet (4 mg) by mouth every 8 hours as needed    TRANEXAMIC ACID (LYSTEDA) 650 MG TABLET    Take 2 tablets (1,300 mg) by mouth 2 times daily for 2 days       Final diagnoses:   Abnormal uterine bleeding       11/20/2018   [facility] EMERGENCY DEPARTMENT     Zeb Walls MD  11/21/18 0004

## 2018-11-21 NOTE — ED NOTES
Patient here with c/o heavy vaginal bleeding, about 1 tampon an hour, starting today. Per patient she has been on warfarin for many years & periods have always been abnormal with spotting & heavy bleeding, but nothing to this extent. Also endorsing SOB &  lightheadedness upon standing.

## 2018-11-21 NOTE — ED NOTES
Report received from RN and all questions answered. Entered PT room and found PT resting on gurney in position of comfort. All needs are being assessed and will be met and all comfort measures are being addressed. Awaiting MD dispo at this time.

## 2018-11-27 ENCOUNTER — OFFICE VISIT (OUTPATIENT)
Dept: OBGYN | Facility: CLINIC | Age: 43
End: 2018-11-27
Payer: COMMERCIAL

## 2018-11-27 VITALS
TEMPERATURE: 98.7 F | DIASTOLIC BLOOD PRESSURE: 77 MMHG | BODY MASS INDEX: 33.3 KG/M2 | HEIGHT: 66 IN | HEART RATE: 77 BPM | WEIGHT: 207.2 LBS | RESPIRATION RATE: 18 BRPM | SYSTOLIC BLOOD PRESSURE: 121 MMHG

## 2018-11-27 DIAGNOSIS — Z86.73 HISTORY OF STROKE: ICD-10-CM

## 2018-11-27 DIAGNOSIS — Z79.01 LONG TERM CURRENT USE OF ANTICOAGULANT: ICD-10-CM

## 2018-11-27 DIAGNOSIS — N92.0 MENORRHAGIA WITH REGULAR CYCLE: Primary | ICD-10-CM

## 2018-11-27 PROCEDURE — 99203 OFFICE O/P NEW LOW 30 MIN: CPT | Mod: 57 | Performed by: OBSTETRICS & GYNECOLOGY

## 2018-11-27 NOTE — PROGRESS NOTES
CC:  Consult from ED for Menorrhagia in light of chronic anticoagulaiton  HPI:  Belkis Christopher is a 42 year old female is a   P0.   Menses are regular q 28-30 days, lasting 5 days. She had a very heavy period resulting in a near syncopal episode.  She was changing a super plus tampon every hour.  At this point in time she is taking extra closed to work because she never knows when she will be bleeding.  She suffers from factor V Leiden mutation.  She was diagnosed with a stroke in 2000 while on oral contraceptives.  She also has anticardiolipin antibody and lupus anticoagulant.  Her plan is to be on Coumadin indefinitely.    Menarche was at age 12.  Her periods have never been normal.  She has a history of severe cramping causing her to miss school or work.  She is in bed for 2 days.  She was treated with tranexamic acid in the emergency department which helped with the bleeding.  She has never been pregnant.  Uses condoms and infertility for contraception.      Patients records are available and reviewed at today's visit.    Past GYN history:  No STD history       Last PAP smear:  Normal  Last TSH:   TSH   Date Value Ref Range Status   05/02/2018 4.61 (H) 0.40 - 4.00 mU/L Final    , normal?  No    Past Medical History:   Diagnosis Date     Factor 5 Leiden mutation, heterozygous (H)      High cholesterol      TIA (transient ischemic attack)     20years ago       History reviewed. No pertinent surgical history.    Family History   Problem Relation Age of Onset     Hypertension Father        Allergies: Review of patient's allergies indicates no known allergies.    Current Outpatient Prescriptions   Medication Sig Dispense Refill     atorvastatin (LIPITOR) 40 MG tablet Take 1 tablet (40 mg) by mouth daily 90 tablet 3     cholecalciferol (D 5000) 5000 units CAPS Take 1 capsule (5,000 Units) by mouth daily 90 capsule 3     coenzyme Q-10 200 MG CAPS Take 200 mg by mouth daily       Ferrous Sulfate (IRON SUPPLEMENT PO)         warfarin (COUMADIN) 5 MG tablet 2.5 mg (5 mg x 0.5) on Mon, Wed, Fri; 5 mg (5 mg x 1) all other days or As directed by Anticoagulation Clinic 90 tablet 0     ASPIRIN NOT PRESCRIBED, INTENTIONAL, 1 each daily Antiplatelet medication not prescribed intentionally due to Current anticoagulant therapy (warfarin/enoxaparin) 0 each 0       ROS:  C: NEGATIVE for fever, chills, change in weight  I: NEGATIVE for worrisome rashes, moles or lesions  E: NEGATIVE for vision changes or irritation  E/M: NEGATIVE for ear, mouth and throat problems  R: NEGATIVE for significant cough or SOB  CV: NEGATIVE for chest pain, palpitations or peripheral edema  GI: NEGATIVE for nausea, abdominal pain, heartburn, or change in bowel habits  : NEGATIVE for frequency, dysuria, hematuria, vaginal discharge  M: NEGATIVE for significant arthralgias or myalgia  N: NEGATIVE for weakness, dizziness or paresthesias  E: NEGATIVE for temperature intolerance, skin/hair changes  P: NEGATIVE for changes in mood or affect    EXAM:  not currently breastfeeding.   BMI= There is no height or weight on file to calculate BMI.  General - pleasant female in no acute distress.  Abdomen - soft, nontender, nondistended, no hepatosplenomegaly.  Pelvic - EG: normal adult female,   BUS: within normal limits,   Vagina: well rugated, dark blood present,   Cervix: no lesions or CMT,   Uterus: firm, normal sized and nontender,   Adnexae: no masses or tenderness.  Rectovaginal - deferred.  Musculoskeletal - no gross deformities.  Neurological - normal strength, sensation, and mental status.      ASSESSMENT/PLAN:  (N92.0) Menorrhagia with regular cycle  (primary encounter diagnosis)  Comment: Chronic anticoagulation  Plan: Endometrial ablation, hysteroscopy and endometrial sampling.    (Z79.01) Long term current use of anticoagulant  (Z86.73) History of stroke  Comment: Factor V Leiden mutation as well as anticardiolipin antibody and lupus anticoagulant    I described the  procedures as indicated above. The types of anesthesia were reviewed. The pre and post-op expectations were noted. We discussed the risks and benefits of the above procedures. The risk of bleeding, infection and damage to other organs was reviewed. The possibility of failure to achieve amenorrhea was discussed.  No guarantees were made.  She did express understanding and desires to proceed with surgery.          Letter will be sent to the referring provider.    Adis Brower

## 2018-11-27 NOTE — MR AVS SNAPSHOT
After Visit Summary   11/27/2018    Belkis Christopher    MRN: [id number]           Patient Information     Date Of Birth          1975        Visit Information        Provider Department      11/27/2018 2:30 PM Adis Brower MD [facility]        Today's Diagnoses     Menorrhagia with regular cycle    -  1    Long term current use of anticoagulant        History of stroke           Follow-ups after your visit        Follow-up notes from your care team     Return for Endometrial ablation.      Your next 10 appointments already scheduled     Dec 07, 2018  3:00 PM CST   Anticoagulation Visit with [place] ANTI COAG   [facility]    [address] [address] [phone]              Who to contact     If you have questions or need follow up information about today's clinic visit or your schedule please contact [facility] directly at [phone].  Normal or non-critical lab and imaging results will be communicated to you by MyChart, letter or phone within 4 business days after the clinic has received the results. If you do not hear from us within 7 days, please contact the clinic through [employer] or phone. If you have a critical or abnormal lab result, we will notify you by phone as soon as possible.  Submit refill requests through [employer] or call your pharmacy and they will forward the refill request to us. Please allow 3 business days for your refill to be completed.          Additional Information About Your Visit        MyChart Information     [employer] gives you secure access to your electronic health record. If you see a primary care provider, you can also send messages to your care team and make appointments. If you have questions, please call your primary care clinic.  If you do not have a primary care provider, please call [phone] and they will assist you.        Care EveryWhere ID     This is  "your Care EveryWhere ID. This could be used by other organizations to access your Center medical records  [id number]        Your Vitals Were     Pulse Temperature Respirations Height Last Period BMI (Body Mass Index)    77 98.7  F (37.1  C) 18 5' 6\" (1.676 m) 11/19/2018 33.44 kg/m2       Blood Pressure from Last 3 Encounters:   11/27/18 121/77   11/20/18 122/70   05/17/18 115/74    Weight from Last 3 Encounters:   11/27/18 207 lb 3.2 oz (94 kg)   05/17/18 201 lb (91.2 kg)   05/02/18 201 lb (91.2 kg)              We Performed the Following     Cinthya-Operative Worksheet GYN        Primary Care Provider Office Phone # Fax #    Karina Markham DO [place] [phone] [address] [address]        Equal Access to Services     YAA LINDQUIST : Hadii geri nunn hadasho Soomaali, waaxda luqadaha, qaybta kaalmada adeegyada, alejandro warrenin angela nicolas . So [facility] [phone].    ATENCIÓN: Si habla español, tiene a mckinley disposición servicios gratuitos de asistencia lingüística. Llame al [phone].    We comply with applicable federal civil rights laws and [place] laws. We do not discriminate on the basis of race, color, national origin, age, disability, sex, sexual orientation, or gender identity.            Thank you!     Thank you for choosing [facility]  for your care. Our goal is always to provide you with excellent care. Hearing back from our patients is one way we can continue to improve our services. Please take a few minutes to complete the written survey that you may receive in the mail after your visit with us. Thank you!             Your Updated Medication List - Protect others around you: Learn how to safely use, store and throw away your medicines at www.disposemymeds.org.          This list is accurate as of 11/27/18 11:59 PM.  Always use your most recent med list.                   Brand Name Dispense Instructions for use Diagnosis    ASPIRIN NOT " PRESCRIBED    INTENTIONAL    0 each    1 each daily Antiplatelet medication not prescribed intentionally due to Current anticoagulant therapy (warfarin/enoxaparin)    Cerebral artery occlusion with cerebral infarction (H)       atorvastatin 40 MG tablet    LIPITOR    90 tablet    Take 1 tablet (40 mg) by mouth daily    History of stroke       cholecalciferol 5000 units Caps    D 5000    90 capsule    Take 1 capsule (5,000 Units) by mouth daily    Vitamin D deficiency       coenzyme Q-10 200 MG Caps      Take 200 mg by mouth daily        IRON SUPPLEMENT PO           warfarin 5 MG tablet    COUMADIN    90 tablet    2.5 mg (5 mg x 0.5) on Mon, Wed, Fri; 5 mg (5 mg x 1) all other days or As directed by Anticoagulation Clinic    Long term current use of anticoagulant

## 2018-12-06 ENCOUNTER — TELEPHONE (OUTPATIENT)
Dept: OBGYN | Facility: CLINIC | Age: 43
End: 2018-12-06

## 2018-12-06 NOTE — TELEPHONE ENCOUNTER
Reason for Call:  Other appointment    Detailed comments: pt calling stating she is returning call to schedule surgery. Elvia called her last week.     Phone Number Patient can be reached at: Cell number on file:    Telephone Information:   Mobile [phone]       Best Time: any     Can we leave a detailed message on this number? YES    Call taken on 12/6/2018 at 9:10 AM by Tomeka Durand

## 2018-12-07 ENCOUNTER — ANTICOAGULATION THERAPY VISIT (OUTPATIENT)
Dept: ANTICOAGULATION | Facility: CLINIC | Age: 43
End: 2018-12-07
Payer: COMMERCIAL

## 2018-12-07 DIAGNOSIS — Z79.01 LONG TERM CURRENT USE OF ANTICOAGULANT: ICD-10-CM

## 2018-12-07 DIAGNOSIS — D68.59 PRIMARY HYPERCOAGULABLE STATE (H): ICD-10-CM

## 2018-12-07 LAB — INR POINT OF CARE: 2.5 (ref 0.86–1.14)

## 2018-12-07 PROCEDURE — 36416 COLLJ CAPILLARY BLOOD SPEC: CPT

## 2018-12-07 PROCEDURE — 85610 PROTHROMBIN TIME: CPT | Mod: QW

## 2018-12-07 PROCEDURE — 99207 ZZC NO CHARGE NURSE ONLY: CPT

## 2018-12-07 NOTE — TELEPHONE ENCOUNTER
Left a message for patient to call back.    -Paradise GONZALEZ [facility]  Clinic Station

## 2018-12-07 NOTE — PROGRESS NOTES
ANTICOAGULATION FOLLOW-UP CLINIC VISIT    Patient Name:  Belkis Christopher  Date:  12/7/2018  Contact Type:  Face to Face    SUBJECTIVE:     Patient Findings     Positives Change in diet/appetite (ate two large servings of brussels sprouts while out of town recently), Missed doses (12-1-18)    Comments Pt was in ED on 11-20 for heavy uterine bleeding. She was given tranexamic acid in the ED, which helped with the bleeding. She is going to be scheduling an endometrial ablation. Pt states Dr. Brower said she can stay on her warfarin for this.    Patient was again reminded to stay consistent with vit K rich foods and to take her warfarin the next morning if she misses it the day before. INR history is labile and pt is not consistent with greens or taking her warfarin. Writer advised rechecking INR sooner than 4 weeks but pt does not want to.           OBJECTIVE    INR Protime   Date Value Ref Range Status   12/07/2018 2.5 (A) 0.86 - 1.14 Final       ASSESSMENT / PLAN  INR assessment THER    Recheck INR In: 4 WEEKS    INR Location Clinic      Anticoagulation Summary as of 12/7/2018     INR goal 2.0-3.0   Today's INR 2.5   Warfarin maintenance plan 2.5 mg (5 mg x 0.5) on Mon, Wed, Fri; 5 mg (5 mg x 1) all other days   Full warfarin instructions 2.5 mg on Mon, Wed, Fri; 5 mg all other days   Weekly warfarin total 27.5 mg   No change documented Deb Roman, RN   Plan last modified Brandy Bose RN (10/29/2018)   Next INR check 1/8/2019   Priority INR   Target end date Indefinite    Indications   Long term current use of anticoagulant [Z79.01]  Cerebral artery occlusion with cerebral infarction (H) (Resolved) [I63.50]  Primary hypercoagulable state (H) [D68.59]         Anticoagulation Episode Summary     INR check location     Preferred lab     Send INR reminders to [facility]    Comments * CVA/TIA 2000 (carotid artery), also FVL (hetero). patient frequently misses doses when out of town         Anticoagulation Care Providers     Provider Role Specialty Phone number    Adilene Shahecmacarena Markham DO [facility] [phone]            See the Encounter Report to view Anticoagulation Flowsheet and Dosing Calendar (Go to Encounters tab in chart review, and find the Anticoagulation Therapy Visit)        Deb Roman RN

## 2018-12-07 NOTE — MR AVS SNAPSHOT
Belkisbuck Christopher   12/7/2018 3:00 PM   Anticoagulation Therapy Visit    Description:  43 year old female   Provider:  [facility]   Department:  [facility]           INR as of 12/7/2018     Today's INR 2.5      Anticoagulation Summary as of 12/7/2018     INR goal 2.0-3.0   Today's INR 2.5   Full warfarin instructions 2.5 mg on Mon, Wed, Fri; 5 mg all other days   Next INR check 1/8/2019    Indications   Long term current use of anticoagulant [Z79.01]  Cerebral artery occlusion with cerebral infarction (H) (Resolved) [I63.50]  Primary hypercoagulable state (H) [D68.59]         Your next Anticoagulation Clinic appointment(s)     Jan 08, 2019  4:15 PM CST   Anticoagulation Visit with [facility]   [facility]    [address] [address] [phone]              Contact Numbers     Please call [phone] with any problems or questions regarding your therapy.    If you need to cancel and/or reschedule your appointment please call one of the following numbers:  [facility] [phone]  [place] - [phone]  [facility] [phone]  [facility] [phone]  [place] - [phone]            December 2018 Details    Sun Mon Tue Wed Thu Fri Sat           1                 2               3               4               5               6               7      2.5 mg   See details      8      5 mg           9      5 mg         10      2.5 mg         11      5 mg         12      2.5 mg         13      5 mg         14      2.5 mg         15      5 mg           16      5 mg         17      2.5 mg         18      5 mg         19      2.5 mg         20      5 mg         21      2.5 mg         22      5 mg           23      5 mg         24      2.5 mg         25      5 mg         26      2.5 mg         27      5 mg         28      2.5 mg         29      5 mg           30      5 mg         31      2.5 mg               Date Details   12/07 This INR check                How to take your warfarin dose     To take:  2.5 mg Take 0.5 of a 5 mg tablet.    To take:  5 mg Take 1 of the 5 mg tablets.           January 2019 Details    Sun Mon Tue Wed Thu Fri Sat       1      5 mg         2      2.5 mg         3      5 mg         4      2.5 mg         5      5 mg           6      5 mg         7      2.5 mg         8            9               10               11               12                 13               14               15               16               17               18               19                 20               21               22               23               24               25               26                 27               28               29               30               31                  Date Details   No additional details    Date of next INR:  1/8/2019         How to take your warfarin dose     To take:  2.5 mg Take 0.5 of a 5 mg tablet.    To take:  5 mg Take 1 of the 5 mg tablets.

## 2018-12-07 NOTE — TELEPHONE ENCOUNTER
"  You are now scheduled for surgery at [facility].  Below are the details for your surgery.  Please read the \"Preparing for Your Surgery\" instructions and let us know if you have any questions.    Type of surgery: Hysteroscopy, Endometrial sampling, endometrial ablation  Surgeon:  Adis Brower MD  Location of surgery: [facility] OR    Date of surgery: 1-28-19    Time: 9:00am   Arrival Time: 8:00am    Time can change, to be confirmed a couple of days prior by pre-op surgery nurse.    Pre-Op Appt Date: Patient to schedule with a PCP or Family Practice Provider within 30 days to the surgery.  Post-Op Appt Date: To be determined by provider     Packet sent out: Yes  Pre-cert/Authorization completed:  TBD by Financial Securing Office.   MA Sterilization/Hysterectomy Acknowledgment Consent signed: Not Applicable    [facility]  [phone]    Fax: [phone]  Same Day Surgery [phone]  Fax: [phone]    "

## 2019-01-08 ENCOUNTER — ANTICOAGULATION THERAPY VISIT (OUTPATIENT)
Dept: ANTICOAGULATION | Facility: CLINIC | Age: 44
End: 2019-01-08
Payer: COMMERCIAL

## 2019-01-08 ENCOUNTER — TELEPHONE (OUTPATIENT)
Dept: ANTICOAGULATION | Facility: CLINIC | Age: 44
End: 2019-01-08

## 2019-01-08 DIAGNOSIS — D68.59 PRIMARY HYPERCOAGULABLE STATE (H): ICD-10-CM

## 2019-01-08 DIAGNOSIS — Z79.01 LONG TERM CURRENT USE OF ANTICOAGULANT: ICD-10-CM

## 2019-01-08 LAB — INR POINT OF CARE: 3 (ref 0.86–1.14)

## 2019-01-08 PROCEDURE — 36416 COLLJ CAPILLARY BLOOD SPEC: CPT

## 2019-01-08 PROCEDURE — 99207 ZZC NO CHARGE NURSE ONLY: CPT

## 2019-01-08 PROCEDURE — 85610 PROTHROMBIN TIME: CPT | Mod: QW

## 2019-01-08 RX ORDER — WARFARIN SODIUM 5 MG/1
TABLET ORAL
Qty: 90 TABLET | Refills: 0 | Status: SHIPPED | OUTPATIENT
Start: 2019-01-08 | End: 2019-05-07

## 2019-01-08 NOTE — TELEPHONE ENCOUNTER
01/08/19    Regarding the procedure on 1/28/19, does the patient need to hold her Coumadin? Is there an INR goal that she should establish before the procedure?    Christian Cochran RN, BSN, PHN  Anticoagulation Clinic   [phone]

## 2019-01-08 NOTE — PROGRESS NOTES
01/18/19  Regarding procedure on 1/28    Karina Shah, DO  [facility]             If able to shoot for lower end of therapeutic INR.  Otherwise no need to stop warfarin or bridge      ANTICOAGULATION FOLLOW-UP CLINIC VISIT    Patient Name:  Belkis Christopher  Date:  1/8/2019  Contact Type:  Face to Face    SUBJECTIVE:     Patient Findings     Positives:   No Problem Findings    Comments:   No changes in medications, activity, or diet noted. No bleeding or increased bruising noted. Took warfarin as prescribed.  Patient will continue weekly maintenance dose. INR is therapeutic.   Recheck in 6 weeks.   Patient verbalizes understanding and agrees to plan. No further questions or concerns.             OBJECTIVE    INR Protime   Date Value Ref Range Status   01/08/2019 3.0 (A) 0.86 - 1.14 Final       ASSESSMENT / PLAN  INR assessment THER    Recheck INR In: 6 WEEKS    INR Location Clinic      Anticoagulation Summary  As of 1/8/2019    INR goal:   2.0-3.0   TTR:   63.6 % (3.5 y)   INR used for dosing:   3.0 (1/8/2019)   Warfarin maintenance plan:   2.5 mg (5 mg x 0.5) every Mon, Wed, Fri; 5 mg (5 mg x 1) all other days   Full warfarin instructions:   2.5 mg every Mon, Wed, Fri; 5 mg all other days   Weekly warfarin total:   27.5 mg   No change documented:   Christian Cochran RN   Plan last modified:   Brandy Bose RN (10/29/2018)   Next INR check:   2/19/2019   Priority:   INR   Target end date:   Indefinite    Indications    Long term current use of anticoagulant [Z79.01]  Cerebral artery occlusion with cerebral infarction (H) (Resolved) [I63.50]  Primary hypercoagulable state (H) [D68.59]             Anticoagulation Episode Summary     INR check location:       Preferred lab:       Send INR reminders to:   [facility] POOL    Comments:   * CVA/TIA 2000 (carotid artery), also FVL (hetero). patient frequently misses doses when out of town        Anticoagulation Care Providers      Provider Role Specialty Phone number    Adilene Shahecmacarena Markham DO [facility] Internal [facility] [phone]            See the Encounter Report to view Anticoagulation Flowsheet and Dosing Calendar (Go to Encounters tab in chart review, and find the Anticoagulation Therapy Visit)        Christian Cochran RN

## 2019-01-17 NOTE — PROGRESS NOTES
[facility]  [address] [address]  [phone]  Dept: [phone]    PRE-OP EVALUATION:  Today's date: 2019    Belkis Christopher (: 1975) presents for pre-operative evaluation assessment as requested by Dr. Diego.  She requires evaluation and anesthesia risk assessment prior to undergoing surgery/procedure for treatment of  Hysteroscopy, endometrial sampling, endometrial ablation. Dee. Requesting OR assistant.    Proposed Surgery/ Procedure: Hysteroscopy, endometrial sampling, endometrial ablation. Dee. Requesting OR assistant.  Date of Surgery/ Procedure: 19   Time of Surgery/ Procedure: 9:00 AM   Hospital/Surgical Facility: [facility]   Primary Physician: Karina Shah  Type of Anesthesia Anticipated: combined MAC with Local     Patient has a Health Care Directive or Living Will:  NO    1. YES - Do you have a history of heart attack, stroke, stent, bypass or surgery on an artery in the head, neck, heart or legs? Has had a stroke   2. NO - Do you ever have any pain or discomfort in your chest?  3. NO - Do you have a history of  Heart Failure?  4. NO - Are you troubled by shortness of breath when: walking on the level, up a slight hill or at night?  5. NO - Do you currently have a cold, bronchitis or other respiratory infection?  6. NO - Do you have a cough, shortness of breath or wheezing?  7. NO - Do you sometimes get pains in the calves of your legs when you walk?  8. YES - Do you or anyone in your family have previous history of blood clots? Yes   9. NO - Do you or does anyone in your family have a serious bleeding problem such as prolonged bleeding following surgeries or cuts?  10. YES- Have you ever had problems with anemia or been told to take iron pills? Has anemia sometimes, takes iron pills   11. NO - Have you had any abnormal blood loss such as black, tarry or bloody stools, or abnormal vaginal bleeding?  12. NO - Have you ever had a  blood transfusion?  13. NO - Have you or any of your relatives ever had problems with anesthesia?  14. NO - Do you have sleep apnea, excessive snoring or daytime drowsiness?  15. NO - Do you have any prosthetic heart valves?  16. NO - Do you have prosthetic joints?  17. NO - Is there any chance that you may be pregnant?      HPI:     HPI related to upcoming procedure: Heavy periods, failing medical management, requiring ablation      She has a history of stroke in 2000 with angiodysplasia of the left carotid artery, resulting in aphasia and right hand symptoms.  She has primary hypercoagulable state with cardiolipin antibody and heterozygous for factor V Leiden.  Has been on warfarin since then.  She has only had wisdom teeth removed, no other major surgery since being on warfarin.   Her OB/GYN physician stated she can continue warfarin as planned, see telephone note dated 1/8/19    Hyperlipidemia: On statin    See problem list for active medical problems.  Problems all longstanding and stable, except as noted/documented.  See ROS for pertinent symptoms related to these conditions.                                                                                                                                                            MEDICAL HISTORY:     Patient Active Problem List    Diagnosis Date Noted     Menorrhagia with regular cycle 01/18/2019     Priority: Medium     Long term current use of anticoagulant 06/07/2011     Priority: Medium     Vitamin D deficiency 03/27/2011     Priority: Medium     On supplement.        Subclinical hypothyroidism 07/28/2010     Priority: Medium     History of hypothyroid, was previously on levothyroxine 75 mcg (1/2 pill) in the past.  Has not been on this since at least 2014.  TSH in the past borderline high (2.77 in 2005, 5.5 in 2009).  T4 has always been normal.  She reports when her thyroid test has been abnormal it was during times when her period was abnormal.  She has  not noted any worsening of problems since being off the thyroid medication.  Reports symptoms of hypothyroid - fatigue, heavy periods       Malaise and fatigue 07/09/2008     Priority: Medium     Hyperlipidemia LDL goal <70 06/28/2007     Priority: Medium     Lipid panel in 2009 Total Chol 250, HDL 52, , Trig 118       History of stroke 06/27/2007     Priority: Medium     Angiodysplasia of left carotid, aphasia.  Right hand symptoms.          Primary hypercoagulable state (H) 06/25/2007     Priority: Medium     Combo of cardiolipin antibody and heterzygote for Factor V Leiden.  Factor II mutation was negative.    Found in 2000 after she had a stroke.  She had injury to her carotid artery - sounds like maybe a dissection?  She was also on OCP at that time as well, thought to contribute to the stroke.  Has been on warfarin ever since        Past Medical History:   Diagnosis Date     Factor 5 Leiden mutation, heterozygous (H)      High cholesterol      TIA (transient ischemic attack)     20years ago     History reviewed. No pertinent surgical history.  Current Outpatient Medications   Medication Sig Dispense Refill     atorvastatin (LIPITOR) 40 MG tablet Take 1 tablet (40 mg) by mouth daily 90 tablet 3     cholecalciferol (D 5000) 5000 units CAPS Take 1 capsule (5,000 Units) by mouth daily 90 capsule 3     coenzyme Q-10 200 MG CAPS Take 200 mg by mouth daily       Ferrous Sulfate (IRON SUPPLEMENT PO)        warfarin (COUMADIN) 5 MG tablet 2.5 mg (5 mg x 0.5) on Mon, Wed, Fri; 5 mg (5 mg x 1) all other days or As directed by Anticoagulation Clinic 90 tablet 0     ASPIRIN NOT PRESCRIBED, INTENTIONAL, 1 each daily Antiplatelet medication not prescribed intentionally due to Current anticoagulant therapy (warfarin/enoxaparin) 0 each 0     OTC products: none    No Known Allergies   Latex Allergy: NO    Social History     Tobacco Use     Smoking status: Never Smoker     Smokeless tobacco: Never Used   Substance Use  "Topics     Alcohol use: Yes     Comment: occ.     History   Drug Use No       REVIEW OF SYSTEMS:   Constitutional, neuro, ENT, endocrine, pulmonary, cardiac, gastrointestinal, genitourinary, musculoskeletal, integument and psychiatric systems are negative, except as otherwise noted.    EXAM:   /70 (BP Location: Right arm, Patient Position: Sitting, Cuff Size: Adult Large)   Pulse 87   Temp 98.1  F (36.7  C) (Tympanic)   Ht 1.676 m (5' 6\")   Wt 93.9 kg (207 lb 1.6 oz)   SpO2 98%   BMI 33.43 kg/m      GENERAL APPEARANCE: healthy, alert and no distress     EYES: EOMI, PERRL     HENT: ear canals and TM's normal and nose and mouth without ulcers or lesions     NECK: no adenopathy, no asymmetry, masses, or scars and thyroid normal to palpation     RESP: lungs clear to auscultation - no rales, rhonchi or wheezes     CV: regular rates and rhythm, normal S1 S2, no S3 or S4 and no murmur, click or rub     ABDOMEN:  soft, nontender, no HSM or masses and bowel sounds normal     MS: extremities normal- no gross deformities noted, no evidence of inflammation in joints, FROM in all extremities.     SKIN: no suspicious lesions or rashes     NEURO: Normal strength and tone, sensory exam grossly normal, mentation intact and speech normal     PSYCH: mentation appears normal. and affect normal/bright     LYMPHATICS: No cervical adenopathy    DIAGNOSTICS:   No labs or EKG required for low risk surgery     Recent Labs   Lab Test 01/08/19 12/07/18 11/20/18  2233  05/17/18  1437 05/02/18  1621   HGB  --   --  13.0  --  12.9 13.3   PLT  --   --  263  --  274 321   INR 3.0* 2.5* 1.38*   < >  --   --    NA  --   --  138  --   --  141   POTASSIUM  --   --  3.6  --   --  3.7   CR  --   --  0.75  --   --  0.79    < > = values in this interval not displayed.        IMPRESSION:   Reason for surgery/procedure: hysteroscopy, ablation  Diagnosis/reason for consult: heavy bleeding    The proposed surgical procedure is considered LOW " risk.    REVISED CARDIAC RISK INDEX  The patient has the following serious cardiovascular risks for perioperative complications such as (MI, PE, VFib and 3  AV Block):  Cerebrovascular Disease (TIA or CVA)  INTERPRETATION: 1 risks: Class II (low risk - 0.9% complication rate)    The patient has the following additional risks for perioperative complications:  Hypercoagulable state on warfarin      ICD-10-CM    1. Preop general physical exam Z01.818    2. Menorrhagia with regular cycle N92.0    3. History of stroke Z86.73    4. Hyperlipidemia LDL goal <70 E78.5    5. Subclinical hypothyroidism E03.9    6. Primary hypercoagulable state (H) D68.59        RECOMMENDATIONS:         --Patient is to take all scheduled medications on the day of surgery     Anticoagulant or Antiplatelet Medication Use  WARFARIN: Bleeding risk is minimal for this procedure (e.g. cataract, 1 to 2 dental extractions) and warfarin should be continued before procedure.        APPROVAL GIVEN to proceed with proposed procedure, without further diagnostic evaluation       Signed Electronically by: Karina Shah DO    Copy of this evaluation report is provided to requesting physician.    [facility] Preop Guidelines    Revised Cardiac Risk Index

## 2019-01-17 NOTE — PATIENT INSTRUCTIONS
Before Your Surgery      Call your surgeon if there is any change in your health. This includes signs of a cold or flu (such as a sore throat, runny nose, cough, rash or fever).    Do not smoke, drink alcohol or take over the counter medicine (unless your surgeon or primary care doctor tells you to) for the 24 hours before and after surgery.    If you take prescribed drugs: Follow your doctor s orders about which medicines to take and which to stop until after surgery.    Eating and drinking prior to surgery: follow the instructions from your surgeon    Take a shower or bath the night before surgery. Use the soap your surgeon gave you to gently clean your skin. If you do not have soap from your surgeon, use your regular soap. Do not shave or scrub the surgery site.  Wear clean pajamas and have clean sheets on your bed.       1. Continue your medications including warfarin, unchanged up through surgery.  Ideally err on the lower side of goal INR if able

## 2019-01-18 ENCOUNTER — OFFICE VISIT (OUTPATIENT)
Dept: FAMILY MEDICINE | Facility: CLINIC | Age: 44
End: 2019-01-18
Payer: COMMERCIAL

## 2019-01-18 VITALS
SYSTOLIC BLOOD PRESSURE: 116 MMHG | DIASTOLIC BLOOD PRESSURE: 70 MMHG | BODY MASS INDEX: 33.28 KG/M2 | WEIGHT: 207.1 LBS | OXYGEN SATURATION: 98 % | TEMPERATURE: 98.1 F | HEART RATE: 87 BPM | HEIGHT: 66 IN

## 2019-01-18 DIAGNOSIS — E03.8 SUBCLINICAL HYPOTHYROIDISM: ICD-10-CM

## 2019-01-18 DIAGNOSIS — Z86.73 HISTORY OF STROKE: ICD-10-CM

## 2019-01-18 DIAGNOSIS — Z01.818 PREOP GENERAL PHYSICAL EXAM: Primary | ICD-10-CM

## 2019-01-18 DIAGNOSIS — N92.0 MENORRHAGIA WITH REGULAR CYCLE: ICD-10-CM

## 2019-01-18 DIAGNOSIS — E78.5 HYPERLIPIDEMIA LDL GOAL <70: ICD-10-CM

## 2019-01-18 DIAGNOSIS — D68.59 PRIMARY HYPERCOAGULABLE STATE (H): ICD-10-CM

## 2019-01-18 PROCEDURE — 99214 OFFICE O/P EST MOD 30 MIN: CPT | Performed by: INTERNAL MEDICINE

## 2019-01-18 ASSESSMENT — MIFFLIN-ST. JEOR: SCORE: 1611.15

## 2019-01-25 ENCOUNTER — ANESTHESIA EVENT (OUTPATIENT)
Dept: SURGERY | Facility: CLINIC | Age: 44
End: 2019-01-25
Payer: COMMERCIAL

## 2019-01-28 ENCOUNTER — SURGERY (OUTPATIENT)
Age: 44
End: 2019-01-28
Payer: COMMERCIAL

## 2019-01-28 ENCOUNTER — HOSPITAL ENCOUNTER (OUTPATIENT)
Facility: CLINIC | Age: 44
Discharge: HOME | End: 2019-01-28
Attending: OBSTETRICS & GYNECOLOGY | Admitting: OBSTETRICS & GYNECOLOGY
Payer: COMMERCIAL

## 2019-01-28 ENCOUNTER — ANESTHESIA (OUTPATIENT)
Dept: SURGERY | Facility: CLINIC | Age: 44
End: 2019-01-28
Payer: COMMERCIAL

## 2019-01-28 VITALS
TEMPERATURE: 97.1 F | HEIGHT: 66 IN | BODY MASS INDEX: 33.27 KG/M2 | RESPIRATION RATE: 16 BRPM | DIASTOLIC BLOOD PRESSURE: 74 MMHG | HEART RATE: 71 BPM | OXYGEN SATURATION: 93 % | WEIGHT: 207 LBS | SYSTOLIC BLOOD PRESSURE: 118 MMHG

## 2019-01-28 DIAGNOSIS — Z98.890 S/P ENDOMETRIAL ABLATION: Primary | ICD-10-CM

## 2019-01-28 LAB — HCG UR QL: NEGATIVE

## 2019-01-28 PROCEDURE — 25000125 ZZHC RX 250: Performed by: NURSE ANESTHETIST, CERTIFIED REGISTERED

## 2019-01-28 PROCEDURE — 25000128 H RX IP 250 OP 636: Performed by: NURSE ANESTHETIST, CERTIFIED REGISTERED

## 2019-01-28 PROCEDURE — 58563 HYSTEROSCOPY ABLATION: CPT | Performed by: OBSTETRICS & GYNECOLOGY

## 2019-01-28 PROCEDURE — 36000056 ZZH SURGERY LEVEL 3 1ST 30 MIN: Performed by: OBSTETRICS & GYNECOLOGY

## 2019-01-28 PROCEDURE — 81025 URINE PREGNANCY TEST: CPT | Performed by: NURSE ANESTHETIST, CERTIFIED REGISTERED

## 2019-01-28 PROCEDURE — 25000128 H RX IP 250 OP 636: Performed by: OBSTETRICS & GYNECOLOGY

## 2019-01-28 PROCEDURE — 36000058 ZZH SURGERY LEVEL 3 EA 15 ADDTL MIN: Performed by: OBSTETRICS & GYNECOLOGY

## 2019-01-28 PROCEDURE — 88305 TISSUE EXAM BY PATHOLOGIST: CPT | Mod: 26 | Performed by: OBSTETRICS & GYNECOLOGY

## 2019-01-28 PROCEDURE — 88305 TISSUE EXAM BY PATHOLOGIST: CPT | Performed by: OBSTETRICS & GYNECOLOGY

## 2019-01-28 PROCEDURE — 37000009 ZZH ANESTHESIA TECHNICAL FEE, EACH ADDTL 15 MIN: Performed by: OBSTETRICS & GYNECOLOGY

## 2019-01-28 PROCEDURE — 37000008 ZZH ANESTHESIA TECHNICAL FEE, 1ST 30 MIN: Performed by: OBSTETRICS & GYNECOLOGY

## 2019-01-28 PROCEDURE — C1888 ENDOVAS NON-CARDIAC ABL CATH: HCPCS | Performed by: OBSTETRICS & GYNECOLOGY

## 2019-01-28 PROCEDURE — 27110028 ZZH OR GENERAL SUPPLY NON-STERILE: Performed by: OBSTETRICS & GYNECOLOGY

## 2019-01-28 PROCEDURE — 40000306 ZZH STATISTIC PRE PROC ASSESS II: Performed by: OBSTETRICS & GYNECOLOGY

## 2019-01-28 PROCEDURE — 71000027 ZZH RECOVERY PHASE 2 EACH 15 MINS: Performed by: OBSTETRICS & GYNECOLOGY

## 2019-01-28 PROCEDURE — 27210794 ZZH OR GENERAL SUPPLY STERILE: Performed by: OBSTETRICS & GYNECOLOGY

## 2019-01-28 RX ORDER — PROPOFOL 10 MG/ML
INJECTION, EMULSION INTRAVENOUS CONTINUOUS PRN
Status: DISCONTINUED | OUTPATIENT
Start: 2019-01-28 | End: 2019-01-28

## 2019-01-28 RX ORDER — FENTANYL CITRATE 50 UG/ML
INJECTION, SOLUTION INTRAMUSCULAR; INTRAVENOUS PRN
Status: DISCONTINUED | OUTPATIENT
Start: 2019-01-28 | End: 2019-01-28

## 2019-01-28 RX ORDER — PROPOFOL 10 MG/ML
INJECTION, EMULSION INTRAVENOUS PRN
Status: DISCONTINUED | OUTPATIENT
Start: 2019-01-28 | End: 2019-01-28

## 2019-01-28 RX ORDER — OXYCODONE HYDROCHLORIDE 5 MG/1
5-10 TABLET ORAL EVERY 4 HOURS PRN
Qty: 10 TABLET | Refills: 0 | Status: SHIPPED | OUTPATIENT
Start: 2019-01-28 | End: 2019-01-31

## 2019-01-28 RX ORDER — HYDROXYZINE PAMOATE 50 MG/1
50 CAPSULE ORAL 3 TIMES DAILY PRN
Qty: 6 CAPSULE | Refills: 0 | Status: SHIPPED | OUTPATIENT
Start: 2019-01-28 | End: 2019-02-07

## 2019-01-28 RX ORDER — SODIUM CHLORIDE, SODIUM LACTATE, POTASSIUM CHLORIDE, CALCIUM CHLORIDE 600; 310; 30; 20 MG/100ML; MG/100ML; MG/100ML; MG/100ML
INJECTION, SOLUTION INTRAVENOUS CONTINUOUS
Status: DISCONTINUED | OUTPATIENT
Start: 2019-01-28 | End: 2019-01-28 | Stop reason: HOSPADM

## 2019-01-28 RX ORDER — ONDANSETRON 2 MG/ML
INJECTION INTRAMUSCULAR; INTRAVENOUS PRN
Status: DISCONTINUED | OUTPATIENT
Start: 2019-01-28 | End: 2019-01-28

## 2019-01-28 RX ORDER — OXYCODONE HYDROCHLORIDE 5 MG/1
5 TABLET ORAL
Status: DISCONTINUED | OUTPATIENT
Start: 2019-01-28 | End: 2019-01-28 | Stop reason: HOSPADM

## 2019-01-28 RX ORDER — HYDROXYZINE HYDROCHLORIDE 25 MG/1
25 TABLET, FILM COATED ORAL
Status: DISCONTINUED | OUTPATIENT
Start: 2019-01-28 | End: 2019-01-28 | Stop reason: HOSPADM

## 2019-01-28 RX ORDER — LIDOCAINE 40 MG/G
CREAM TOPICAL
Status: DISCONTINUED | OUTPATIENT
Start: 2019-01-28 | End: 2019-01-28 | Stop reason: HOSPADM

## 2019-01-28 RX ORDER — LIDOCAINE HYDROCHLORIDE 10 MG/ML
INJECTION, SOLUTION INFILTRATION; PERINEURAL PRN
Status: DISCONTINUED | OUTPATIENT
Start: 2019-01-28 | End: 2019-01-28

## 2019-01-28 RX ORDER — KETOROLAC TROMETHAMINE 30 MG/ML
30 INJECTION, SOLUTION INTRAMUSCULAR; INTRAVENOUS ONCE
Status: COMPLETED | OUTPATIENT
Start: 2019-01-28 | End: 2019-01-28

## 2019-01-28 RX ORDER — BUPIVACAINE HYDROCHLORIDE 5 MG/ML
INJECTION, SOLUTION PERINEURAL PRN
Status: DISCONTINUED | OUTPATIENT
Start: 2019-01-28 | End: 2019-01-28 | Stop reason: HOSPADM

## 2019-01-28 RX ORDER — DEXAMETHASONE SODIUM PHOSPHATE 4 MG/ML
INJECTION, SOLUTION INTRA-ARTICULAR; INTRALESIONAL; INTRAMUSCULAR; INTRAVENOUS; SOFT TISSUE PRN
Status: DISCONTINUED | OUTPATIENT
Start: 2019-01-28 | End: 2019-01-28

## 2019-01-28 RX ADMIN — FENTANYL CITRATE 50 MCG: 50 INJECTION, SOLUTION INTRAMUSCULAR; INTRAVENOUS at 14:53

## 2019-01-28 RX ADMIN — PROPOFOL 150 MCG/KG/MIN: 10 INJECTION, EMULSION INTRAVENOUS at 14:37

## 2019-01-28 RX ADMIN — LIDOCAINE HYDROCHLORIDE 1 ML: 10 INJECTION, SOLUTION EPIDURAL; INFILTRATION; INTRACAUDAL; PERINEURAL at 13:31

## 2019-01-28 RX ADMIN — BUPIVACAINE HYDROCHLORIDE 7 ML: 5 INJECTION, SOLUTION PERINEURAL at 15:12

## 2019-01-28 RX ADMIN — DEXAMETHASONE SODIUM PHOSPHATE 8 MG: 4 INJECTION, SOLUTION INTRA-ARTICULAR; INTRALESIONAL; INTRAMUSCULAR; INTRAVENOUS; SOFT TISSUE at 14:43

## 2019-01-28 RX ADMIN — SODIUM CHLORIDE, POTASSIUM CHLORIDE, SODIUM LACTATE AND CALCIUM CHLORIDE: 600; 310; 30; 20 INJECTION, SOLUTION INTRAVENOUS at 14:33

## 2019-01-28 RX ADMIN — SODIUM CHLORIDE, POTASSIUM CHLORIDE, SODIUM LACTATE AND CALCIUM CHLORIDE: 600; 310; 30; 20 INJECTION, SOLUTION INTRAVENOUS at 13:31

## 2019-01-28 RX ADMIN — FENTANYL CITRATE 50 MCG: 50 INJECTION, SOLUTION INTRAMUSCULAR; INTRAVENOUS at 14:37

## 2019-01-28 RX ADMIN — ONDANSETRON 4 MG: 2 INJECTION INTRAMUSCULAR; INTRAVENOUS at 14:43

## 2019-01-28 RX ADMIN — LIDOCAINE HYDROCHLORIDE 50 MG: 10 INJECTION, SOLUTION INFILTRATION; PERINEURAL at 14:37

## 2019-01-28 RX ADMIN — KETOROLAC TROMETHAMINE 30 MG: 30 INJECTION, SOLUTION INTRAMUSCULAR at 13:31

## 2019-01-28 RX ADMIN — MIDAZOLAM HYDROCHLORIDE 5 MG: 1 INJECTION, SOLUTION INTRAMUSCULAR; INTRAVENOUS at 14:33

## 2019-01-28 RX ADMIN — PROPOFOL 50 MG: 10 INJECTION, EMULSION INTRAVENOUS at 14:37

## 2019-01-28 ASSESSMENT — MIFFLIN-ST. JEOR: SCORE: 1610.7

## 2019-01-28 NOTE — ANESTHESIA PREPROCEDURE EVALUATION
Anesthesia Pre-Procedure Evaluation    Patient: Belkis Christopher   MRN: [id number] : 1975          Preoperative Diagnosis: Menorrhagia    Procedure(s):  Hysteroscopy, endometrial sampling, endometrial ablation. Dee.    Past Medical History:   Diagnosis Date     Factor 5 Leiden mutation, heterozygous (H)      High cholesterol      Obese      TIA (transient ischemic attack)     20years ago     History reviewed. No pertinent surgical history.    Anesthesia Evaluation     . Pt has had prior anesthetic. Type: MAC           ROS/MED HX    ENT/Pulmonary:  - neg pulmonary ROS     Neurologic:     (+)TIA     Cardiovascular:     (+) ----. Taking blood thinners : . . . :. .       METS/Exercise Tolerance:     Hematologic:     (+) History of blood clots Other Hematologic Disorder-leiden factor V      Musculoskeletal:         GI/Hepatic:  - neg GI/hepatic ROS       Renal/Genitourinary:  - ROS Renal section negative       Endo:     (+) Obesity, .      Psychiatric:  - neg psychiatric ROS       Infectious Disease:         Malignancy:      - no malignancy   Other:    - neg other ROS                      Physical Exam  Normal systems: cardiovascular, pulmonary and dental    Airway   Mallampati: II  TM distance: >3 FB  Neck ROM: full    Dental     Cardiovascular       Pulmonary             Lab Results   Component Value Date    WBC 11.5 (H) 2018    HGB 13.0 2018    HCT 38.1 2018     2018    CRP 12.5 (H) 2018    SED 27 (H) 2018     2018    POTASSIUM 3.6 2018    CHLORIDE 104 2018    CO2 25 2018    BUN 17 2018    CR 0.75 2018    GLC 99 2018    MARY 7.9 (L) 2018    INR 3.0 (A) 2019    TSH 4.61 (H) 2018    T4 0.90 2018    HCG Negative 2019       Preop Vitals  BP Readings from Last 3 Encounters:   19 133/77   19 116/70   18 121/77    Pulse Readings from Last 3 Encounters:   19 94   19 87  "  11/27/18 77      Resp Readings from Last 3 Encounters:   01/28/19 16   11/27/18 18   11/20/18 18    SpO2 Readings from Last 3 Encounters:   01/28/19 99%   01/18/19 98%   11/20/18 98%      Temp Readings from Last 1 Encounters:   01/28/19 37.1  C (98.8  F) (Oral)    Ht Readings from Last 1 Encounters:   01/28/19 1.676 m (5' 6\")      Wt Readings from Last 1 Encounters:   01/28/19 93.9 kg (207 lb)    Estimated body mass index is 33.41 kg/m  as calculated from the following:    Height as of this encounter: 1.676 m (5' 6\").    Weight as of this encounter: 93.9 kg (207 lb).       Anesthesia Plan      History & Physical Review  History and physical reviewed and following examination; no interval change.    ASA Status:  2 .    NPO Status:  > 8 hours    Plan for MAC with Intravenous and Propofol induction.   PONV prophylaxis:  Ondansetron (or other 5HT-3) and Dexamethasone or Solumedrol       Postoperative Care  Postoperative pain management:  IV analgesics and Oral pain medications.      Consents  Anesthetic plan, risks, benefits and alternatives discussed with:  Patient..                 WESLY Farmer CRNA  "

## 2019-01-28 NOTE — ANESTHESIA POSTPROCEDURE EVALUATION
Patient: Belkis Christopher    Procedure(s):  Hysteroscopy, endometrial sampling, endometrial ablation. Dee.    Diagnosis:Menorrhagia  Diagnosis Additional Information: No value filed.    Anesthesia Type:  MAC    Note:  Anesthesia Post Evaluation    Patient location during evaluation: Phase 2  Patient participation: Able to fully participate in evaluation  Level of consciousness: awake and responsive to verbal stimuli  Pain management: adequate  Airway patency: patent  Cardiovascular status: acceptable  Respiratory status: acceptable  Hydration status: acceptable  PONV: none             Last vitals:  Vitals:    01/28/19 1314 01/28/19 1517   BP: 133/77 92/47   Pulse: 94 87   Resp: 16 16   Temp: 37.1  C (98.8  F)    SpO2: 99%          Electronically Signed By: WESLY Farmer CRNA  January 28, 2019  3:18 PM

## 2019-01-28 NOTE — DISCHARGE INSTRUCTIONS
Same Day Surgery Discharge Instructions  Special Precautions After Surgery - Adult    1. It is not unusual to feel lightheaded or faint, up to 24 hours after surgery or while taking pain medication.  If you have these symptoms; sit for a few minutes before standing and have someone assist you when getting up.  2. You should rest and relax for the next 24 hours and must have someone stay with you for at least 24 hours after your discharge.  3. DO NOT DRIVE any vehicle or operate mechanical equipment for 24 hours following the end of your surgery.  DO NOT DRIVE while taking narcotic pain medications that have been prescribed by your physician.  If you had a limb operated on, you must be able to use it fully to drive.  4. DO NOT drink alcoholic beverages for 24 hours following surgery or while taking prescription pain medication.  5. Drink clear liquids (apple juice, ginger ale, broth, 7-Up, etc.).  Progress to your regular diet as you feel able.  6. Any questions call your physician and do not make important decisions for 24 hours.    ACTIVITY  ? Up as tolerated with rest periods as needed.     Call for an appointment to return to the clinic.    Medications:  ? May use Ibuprofen or Naprosyn per bottle instructions, too.     Additional discharge instructions: Warm pack to lower back or abdomen for cramps.  __________________________________________________________________________________________________________________________________  IMPORTANT NUMBERS:    [facility] Main Number:  [phone], [phone]  Pharmacy:  [phone]  Same Day Surgery:  [phone], Monday - Friday until 8:30 p.m.  Urgent Care:  [phone]  Emergency Room:  [phone]      [facility]:  [phone]                                                                             [facility]:  [phone] option 1  [facility]:  [phone]     OB Clinic:  [phone]   Surgery  Specialty Clinic:  [phone]   Home Medical Equipment: [phone]  [facility]:  [phone]

## 2019-01-28 NOTE — ANESTHESIA POSTPROCEDURE EVALUATION
Patient: Belkis Christopher    Procedure(s):  Hysteroscopy, endometrial sampling, endometrial ablation. Dee.    Diagnosis:Menorrhagia  Diagnosis Additional Information: No value filed.    Anesthesia Type:  MAC    Note:  Anesthesia Post Evaluation    Patient location during evaluation: Bedside  Level of consciousness: awake  Pain management: adequate  Airway patency: patent  Cardiovascular status: acceptable  Respiratory status: acceptable  Hydration status: stable  PONV: none     Anesthetic complications: None          Last vitals:  Vitals:    01/28/19 1534 01/28/19 1545 01/28/19 1600   BP: 101/62 102/62 118/74   Pulse: 79 75 71   Resp: 16 16 16   Temp:      SpO2: 94% 94% 93%         Electronically Signed By: WESLY Loera CRNA  January 28, 2019  4:55 PM

## 2019-01-28 NOTE — OP NOTE
[facility] Gynecology Brief Operative Note    Pre-operative diagnosis: Menorrhagia  Chronic anti-coagulation   Post-operative diagnosis: Same   Procedure: Dilation and curettage  Endometrial ablation  Hysteroscopy   Surgeon: Adis Brower MD   Assistant(s): None   Anesthesia: MAC (monitored anesthesia care) and Paracervical block:  .5% Buvicaine   Estimated blood loss: 10 ml   Total IV fluids: (See anesthesia record)  700ml   Blood transfusion: No transfusion was given during surgery   Total urine output: Not measured   Drains: None   Specimens: Endometrial curettings   Findings: Uterus sounded to 9.5  Endocervix measured 4 cm   Thickened endometrium noted  No polyps or fibroids seen    Complications: None   Condition: Stable   Comments: Belkis Christopher   1975  [id number]      Belkis Christopher  presented for the above procedure.  She has menorrhagia, is s/p chronic anticoagulant therapy  I met with Belkis  and her , Ajith   and discussed the planned procedure as well as the expected post operative course.  Risks of complications were noted and postoperative signs to watch for outlined.  Questions were answered and consent signed.  She was taken to the OR [facility] where she was placed in the supine position. She underwent monitored anesthesia care with IV sedation..  She was then placed in the Dorso-lithotomy position in [facility].  An examination under anesthesia was performed that showed: anteverted multiparous uterus    She was prepped and draped.  A timeout was held confirming her identity and proposed procedures. All were in agreement.     Speculum was placed in the vagina and the cervix was isolated.  A paracervical block was placed with 0.5% bupivacaine.  A tenaculum was placed at 12:00.  She was easily serially dilated to #8 Hegar dilator.  Her uterus sounded to 9.5 cm.    Hysteroscopy was performed showing a thickened endometrium.  No fibroids or polyps were noted.  She  underwent sharp curettage productive of a moderate amount of tissue.  This was performed to the walls were palpably clear.  The endocervical canal was measured at 4 cm.    The Dee endometrial ablation system was then used the 5.5 cm endometrial length.  The Dee device was placed placement confirmed she underwent ablation for 2 minutes after which the Dee was removed repeat hysteroscopy showed excellent ablation and no significant bleeding.  All instruments were removed.  Sponge needle counts were correct.  Tenaculum was removed and hemostasis assured.  Estimated blood loss was 10 cc.  IV fluids 700 cc.  Patient was awakened taken to the PACU in good condition.    Adis Brower

## 2019-01-30 LAB — COPATH REPORT: NORMAL

## 2019-02-26 ENCOUNTER — ANTICOAGULATION THERAPY VISIT (OUTPATIENT)
Dept: ANTICOAGULATION | Facility: CLINIC | Age: 44
End: 2019-02-26
Payer: COMMERCIAL

## 2019-02-26 DIAGNOSIS — D68.59 PRIMARY HYPERCOAGULABLE STATE (H): ICD-10-CM

## 2019-02-26 DIAGNOSIS — Z79.01 LONG TERM CURRENT USE OF ANTICOAGULANT: ICD-10-CM

## 2019-02-26 LAB — INR POINT OF CARE: 1.9 (ref 0.86–1.14)

## 2019-02-26 PROCEDURE — 85610 PROTHROMBIN TIME: CPT | Mod: QW

## 2019-02-26 PROCEDURE — 99207 ZZC NO CHARGE NURSE ONLY: CPT

## 2019-02-26 PROCEDURE — 36416 COLLJ CAPILLARY BLOOD SPEC: CPT

## 2019-02-26 NOTE — PROGRESS NOTES
ANTICOAGULATION FOLLOW-UP CLINIC VISIT    Patient Name:  Belkis Christopher  Date:  2019  Contact Type:  Face to Face    SUBJECTIVE:     Patient Findings     Positives:   No Problem Findings    Comments:   No changes in medications, activity, health, or diet noted. No bleeding or increased bruising noted. Took warfarin as prescribed.  Patient will continue weekly maintenance dose. INR is therapeutic.   Recheck in 6 weeks.   Patient verbalizes understanding and agrees to plan. No further questions or concerns.             OBJECTIVE    INR Protime   Date Value Ref Range Status   2019 1.9 (A) 0.86 - 1.14 Final       ASSESSMENT / PLAN  INR assessment THER    Recheck INR In: 6 WEEKS    INR Location Clinic      Anticoagulation Summary  As of 2019    INR goal:   2.0-3.0   TTR:   64.6 % (3.7 y)   INR used for dosin.9! (2019)   Warfarin maintenance plan:   2.5 mg (5 mg x 0.5) every Mon, Wed, Fri; 5 mg (5 mg x 1) all other days   Full warfarin instructions:   2.5 mg every Mon, Wed, Fri; 5 mg all other days   Weekly warfarin total:   27.5 mg   No change documented:   Christian Cochran, RN   Plan last modified:   Brandy Bose RN (10/29/2018)   Next INR check:   2019   Priority:   INR   Target end date:   Indefinite    Indications    Long term current use of anticoagulant [Z79.01]  Cerebral artery occlusion with cerebral infarction (H) (Resolved) [I63.50]  Primary hypercoagulable state (H) [D68.59]             Anticoagulation Episode Summary     INR check location:       Preferred lab:       Send INR reminders to:   [facility]    Comments:   * CVA/TIA  (carotid artery), also FVL (hetero). patient frequently misses doses when out of town        Anticoagulation Care Providers     Provider Role Specialty Phone number    Karina Shah,  [facility] Internal Medicine [phone]            See the Encounter Report to view Anticoagulation Flowsheet and Dosing Calendar  (Go to Encounters tab in chart review, and find the Anticoagulation Therapy Visit)        Christian Cochran RN

## 2019-03-05 ENCOUNTER — OFFICE VISIT (OUTPATIENT)
Dept: FAMILY MEDICINE | Facility: CLINIC | Age: 44
End: 2019-03-05
Payer: COMMERCIAL

## 2019-03-05 VITALS
HEART RATE: 83 BPM | DIASTOLIC BLOOD PRESSURE: 72 MMHG | BODY MASS INDEX: 33.57 KG/M2 | TEMPERATURE: 98.2 F | OXYGEN SATURATION: 97 % | WEIGHT: 208 LBS | RESPIRATION RATE: 12 BRPM | SYSTOLIC BLOOD PRESSURE: 116 MMHG

## 2019-03-05 DIAGNOSIS — N92.0 MENORRHAGIA WITH REGULAR CYCLE: Primary | ICD-10-CM

## 2019-03-05 DIAGNOSIS — Z86.73 HISTORY OF STROKE: ICD-10-CM

## 2019-03-05 DIAGNOSIS — E78.5 HYPERLIPIDEMIA LDL GOAL <70: ICD-10-CM

## 2019-03-05 DIAGNOSIS — Z12.31 VISIT FOR SCREENING MAMMOGRAM: ICD-10-CM

## 2019-03-05 PROCEDURE — 99213 OFFICE O/P EST LOW 20 MIN: CPT | Performed by: INTERNAL MEDICINE

## 2019-03-05 RX ORDER — ATORVASTATIN CALCIUM 40 MG/1
40 TABLET, FILM COATED ORAL DAILY
Qty: 90 TABLET | Refills: 3 | Status: SHIPPED | OUTPATIENT
Start: 2019-03-05 | End: 2020-05-18

## 2019-03-05 NOTE — PROGRESS NOTES
SUBJECTIVE:   Belkis Christopher is a 43 year old female who presents to clinic today for the following health issues:          Hospital Follow-up Visit:    Hospital/Nursing Home/IP Rehab Facility: [facility]  Date of Admission: 1/28/19  Date of Discharge: same day  Reason(s) for Admission: Menorrhagia with regular cycle            Problems taking medications regularly:  None       Medication changes since discharge: None       Problems adhering to non-medication therapy:  None    Summary of hospitalization:  [facility] discharge summary reviewed  Diagnostic Tests/Treatments reviewed.  Follow up needed: Gyn  Other Healthcare Providers Involved in Patient s Care:         None  Update since discharge: improved.     Post Discharge Medication Reconciliation: discharge medications reconciled and changed, per note/orders (see AVS).  Plan of care communicated with patient     Coding guidelines for this visit:  Type of Medical   Decision Making Face-to-Face Visit       within 7 Days of discharge Face-to-Face Visit        within 14 days of discharge   Moderate Complexity [id number]   High Complexity 85605 [id number]          She is having light brown/yellow vaginal discharge since the D&C and ablation.   This has improved with time.  It is occurring every few days, is lessening in quantity and frequency.  No systemic signs.   No vaginal itching, odor, pain with intercourse, dysuria, urinary frequency.  Needing a panty liner.        Current Outpatient Medications   Medication Sig Dispense Refill     ASPIRIN NOT PRESCRIBED, INTENTIONAL, 1 each daily Antiplatelet medication not prescribed intentionally due to Current anticoagulant therapy (warfarin/enoxaparin) 0 each 0     atorvastatin (LIPITOR) 40 MG tablet Take 1 tablet (40 mg) by mouth daily 90 tablet 3     cholecalciferol (D 5000) 5000 units CAPS Take 1 capsule (5,000 Units) by mouth daily 90 capsule 3     coenzyme Q-10 200 MG CAPS Take 200 mg by mouth daily        Ferrous Sulfate (IRON SUPPLEMENT PO)        warfarin (COUMADIN) 5 MG tablet 2.5 mg (5 mg x 0.5) on Mon, Wed, Fri; 5 mg (5 mg x 1) all other days or As directed by Anticoagulation Clinic 90 tablet 0       Reviewed and updated as needed this visit by clinical staff  Tobacco  Allergies  Meds  Med Hx  Surg Hx  Fam Hx  Soc Hx      Reviewed and updated as needed this visit by Provider         ROS:  Constitutional, HEENT, cardiovascular, pulmonary, GI, , musculoskeletal, neuro, skin, endocrine and psych systems are negative, except as otherwise noted.    OBJECTIVE:     /72   Pulse 83   Temp 98.2  F (36.8  C) (Tympanic)   Resp 12   Wt 94.3 kg (208 lb)   SpO2 97%   Breastfeeding? No   BMI 33.57 kg/m    Body mass index is 33.57 kg/m .  GENERAL APPEARANCE: healthy, alert, no distress and over weight      ASSESSMENT/PLAN:       1. Menorrhagia with regular cycle -reviewed the benign pathology with patient.  She is having tapering of her vaginal spotting/bleeding, which appears normal post surgically.  No signs of infection.  No further workup at this time    2. Visit for screening mammogram   - *MA Screening Digital Bilateral; Future    3. History of stroke -refill provided  - atorvastatin (LIPITOR) 40 MG tablet; Take 1 tablet (40 mg) by mouth daily  Dispense: 90 tablet; Refill: 3    4. Hyperlipidemia LDL goal <70 -due for cholesterol in May  - Lipid panel reflex to direct LDL Fasting; Future      Karina Shah,   [facility]

## 2019-03-05 NOTE — PATIENT INSTRUCTIONS
1. Ok to monitor the vaginal symptoms after surgery.  Signs of infection - fever, vaginal pain/burning, odor, itching.   2. You are due for a mammogram.  Please call [phone] to schedule.  3. Due in May for fasting labs          You should dispose of unwanted medications properly.  Most police deparments have drop boxes, typically Monday-Fri 8-4 pm    [facility]  --[facility] depository [place]   --[facility] Department depository  [facility]   --[facility] Office, [facility]  --Law Enforcement Center [address] [facility]   --[facility] [employer] [address]    --[place] [employer] [address] [facility]  --[facility] [address] [facility]  --Law Enforcement Center [address] [address] [facility]  --[place] Police Department  --[facility] Police Department, [place]  --[place] Police Deparment

## 2019-04-10 ENCOUNTER — ANTICOAGULATION THERAPY VISIT (OUTPATIENT)
Dept: ANTICOAGULATION | Facility: CLINIC | Age: 44
End: 2019-04-10
Payer: COMMERCIAL

## 2019-04-10 DIAGNOSIS — Z79.01 LONG TERM CURRENT USE OF ANTICOAGULANT: ICD-10-CM

## 2019-04-10 DIAGNOSIS — D68.59 PRIMARY HYPERCOAGULABLE STATE (H): ICD-10-CM

## 2019-04-10 LAB — INR POINT OF CARE: 2.7 (ref 0.86–1.14)

## 2019-04-10 PROCEDURE — 36416 COLLJ CAPILLARY BLOOD SPEC: CPT

## 2019-04-10 PROCEDURE — 85610 PROTHROMBIN TIME: CPT | Mod: QW

## 2019-04-10 PROCEDURE — 99207 ZZC NO CHARGE NURSE ONLY: CPT

## 2019-04-10 NOTE — PROGRESS NOTES
ANTICOAGULATION FOLLOW-UP CLINIC VISIT    Patient Name:  Belkis Christopher  Date:  4/10/2019  Contact Type:  Face to Face    SUBJECTIVE:     Patient Findings     Comments:   Patient states she may have missed a dose yesterday, INR is still therapeutic. Since she has been taking the same warfarin dose since 2018, will plan to continue the same warfarin dose for now.            OBJECTIVE    INR Protime   Date Value Ref Range Status   04/10/2019 2.7 (A) 0.86 - 1.14 Final       ASSESSMENT / PLAN  INR assessment THER    Recheck INR In: 4 WEEKS    INR Location Clinic      Anticoagulation Summary  As of 4/10/2019    INR goal:   2.0-3.0   TTR:   65.3 % (3.8 y)   INR used for dosin.7 (4/10/2019)   Warfarin maintenance plan:   2.5 mg (5 mg x 0.5) every Mon, Wed, Fri; 5 mg (5 mg x 1) all other days   Full warfarin instructions:   2.5 mg every Mon, Wed, Fri; 5 mg all other days   Weekly warfarin total:   27.5 mg   No change documented:   Brandy Bose RN   Plan last modified:   Brandy Bose RN (10/29/2018)   Next INR check:   2019   Priority:   INR   Target end date:   Indefinite    Indications    Long term current use of anticoagulant [Z79.01]  Cerebral artery occlusion with cerebral infarction (H) (Resolved) [I63.50]  Primary hypercoagulable state (H) [D68.59]             Anticoagulation Episode Summary     INR check location:       Preferred lab:       Send INR reminders to:   [facility]    Comments:   * CVA/TIA  (carotid artery), also FVL (hetero). patient frequently misses doses when out of town        Anticoagulation Care Providers     Provider Role Specialty Phone number    Pablo Karina Shahrzad,  [facility] Internal Medicine [phone]            See the Encounter Report to view Anticoagulation Flowsheet and Dosing Calendar (Go to Encounters tab in chart review, and find the Anticoagulation Therapy Visit)        Brandy Bose RN CACP

## 2019-05-07 ENCOUNTER — ANTICOAGULATION THERAPY VISIT (OUTPATIENT)
Dept: ANTICOAGULATION | Facility: CLINIC | Age: 44
End: 2019-05-07
Payer: COMMERCIAL

## 2019-05-07 DIAGNOSIS — Z79.01 LONG TERM CURRENT USE OF ANTICOAGULANT: ICD-10-CM

## 2019-05-07 DIAGNOSIS — D68.59 PRIMARY HYPERCOAGULABLE STATE (H): ICD-10-CM

## 2019-05-07 LAB — INR POINT OF CARE: 2.2 (ref 0.86–1.14)

## 2019-05-07 PROCEDURE — 85610 PROTHROMBIN TIME: CPT | Mod: QW

## 2019-05-07 PROCEDURE — 99207 ZZC NO CHARGE NURSE ONLY: CPT

## 2019-05-07 PROCEDURE — 36416 COLLJ CAPILLARY BLOOD SPEC: CPT

## 2019-05-07 RX ORDER — WARFARIN SODIUM 5 MG/1
TABLET ORAL
Qty: 90 TABLET | Refills: 0 | Status: SHIPPED | OUTPATIENT
Start: 2019-05-07 | End: 2019-08-13

## 2019-05-07 NOTE — PROGRESS NOTES
ANTICOAGULATION FOLLOW-UP CLINIC VISIT    Patient Name:  Belkis Christopher  Date:  2019  Contact Type:  Face to Face    SUBJECTIVE:     Patient Findings     Comments:   No changes in medications, diet, or activity. No concerns with bleeding or bruising. Took warfarin as prescribed.   Continue maintenance warfarin dose. Will recheck INR in 6 weeks.   Patient verbalizes understanding and agrees with plan. No further questions at this time.              OBJECTIVE    INR Protime   Date Value Ref Range Status   2019 2.2 (A) 0.86 - 1.14 Final       ASSESSMENT / PLAN  INR assessment THER    Recheck INR In: 6 WEEKS    INR Location Clinic      Anticoagulation Summary  As of 2019    INR goal:   2.0-3.0   TTR:   66.0 % (3.8 y)   INR used for dosin.2 (2019)   Warfarin maintenance plan:   2.5 mg (5 mg x 0.5) every Mon, Wed, Fri; 5 mg (5 mg x 1) all other days   Full warfarin instructions:   2.5 mg every Mon, Wed, Fri; 5 mg all other days   Weekly warfarin total:   27.5 mg   No change documented:   Gloria Perry RN   Plan last modified:   Brandy Bose RN (10/29/2018)   Next INR check:   2019   Priority:   INR   Target end date:   Indefinite    Indications    Long term current use of anticoagulant [Z79.01]  Cerebral artery occlusion with cerebral infarction (H) (Resolved) [I63.50]  Primary hypercoagulable state (H) [D68.59]             Anticoagulation Episode Summary     INR check location:       Preferred lab:       Send INR reminders to:   [facility]    Comments:   * CVA/TIA  (carotid artery), also FVL (hetero). patient frequently misses doses when out of town        Anticoagulation Care Providers     Provider Role Specialty Phone number    Karina Shah,  [facility] Internal Medicine [phone]            See the Encounter Report to view Anticoagulation Flowsheet and Dosing Calendar (Go to Encounters tab in chart review, and find the Anticoagulation Therapy  Visit)        Gloria Perry RN

## 2019-06-18 ENCOUNTER — ANTICOAGULATION THERAPY VISIT (OUTPATIENT)
Dept: ANTICOAGULATION | Facility: CLINIC | Age: 44
End: 2019-06-18
Payer: COMMERCIAL

## 2019-06-18 DIAGNOSIS — D68.59 PRIMARY HYPERCOAGULABLE STATE (H): ICD-10-CM

## 2019-06-18 DIAGNOSIS — Z79.01 LONG TERM CURRENT USE OF ANTICOAGULANT: ICD-10-CM

## 2019-06-18 LAB — INR POINT OF CARE: 1.9 (ref 0.86–1.14)

## 2019-06-18 PROCEDURE — 36416 COLLJ CAPILLARY BLOOD SPEC: CPT

## 2019-06-18 PROCEDURE — 85610 PROTHROMBIN TIME: CPT | Mod: QW

## 2019-06-18 PROCEDURE — 99207 ZZC NO CHARGE NURSE ONLY: CPT

## 2019-06-18 NOTE — PROGRESS NOTES
ANTICOAGULATION FOLLOW-UP CLINIC VISIT    Patient Name:  Belkis Christopher  Date:  2019  Contact Type:  Face to Face    SUBJECTIVE:  Patient Findings     Positives:   Change in health (Increase in greens.), Missed doses    Comments:   No changes in medications, activity, or health noted. No bleeding or increased bruising noted. Took warfarin as prescribed.  Patient missed a dose on Saturday so she made it up over the next 2 days.   Patient has a garden and is eating more greens.   Patient will continue on maintenance dose.   Recheck the INR in 2 weeks. If INR remains low, maintenance dose may be increased.  Patient verbalizes understanding and agrees to plan. No further questions or concerns.  Denies S/S clotting.          Clinical Outcomes     Comments:   No changes in medications, activity, or health noted. No bleeding or increased bruising noted. Took warfarin as prescribed.  Patient missed a dose on Saturday so she made it up over the next 2 days.   Patient has a garden and is eating more greens.   Patient will continue on maintenance dose.   Recheck the INR in 2 weeks. If INR remains low, maintenance dose may be increased.  Patient verbalizes understanding and agrees to plan. No further questions or concerns.  Denies S/S clotting.             OBJECTIVE    INR Protime   Date Value Ref Range Status   2019 1.9 (A) 0.86 - 1.14 Final       ASSESSMENT / PLAN  INR assessment SUB    Recheck INR In: 2 WEEKS    INR Location Clinic      Anticoagulation Summary  As of 2019    INR goal:   2.0-3.0   TTR:   66.0 % (4 y)   INR used for dosin.9! (2019)   Warfarin maintenance plan:   2.5 mg (5 mg x 0.5) every Mon, Wed, Fri; 5 mg (5 mg x 1) all other days   Full warfarin instructions:   2.5 mg every Mon, Wed, Fri; 5 mg all other days   Weekly warfarin total:   27.5 mg   Plan last modified:   Brandy Bose RN (10/29/2018)   Next INR check:   2019   Priority:   INR   Target end date:   Indefinite     Indications    Long term current use of anticoagulant [Z79.01]  Cerebral artery occlusion with cerebral infarction (H) (Resolved) [I63.50]  Primary hypercoagulable state (H) [D68.59]             Anticoagulation Episode Summary     INR check location:       Preferred lab:       Send INR reminders to:   [facility]    Comments:   * CVA/TIA 2000 (carotid artery), also FVL (hetero). patient frequently misses doses when out of town        Anticoagulation Care Providers     Provider Role Specialty Phone number    Karina Shah,  [facility] Internal Medicine [phone]            See the Encounter Report to view Anticoagulation Flowsheet and Dosing Calendar (Go to Encounters tab in chart review, and find the Anticoagulation Therapy Visit)        Christian Cochran RN

## 2019-07-02 ENCOUNTER — ANTICOAGULATION THERAPY VISIT (OUTPATIENT)
Dept: ANTICOAGULATION | Facility: CLINIC | Age: 44
End: 2019-07-02
Payer: COMMERCIAL

## 2019-07-02 DIAGNOSIS — D68.59 PRIMARY HYPERCOAGULABLE STATE (H): ICD-10-CM

## 2019-07-02 DIAGNOSIS — Z79.01 LONG TERM CURRENT USE OF ANTICOAGULANT: ICD-10-CM

## 2019-07-02 LAB — INR POINT OF CARE: 2.7 (ref 0.86–1.14)

## 2019-07-02 PROCEDURE — 85610 PROTHROMBIN TIME: CPT | Mod: QW

## 2019-07-02 PROCEDURE — 99207 ZZC NO CHARGE NURSE ONLY: CPT

## 2019-07-02 PROCEDURE — 36416 COLLJ CAPILLARY BLOOD SPEC: CPT

## 2019-07-02 NOTE — PROGRESS NOTES
ANTICOAGULATION FOLLOW-UP CLINIC VISIT    Patient Name:  Belkis Christopher  Date:  2019  Contact Type:  Face to Face    SUBJECTIVE:  Patient Findings     Positives:   Missed doses    Comments:   No changes in medications, activity, health, or diet noted. No bleeding or increased bruising noted.   Patient will continue weekly maintenance dose. INR is therapeutic.   Recheck in 2 weeks.   Patient verbalizes understanding and agrees to plan. No further questions or concerns.          Clinical Outcomes     Negatives:   Major bleeding event, Thromboembolic event, Anticoagulation-related hospital admission, Anticoagulation-related ED visit, Anticoagulation-related fatality    Comments:   No changes in medications, activity, health, or diet noted. No bleeding or increased bruising noted.   Patient will continue weekly maintenance dose. INR is therapeutic.   Recheck in 2 weeks.   Patient verbalizes understanding and agrees to plan. No further questions or concerns.             OBJECTIVE    INR Protime   Date Value Ref Range Status   2019 2.7 (A) 0.86 - 1.14 Final       ASSESSMENT / PLAN  INR assessment THER    Recheck INR In: 6 WEEKS    INR Location Clinic      Anticoagulation Summary  As of 2019    INR goal:   2.0-3.0   TTR:   66.2 % (4 y)   INR used for dosin.7 (2019)   Warfarin maintenance plan:   2.5 mg (5 mg x 0.5) every Mon, Wed, Fri; 5 mg (5 mg x 1) all other days   Full warfarin instructions:   2.5 mg every Mon, Wed, Fri; 5 mg all other days   Weekly warfarin total:   27.5 mg   No change documented:   Christian Cochran RN   Plan last modified:   Brandy Bose RN (10/29/2018)   Next INR check:   2019   Priority:   INR   Target end date:   Indefinite    Indications    Long term current use of anticoagulant [Z79.01]  Cerebral artery occlusion with cerebral infarction (H) (Resolved) [I63.50]  Primary hypercoagulable state (H) [D68.59]             Anticoagulation Episode Summary      INR check location:       Preferred lab:       Send INR reminders to:   [facility]    Comments:   * CVA/TIA 2000 (carotid artery), also FVL (hetero). patient frequently misses doses when out of town        Anticoagulation Care Providers     Provider Role Specialty Phone number    Karina Shah DO [facility] Internal Medicine [phone]            See the Encounter Report to view Anticoagulation Flowsheet and Dosing Calendar (Go to Encounters tab in chart review, and find the Anticoagulation Therapy Visit)        Christian Cochran RN

## 2019-08-13 ENCOUNTER — ANTICOAGULATION THERAPY VISIT (OUTPATIENT)
Dept: ANTICOAGULATION | Facility: CLINIC | Age: 44
End: 2019-08-13
Payer: COMMERCIAL

## 2019-08-13 DIAGNOSIS — D68.59 PRIMARY HYPERCOAGULABLE STATE (H): ICD-10-CM

## 2019-08-13 DIAGNOSIS — Z79.01 LONG TERM CURRENT USE OF ANTICOAGULANT: ICD-10-CM

## 2019-08-13 LAB — INR POINT OF CARE: 1.9 (ref 0.86–1.14)

## 2019-08-13 PROCEDURE — 36416 COLLJ CAPILLARY BLOOD SPEC: CPT

## 2019-08-13 PROCEDURE — 85610 PROTHROMBIN TIME: CPT | Mod: QW

## 2019-08-13 PROCEDURE — 99207 ZZC NO CHARGE NURSE ONLY: CPT

## 2019-08-13 RX ORDER — WARFARIN SODIUM 5 MG/1
TABLET ORAL
Qty: 90 TABLET | Refills: 0 | Status: SHIPPED | OUTPATIENT
Start: 2019-08-13 | End: 2019-10-22

## 2019-09-03 ENCOUNTER — ANTICOAGULATION THERAPY VISIT (OUTPATIENT)
Dept: ANTICOAGULATION | Facility: CLINIC | Age: 44
End: 2019-09-03
Payer: COMMERCIAL

## 2019-09-03 DIAGNOSIS — D68.59 PRIMARY HYPERCOAGULABLE STATE (H): ICD-10-CM

## 2019-09-03 DIAGNOSIS — Z79.01 LONG TERM CURRENT USE OF ANTICOAGULANT: ICD-10-CM

## 2019-09-03 LAB — INR POINT OF CARE: 4.2 (ref 0.86–1.14)

## 2019-09-03 PROCEDURE — 85610 PROTHROMBIN TIME: CPT | Mod: QW

## 2019-09-03 PROCEDURE — 99207 ZZC NO CHARGE NURSE ONLY: CPT

## 2019-09-03 PROCEDURE — 36416 COLLJ CAPILLARY BLOOD SPEC: CPT

## 2019-09-03 NOTE — PROGRESS NOTES
ANTICOAGULATION FOLLOW-UP CLINIC VISIT    Patient Name:  Belkis Christopher  Date:  9/3/2019  Contact Type:  Face to Face    SUBJECTIVE:  Patient Findings     Positives:   Missed doses (Missed a dose, made up for it)    Comments:   Patient is not exactly sure which day she missed. At first she thought it was Friday and she took an extra 2.5mg Sat/Sun. She then switched which day she missed, the holiday weekend has messed up her schedule so she is not certain.     Will plan to hold the warfarin dose today, recheck the INR in 10 days.     No issues with bleeding or unusual bruising noted.         Clinical Outcomes     Comments:   Patient is not exactly sure which day she missed. At first she thought it was Friday and she took an extra 2.5mg Sat/Sun. She then switched which day she missed, the holiday weekend has messed up her schedule so she is not certain.     Will plan to hold the warfarin dose today, recheck the INR in 10 days.     No issues with bleeding or unusual bruising noted.            OBJECTIVE    INR Protime   Date Value Ref Range Status   2019 4.2 (A) 0.86 - 1.14 Final       ASSESSMENT / PLAN  INR assessment SUPRA    Recheck INR In: 10 DAYS    INR Location Clinic      Anticoagulation Summary  As of 9/3/2019    INR goal:   2.0-3.0   TTR:   66.5 % (4.2 y)   INR used for dosin.2! (9/3/2019)   Warfarin maintenance plan:   2.5 mg (5 mg x 0.5) every Mon, Wed, Fri; 5 mg (5 mg x 1) all other days   Full warfarin instructions:   9/3: Hold; Otherwise 2.5 mg every Mon, Wed, Fri; 5 mg all other days   Weekly warfarin total:   27.5 mg   Plan last modified:   Brandy Bose RN (10/29/2018)   Next INR check:   2019   Priority:   INR   Target end date:   Indefinite    Indications    Long term current use of anticoagulant [Z79.01]  Cerebral artery occlusion with cerebral infarction (H) (Resolved) [I63.50]  Primary hypercoagulable state (H) [D68.59]             Anticoagulation Episode Summary     INR  check location:       Preferred lab:       Send INR reminders to:   [facility]    Comments:   * CVA/TIA 2000 (carotid artery), also FVL (hetero). patient frequently misses doses when out of town        Anticoagulation Care Providers     Provider Role Specialty Phone number    ShahAdileneKarina Sara,  [facility] Internal Medicine [phone]            See the Encounter Report to view Anticoagulation Flowsheet and Dosing Calendar (Go to Encounters tab in chart review, and find the Anticoagulation Therapy Visit)        Br[facility] Bose RN [facility]

## 2019-09-03 NOTE — PATIENT INSTRUCTIONS
Take extra caution not to injure yourself as your INR is above the goal range. Please have an extra serving of vitamin K rich foods (dark leafy green vegetables) to help lower your INR. A typical serving size is 1/2 - 1 cup, depending on the food.    Please go into the emergency room for the following concerns:    - You have a bad fall, hit your head, or get a deep cut OR you cannot control bleeding from a small cut by holding pressure  - You are coughing/throwing up blood, have red/black bowel movements, have red/orange urine  - You have a nose bleed that lasts for longer than 10 minutes  - You have a sudden severe headache

## 2019-09-19 ENCOUNTER — ANTICOAGULATION THERAPY VISIT (OUTPATIENT)
Dept: ANTICOAGULATION | Facility: CLINIC | Age: 44
End: 2019-09-19
Payer: COMMERCIAL

## 2019-09-19 DIAGNOSIS — D68.59 PRIMARY HYPERCOAGULABLE STATE (H): ICD-10-CM

## 2019-09-19 DIAGNOSIS — Z79.01 LONG TERM CURRENT USE OF ANTICOAGULANT: ICD-10-CM

## 2019-09-19 LAB — INR POINT OF CARE: 2.2 (ref 0.86–1.14)

## 2019-09-19 PROCEDURE — 85610 PROTHROMBIN TIME: CPT | Mod: QW

## 2019-09-19 PROCEDURE — 36416 COLLJ CAPILLARY BLOOD SPEC: CPT

## 2019-09-19 PROCEDURE — 99207 ZZC NO CHARGE NURSE ONLY: CPT

## 2019-09-19 NOTE — PROGRESS NOTES
ANTICOAGULATION FOLLOW-UP CLINIC VISIT    Patient Name:  Belkis Christopher  Date:  2019  Contact Type:  Face to Face    SUBJECTIVE:  Patient Findings     Comments:   No changes in medications, activity, or diet noted. No concerns with clotting, bleeding, or increased bruising noted. Took warfarin as prescribed.  Patient is to continue maintenance warfarin plan, and check INR in 3 weeks.  Patient verbalizes understanding and agrees to plan. No further questions or concerns.        Clinical Outcomes     Negatives:   Major bleeding event, Thromboembolic event, Anticoagulation-related hospital admission, Anticoagulation-related ED visit, Anticoagulation-related fatality    Comments:   No changes in medications, activity, or diet noted. No concerns with clotting, bleeding, or increased bruising noted. Took warfarin as prescribed.  Patient is to continue maintenance warfarin plan, and check INR in 3 weeks.  Patient verbalizes understanding and agrees to plan. No further questions or concerns.           OBJECTIVE    INR Protime   Date Value Ref Range Status   2019 2.2 (A) 0.86 - 1.14 Final       ASSESSMENT / PLAN  INR assessment THER    Recheck INR In: 3 WEEKS    INR Location Clinic      Anticoagulation Summary  As of 2019    INR goal:   2.0-3.0   TTR:   66.2 % (4.2 y)   INR used for dosin.2 (2019)   Warfarin maintenance plan:   2.5 mg (5 mg x 0.5) every Mon, Wed, Fri; 5 mg (5 mg x 1) all other days   Full warfarin instructions:   2.5 mg every Mon, Wed, Fri; 5 mg all other days   Weekly warfarin total:   27.5 mg   No change documented:   Vira Gan RN   Plan last modified:   Brandy Bose RN (10/29/2018)   Next INR check:   10/8/2019   Priority:   INR   Target end date:   Indefinite    Indications    Long term current use of anticoagulant [Z79.01]  Cerebral artery occlusion with cerebral infarction (H) (Resolved) [I63.50]  Primary hypercoagulable state (H) [D68.59]              Anticoagulation Episode Summary     INR check location:       Preferred lab:       Send INR reminders to:   LACHELLE BORREGO    Comments:   * CVA/TIA 2000 (carotid artery), also FVL (hetero). patient frequently misses doses when out of town        Anticoagulation Care Providers     Provider Role Specialty Phone number    PabloAdileneKarina DO [facility] Medicine [phone]            See the Encounter Report to view Anticoagulation Flowsheet and Dosing Calendar (Go to Encounters tab in chart review, and find the Anticoagulation Therapy Visit)        Vira Gan RN

## 2019-10-08 ENCOUNTER — ANTICOAGULATION THERAPY VISIT (OUTPATIENT)
Dept: ANTICOAGULATION | Facility: CLINIC | Age: 44
End: 2019-10-08
Payer: COMMERCIAL

## 2019-10-08 DIAGNOSIS — D68.59 PRIMARY HYPERCOAGULABLE STATE (H): ICD-10-CM

## 2019-10-08 DIAGNOSIS — Z79.01 LONG TERM CURRENT USE OF ANTICOAGULANT: ICD-10-CM

## 2019-10-08 LAB — INR POINT OF CARE: 1.9 (ref 0.86–1.14)

## 2019-10-08 PROCEDURE — 36416 COLLJ CAPILLARY BLOOD SPEC: CPT

## 2019-10-08 PROCEDURE — 85610 PROTHROMBIN TIME: CPT | Mod: QW

## 2019-10-08 PROCEDURE — 99207 ZZC NO CHARGE NURSE ONLY: CPT

## 2019-10-08 NOTE — PROGRESS NOTES
ANTICOAGULATION FOLLOW-UP CLINIC VISIT    Patient Name:  Belkis Christopher  Date:  10/8/2019  Contact Type:  Face to Face    SUBJECTIVE:  Patient Findings     Positives:   Change in diet/appetite (Brussel sprouts)    Comments:   No changes in medications, activity, or health noted. No bleeding or increased bruising noted. Took warfarin as prescribed.  Patient was finishing the last of the brussel sprouts from the garden.  Patient will continue weekly maintenance dose.  Recheck in 2 weeks.  Patient verbalizes understanding and agrees to plan. No further questions or concerns.               Clinical Outcomes     Negatives:   Major bleeding event, Thromboembolic event, Anticoagulation-related hospital admission, Anticoagulation-related ED visit, Anticoagulation-related fatality    Comments:   No changes in medications, activity, or health noted. No bleeding or increased bruising noted. Took warfarin as prescribed.  Patient was finishing the last of the brussel sprouts from the garden.  Patient will continue weekly maintenance dose.  Recheck in 2 weeks.  Patient verbalizes understanding and agrees to plan. No further questions or concerns.                  OBJECTIVE    INR Protime   Date Value Ref Range Status   10/08/2019 1.9 (A) 0.86 - 1.14 Final       ASSESSMENT / PLAN  INR assessment SUB    Recheck INR In: 2 WEEKS    INR Location Clinic      Anticoagulation Summary  As of 10/8/2019    INR goal:   2.0-3.0   TTR:   66.2 % (4.3 y)   INR used for dosin.9! (10/8/2019)   Warfarin maintenance plan:   2.5 mg (5 mg x 0.5) every Mon, Wed, Fri; 5 mg (5 mg x 1) all other days   Full warfarin instructions:   2.5 mg every Mon, Wed, Fri; 5 mg all other days   Weekly warfarin total:   27.5 mg   No change documented:   Christian Cochran RN   Plan last modified:   Brandy Bose RN (10/29/2018)   Next INR check:   10/22/2019   Priority:   INR   Target end date:   Indefinite    Indications    Long term current use of  anticoagulant [Z79.01]  Cerebral artery occlusion with cerebral infarction (H) (Resolved) [I63.50]  Primary hypercoagulable state (H) [D68.59]             Anticoagulation Episode Summary     INR check location:       Preferred lab:       Send INR reminders to:   LACHELLE BORREGO    Comments:   * CVA/TIA 2000 (carotid artery), also FVL (hetero). patient frequently misses doses when out of town        Anticoagulation Care Providers     Provider Role Specialty Phone number    Karina Shah,  [facility] Internal Medicine [phone]            See the Encounter Report to view Anticoagulation Flowsheet and Dosing Calendar (Go to Encounters tab in chart review, and find the Anticoagulation Therapy Visit)        Christian Cochran RN

## 2019-10-22 ENCOUNTER — ANTICOAGULATION THERAPY VISIT (OUTPATIENT)
Dept: ANTICOAGULATION | Facility: CLINIC | Age: 44
End: 2019-10-22
Payer: COMMERCIAL

## 2019-10-22 DIAGNOSIS — D68.59 PRIMARY HYPERCOAGULABLE STATE (H): ICD-10-CM

## 2019-10-22 DIAGNOSIS — Z79.01 LONG TERM CURRENT USE OF ANTICOAGULANT: ICD-10-CM

## 2019-10-22 LAB — INR POINT OF CARE: 1.8 (ref 0.86–1.14)

## 2019-10-22 PROCEDURE — 99207 ZZC NO CHARGE NURSE ONLY: CPT

## 2019-10-22 PROCEDURE — 85610 PROTHROMBIN TIME: CPT | Mod: QW

## 2019-10-22 PROCEDURE — 36416 COLLJ CAPILLARY BLOOD SPEC: CPT

## 2019-10-22 RX ORDER — WARFARIN SODIUM 5 MG/1
TABLET ORAL
Qty: 90 TABLET | Refills: 0 | COMMUNITY
Start: 2019-10-22 | End: 2019-12-24

## 2019-10-22 NOTE — PROGRESS NOTES
ANTICOAGULATION FOLLOW-UP CLINIC VISIT    Patient Name:  Belkis Christopher  Date:  10/22/2019  Contact Type:  Face to Face    SUBJECTIVE:  Patient Findings     Comments:   Patient missed a dose but made up for it the next day. It is not clear why her INR is low today. This is the second low INR, will plan to give a booster dose today then begin a new maintenance dose that is ~9% of an increase. INR will be rechecked in 2 weeks.    Denies any signs/symptoms of a clot. No other changes noted per patient.         Clinical Outcomes     Negatives:   Major bleeding event, Thromboembolic event, Anticoagulation-related hospital admission, Anticoagulation-related ED visit, Anticoagulation-related fatality    Comments:   Patient missed a dose but made up for it the next day. It is not clear why her INR is low today. This is the second low INR, will plan to give a booster dose today then begin a new maintenance dose that is ~9% of an increase. INR will be rechecked in 2 weeks.    Denies any signs/symptoms of a clot. No other changes noted per patient.            OBJECTIVE    INR Protime   Date Value Ref Range Status   10/22/2019 1.8 (A) 0.86 - 1.14 Final       ASSESSMENT / PLAN  INR assessment SUB    Recheck INR In: 2 WEEKS    INR Location Clinic      Anticoagulation Summary  As of 10/22/2019    INR goal:   2.0-3.0   TTR:   65.6 % (4.3 y)   INR used for dosin.8! (10/22/2019)   Warfarin maintenance plan:   2.5 mg (5 mg x 0.5) every Mon, Fri; 5 mg (5 mg x 1) all other days   Full warfarin instructions:   10/22: 7.5 mg; Otherwise 2.5 mg every Mon, Fri; 5 mg all other days   Weekly warfarin total:   30 mg   Plan last modified:   Brandy Bose RN (10/22/2019)   Next INR check:   2019   Priority:   INR   Target end date:   Indefinite    Indications    Long term current use of anticoagulant [Z79.01]  Cerebral artery occlusion with cerebral infarction (H) (Resolved) [I63.50]  Primary hypercoagulable state (H)  [D68.59]             Anticoagulation Episode Summary     INR check location:       Preferred lab:       Send INR reminders to:   LACHELLE [place]    Comments:   * CVA/TIA 2000 (carotid artery), also FVL (hetero). patient frequently misses doses when out of town        Anticoagulation Care Providers     Provider Role Specialty Phone number    Karina ShahDO [facility] Internal Medicine [phone]            See the Encounter Report to view Anticoagulation Flowsheet and Dosing Calendar (Go to Encounters tab in chart review, and find the Anticoagulation Therapy Visit)        Brandy Bose RN CACP

## 2019-11-05 ENCOUNTER — ANTICOAGULATION THERAPY VISIT (OUTPATIENT)
Dept: ANTICOAGULATION | Facility: CLINIC | Age: 44
End: 2019-11-05
Payer: COMMERCIAL

## 2019-11-05 DIAGNOSIS — D68.59 PRIMARY HYPERCOAGULABLE STATE (H): ICD-10-CM

## 2019-11-05 DIAGNOSIS — Z79.01 LONG TERM CURRENT USE OF ANTICOAGULANT: ICD-10-CM

## 2019-11-05 LAB — INR POINT OF CARE: 2.6 (ref 0.86–1.14)

## 2019-11-05 PROCEDURE — 36416 COLLJ CAPILLARY BLOOD SPEC: CPT

## 2019-11-05 PROCEDURE — 85610 PROTHROMBIN TIME: CPT | Mod: QW

## 2019-11-05 PROCEDURE — 99207 ZZC NO CHARGE NURSE ONLY: CPT

## 2019-11-05 NOTE — PROGRESS NOTES
ANTICOAGULATION FOLLOW-UP CLINIC VISIT    Patient Name:  Belkis Christopher  Date:  2019  Contact Type:  Face to Face    SUBJECTIVE:  Patient Findings     Comments:   Patient reports no changes in medication, activity, or diet. Patient reports no changes in health. Patient reports has taken warfarin as instructed. Patient reports no increased bruising or bleeding and no signs or symptoms of a blood clot. Will plan to continue maintenance dose and recheck INR in 4 weeks. Patient to call [facility] with any changes or concerns. Patient verbalized understanding of all instructions, denies questions or concerns at this time.             Clinical Outcomes     Negatives:   Major bleeding event, Thromboembolic event, Anticoagulation-related hospital admission, Anticoagulation-related ED visit, Anticoagulation-related fatality    Comments:   Patient reports no changes in medication, activity, or diet. Patient reports no changes in health. Patient reports has taken warfarin as instructed. Patient reports no increased bruising or bleeding and no signs or symptoms of a blood clot. Will plan to continue maintenance dose and recheck INR in 4 weeks. Patient to call [facility] with any changes or concerns. Patient verbalized understanding of all instructions, denies questions or concerns at this time.                OBJECTIVE    INR Protime   Date Value Ref Range Status   2019 2.6 (A) 0.86 - 1.14 Final       ASSESSMENT / PLAN  INR assessment THER    Recheck INR In: 4 WEEKS    INR Location Clinic      Anticoagulation Summary  As of 2019    INR goal:   2.0-3.0   TTR:   65.7 % (4.3 y)   INR used for dosin.6 (2019)   Warfarin maintenance plan:   2.5 mg (5 mg x 0.5) every Mon, Fri; 5 mg (5 mg x 1) all other days   Full warfarin instructions:   2.5 mg every Mon, Fri; 5 mg all other days   Weekly warfarin total:   30 mg   No change documented:   Marii Temple RN   Plan last modified:   Brandy Bose RN (10/22/2019)    Next INR check:   12/5/2019   Priority:   INR   Target end date:   Indefinite    Indications    Long term current use of anticoagulant [Z79.01]  Cerebral artery occlusion with cerebral infarction (H) (Resolved) [I63.50]  Primary hypercoagulable state (H) [D68.59]             Anticoagulation Episode Summary     INR check location:       Preferred lab:       Send INR reminders to:   LACHELLE BORREGO    Comments:   * CVA/TIA 2000 (carotid artery), also FVL (hetero). patient frequently misses doses when out of town        Anticoagulation Care Providers     Provider Role Specialty Phone number    ShahAdileneKarina Sara,  [facility] Internal Medicine [phone]            See the Encounter Report to view Anticoagulation Flowsheet and Dosing Calendar (Go to Encounters tab in chart review, and find the Anticoagulation Therapy Visit)      Marii Temple RN

## 2019-12-05 ENCOUNTER — ANTICOAGULATION THERAPY VISIT (OUTPATIENT)
Dept: ANTICOAGULATION | Facility: CLINIC | Age: 44
End: 2019-12-05
Payer: COMMERCIAL

## 2019-12-05 DIAGNOSIS — Z79.01 LONG TERM CURRENT USE OF ANTICOAGULANT: ICD-10-CM

## 2019-12-05 DIAGNOSIS — D68.59 PRIMARY HYPERCOAGULABLE STATE (H): ICD-10-CM

## 2019-12-05 LAB — INR POINT OF CARE: 2.6 (ref 0.86–1.14)

## 2019-12-05 PROCEDURE — 85610 PROTHROMBIN TIME: CPT | Mod: QW

## 2019-12-05 PROCEDURE — 99207 ZZC NO CHARGE NURSE ONLY: CPT

## 2019-12-05 PROCEDURE — 36416 COLLJ CAPILLARY BLOOD SPEC: CPT

## 2019-12-05 NOTE — PROGRESS NOTES
ANTICOAGULATION FOLLOW-UP CLINIC VISIT    Patient Name:  Belkis Christopher  Date:  2019  Contact Type:  Face to Face    SUBJECTIVE:  Patient Findings     Positives:   Missed doses (Missed dose on Monday)    Comments:   No changes in medications, activity, or diet noted. No concerns with clotting, bleeding, or increased bruising noted.  Patient is to continue maintenance warfarin plan, and check INR in about 4 weeks.  Patient verbalizes understanding and agrees to plan. No further questions or concerns.        Clinical Outcomes     Negatives:   Major bleeding event, Thromboembolic event, Anticoagulation-related hospital admission, Anticoagulation-related ED visit, Anticoagulation-related fatality    Comments:   No changes in medications, activity, or diet noted. No concerns with clotting, bleeding, or increased bruising noted.  Patient is to continue maintenance warfarin plan, and check INR in about 4 weeks.  Patient verbalizes understanding and agrees to plan. No further questions or concerns.           OBJECTIVE    INR Protime   Date Value Ref Range Status   2019 2.6 (A) 0.86 - 1.14 Final       ASSESSMENT / PLAN  INR assessment THER    Recheck INR In: 4 WEEKS    INR Location Clinic      Anticoagulation Summary  As of 2019    INR goal:   2.0-3.0   TTR:   79.1 % (1 y)   INR used for dosin.6 (2019)   Warfarin maintenance plan:   2.5 mg (5 mg x 0.5) every Mon, Fri; 5 mg (5 mg x 1) all other days   Full warfarin instructions:   2.5 mg every Mon, Fri; 5 mg all other days   Weekly warfarin total:   30 mg   No change documented:   Vira Gan RN   Plan last modified:   Brandy Bose RN (10/22/2019)   Next INR check:   2020   Priority:   Maintenance   Target end date:   Indefinite    Indications    Long term current use of anticoagulant [Z79.01]  Cerebral artery occlusion with cerebral infarction (H) (Resolved) [I63.50]  Primary hypercoagulable state (H) [D68.59]              Anticoagulation Episode Summary     INR check location:       Preferred lab:       Send INR reminders to:   LACHELLE BORREGO    Comments:   * CVA/TIA 2000 (carotid artery), also FVL (hetero). patient frequently misses doses when out of town        Anticoagulation Care Providers     Provider Role Specialty Phone number    PabloAdileneKarina DO [facility] Internal Medicine [phone]            See the Encounter Report to view Anticoagulation Flowsheet and Dosing Calendar (Go to Encounters tab in chart review, and find the Anticoagulation Therapy Visit)        Vira Gan RN

## 2019-12-23 DIAGNOSIS — Z79.01 LONG TERM CURRENT USE OF ANTICOAGULANT: ICD-10-CM

## 2019-12-23 NOTE — TELEPHONE ENCOUNTER
"Requested Prescriptions   Pending Prescriptions Disp Refills     warfarin ANTICOAGULANT (COUMADIN) 5 MG tablet 90 tablet 0     Si.5 mg (5 mg x 0.5) on Mon, Fri; 5 mg (5 mg x 1) all other days or As directed by Anticoagulation Clinic   Last Written Prescription Date:  10/22/19  Last Fill Quantity: 90 tab,  # refills: 0   Last office visit: 3/5/19 previous visit found with prescribing provider:  Karina Shah     Future Office Visit:        Vitamin K Antagonists Failed - 2019  2:25 PM        Failed - INR is within goal in the past 6 weeks     Confirm INR is within goal in the past 6 weeks.     Recent Labs   Lab Test 19   INR 2.6*                       Passed - Recent (12 mo) or future (30 days) visit within the authorizing provider's specialty     Patient has had an office visit with the authorizing provider or a provider within the authorizing providers department within the previous 12 mos or has a future within next 30 days. See \"Patient Info\" tab in inbasket, or \"Choose Columns\" in Meds & Orders section of the refill encounter.              Passed - Medication is active on med list        Passed - Patient is 18 years of age or older        Passed - Patient is not pregnant        Passed - No positive pregnancy on file in past 12 months          "

## 2019-12-24 RX ORDER — WARFARIN SODIUM 5 MG/1
TABLET ORAL
Qty: 85 TABLET | Refills: 0 | Status: SHIPPED | OUTPATIENT
Start: 2019-12-24 | End: 2020-03-30

## 2020-01-14 ENCOUNTER — ANTICOAGULATION THERAPY VISIT (OUTPATIENT)
Dept: ANTICOAGULATION | Facility: CLINIC | Age: 45
End: 2020-01-14
Payer: COMMERCIAL

## 2020-01-14 DIAGNOSIS — D68.59 PRIMARY HYPERCOAGULABLE STATE (H): ICD-10-CM

## 2020-01-14 DIAGNOSIS — Z79.01 LONG TERM CURRENT USE OF ANTICOAGULANT: ICD-10-CM

## 2020-01-14 LAB — INR POINT OF CARE: 2.2 (ref 0.86–1.14)

## 2020-01-14 PROCEDURE — 99207 ZZC NO CHARGE NURSE ONLY: CPT

## 2020-01-14 PROCEDURE — 85610 PROTHROMBIN TIME: CPT | Mod: QW

## 2020-01-14 PROCEDURE — 36416 COLLJ CAPILLARY BLOOD SPEC: CPT

## 2020-01-14 NOTE — PROGRESS NOTES
ANTICOAGULATION FOLLOW-UP CLINIC VISIT    Patient Name:  Belkis Christopher  Date:  2020  Contact Type:  Face to Face    SUBJECTIVE:  Patient Findings     Positives:   Missed doses    Comments:   No changes in medications, activity, health, or diet noted. No bleeding or increased bruising noted. Took warfarin as prescribed.  Patient will continue weekly maintenance dose. INR is therapeutic.   Recheck in 6 weeks.   Patient verbalizes understanding and agrees to plan. No further questions or concerns.          Clinical Outcomes     Negatives:   Major bleeding event, Thromboembolic event, Anticoagulation-related hospital admission, Anticoagulation-related ED visit, Anticoagulation-related fatality    Comments:   No changes in medications, activity, health, or diet noted. No bleeding or increased bruising noted. Took warfarin as prescribed.  Patient will continue weekly maintenance dose. INR is therapeutic.   Recheck in 6 weeks.   Patient verbalizes understanding and agrees to plan. No further questions or concerns.             OBJECTIVE    INR Protime   Date Value Ref Range Status   2020 2.2 (A) 0.86 - 1.14 Final       ASSESSMENT / PLAN  INR assessment THER    Recheck INR In: 6 WEEKS    INR Location Clinic      Anticoagulation Summary  As of 2020    INR goal:   2.0-3.0   TTR:   79.1 % (1 y)   INR used for dosin.2 (2020)   Warfarin maintenance plan:   2.5 mg (5 mg x 0.5) every Mon, Fri; 5 mg (5 mg x 1) all other days   Full warfarin instructions:   2.5 mg every Mon, Fri; 5 mg all other days   Weekly warfarin total:   30 mg   No change documented:   Christian Cochran RN   Plan last modified:   Brandy Bose RN (10/22/2019)   Next INR check:   2020   Priority:   Maintenance   Target end date:   Indefinite    Indications    Long term current use of anticoagulant [Z79.01]  Cerebral artery occlusion with cerebral infarction (H) (Resolved) [I63.50]  Primary hypercoagulable state (H)  [D68.59]             Anticoagulation Episode Summary     INR check location:       Preferred lab:       Send INR reminders to:   AYOFINN [place]    Comments:   * CVA/TIA 2000 (carotid artery), also FVL (hetero). patient frequently misses doses when out of town        Anticoagulation Care Providers     Provider Role Specialty Phone number    Karina Shah DO [facility] Internal Medicine [phone]            See the Encounter Report to view Anticoagulation Flowsheet and Dosing Calendar (Go to Encounters tab in chart review, and find the Anticoagulation Therapy Visit)        Christian Cochran RN

## 2020-02-19 ENCOUNTER — OFFICE VISIT (OUTPATIENT)
Dept: FAMILY MEDICINE | Facility: CLINIC | Age: 45
End: 2020-02-19
Payer: COMMERCIAL

## 2020-02-19 VITALS
TEMPERATURE: 97.1 F | HEIGHT: 66 IN | WEIGHT: 195 LBS | BODY MASS INDEX: 31.34 KG/M2 | DIASTOLIC BLOOD PRESSURE: 83 MMHG | RESPIRATION RATE: 18 BRPM | HEART RATE: 85 BPM | SYSTOLIC BLOOD PRESSURE: 133 MMHG | OXYGEN SATURATION: 99 %

## 2020-02-19 DIAGNOSIS — S09.90XA INJURY OF HEAD, INITIAL ENCOUNTER: ICD-10-CM

## 2020-02-19 DIAGNOSIS — S06.0X9A CONCUSSION WITH LOSS OF CONSCIOUSNESS, INITIAL ENCOUNTER: Primary | ICD-10-CM

## 2020-02-19 DIAGNOSIS — R53.83 FATIGUE, UNSPECIFIED TYPE: ICD-10-CM

## 2020-02-19 PROCEDURE — 99214 OFFICE O/P EST MOD 30 MIN: CPT | Performed by: INTERNAL MEDICINE

## 2020-02-19 ASSESSMENT — MIFFLIN-ST. JEOR: SCORE: 1551.26

## 2020-02-19 NOTE — PROGRESS NOTES
Subjective     Belkis Christopher is a 44 year old female who presents to clinic today for the following health issues:    HPI     SORE THROAT, TENDER GLANDS      Duration: three days    Description  sore throat, headache and fatigue/malaise, felt foggy yesterday but better this morning    Severity: mild- sore throat worsening through the day    Accompanying signs and symptoms: No fevers, chills, swallowing problem, cough, ear ache, sinus pain    History (predisposing factors):  none    Precipitating or alleviating factors: patient slipped & fell on ice at home same day throat became sore but throat was sore before the fall    No exposure to strep throat    Therapies tried and outcome:  None    She wonders about a possible thyroid problem    SLIP & FALL      Duration: 2/17/2020    Description (location/character/radiation): fell on tailbone, hit head, lower back.  She did have LOC,  sat her in a chair after the fall but she doesn't remember this, out for a couple of minutes.      Intensity:  mild, moderate    Accompanying signs and symptoms: no bruising. No immediate headache, confusion.  Did have some mild nausea after the fall.  No limb weakness.  No visual problems.      History (similar episodes/previous evaluation): None    Precipitating or alleviating factors: Patient is on warfarin therapy for history of stroke.    Therapies tried and outcome: Tylenol only for aches/pain.       Patient Active Problem List   Diagnosis     Long term current use of anticoagulant     Malaise and fatigue     History of stroke     Hyperlipidemia LDL goal <70     Subclinical hypothyroidism     Primary hypercoagulable state (H)     Vitamin D deficiency     Menorrhagia with regular cycle     Past Surgical History:   Procedure Laterality Date     DILATION AND CURETTAGE, HYSTEROSCOPY, ABLATE ENDOMETRIUM, COMBINED N/A 1/28/2019    Procedure: Hysteroscopy, endometrial sampling, endometrial ablation. Dee.;  Surgeon: Adis Brower  "MD [place];  Location: [place] OR     GYN SURGERY         Social History     Tobacco Use     Smoking status: Never Smoker     Smokeless tobacco: Never Used   Substance Use Topics     Alcohol use: Yes     Comment: occ.     Family History   Problem Relation Age of Onset     Hypertension Father          Current Outpatient Medications   Medication Sig Dispense Refill     atorvastatin (LIPITOR) 40 MG tablet Take 1 tablet (40 mg) by mouth daily 90 tablet 3     cholecalciferol (D 5000) 5000 units CAPS Take 1 capsule (5,000 Units) by mouth daily 90 capsule 3     coenzyme Q-10 200 MG CAPS Take 200 mg by mouth daily       warfarin ANTICOAGULANT (COUMADIN) 5 MG tablet 2.5 mg (5 mg x 0.5) on Mon, Fri; 5 mg (5 mg x 1) all other days or As directed by Anticoagulation Clinic 85 tablet 0     ASPIRIN NOT PRESCRIBED, INTENTIONAL, 1 each daily Antiplatelet medication not prescribed intentionally due to Current anticoagulant therapy (warfarin/enoxaparin) 0 each 0     Ferrous Sulfate (IRON SUPPLEMENT PO)        Allergies   Allergen Reactions     Nka [No Known Allergies]          Reviewed and updated as needed this visit by Provider         Review of Systems   ROS COMP: Constitutional, neuro, HEENT systems are negative, except as otherwise noted.      Objective    /83   Pulse 85   Temp 97.1  F (36.2  C) (Tympanic)   Resp 18   Ht 1.676 m (5' 6\")   Wt 88.5 kg (195 lb)   LMP  (LMP Unknown)   SpO2 99%   Breastfeeding No   BMI 31.47 kg/m    Body mass index is 31.47 kg/m .  Physical Exam     GENERAL: alert and no distress  EYES: Eyes grossly normal to inspection, PERRL and conjunctivae and sclerae normal  HENT:  nose and mouth without ulcers or lesions  NECK: no adenopathy, no asymmetry, masses, or scars.  She has pain to palpation along the anterior neck bilaterally, no spinal pain  RESP: lungs clear to auscultation - no rales, rhonchi or wheezes  CV: regular rate and rhythm, normal S1 S2, no S3 or S4, no murmur, click or rub  NEURO: " Cranial nerves intact, normal strength and tone, sensory exam grossly normal, mentation intact, DTR's normal and symmetric at patellas, gait normal including heel/toe/tandem walking and Romberg normal, normal finger to nose and heel to shin tests   MSK: pain around tailbone          Assessment & Plan     1. Concussion with loss of consciousness, initial encounter  and  2. Injury of head, initial encounter    Belkis presents two days after falling on ice and hitting her head and having LOC.  She describes symptoms consistent with mild concussion- these are currently improving.  Home care of concussion discussed.  She is on warfarin, so do recommend head CT.  By the time we finished her clinic evaluation it was 5:15pm, so not able to get stat CT through clinic.  Discussed that she could go to ER to get CT done this evening, but she elects to wait until tomorrow, which I think should be fine since symptoms have been improving.  Advised her to go to ER overnight if worsening symptoms develop.  The neck pain appears to be muscular related to her injury based on exam.     - CT Head w/o Contrast; Future    3. Fatigue, unspecified type    She's had slightly high TSH with normal T4 in the past and would like to recheck lab due to fatigue.     - TSH with free T4 reflex     [place] Kc MD  [facility]

## 2020-02-19 NOTE — PATIENT INSTRUCTIONS
Patient Education     Concussion    A concussion is a type of brain injury. It can be caused by a direct hit or blow to the head, neck, face, or body. The force of the blow makes the head and brain shake quickly back and forth. In some cases you may lose consciousness. Depending on the severity of the blow, it will take from a few hours up to a few days to get better. Sometimes symptoms may last a few months or longer. This is called post-concussion syndrome.  At first, you may have a headache, nausea, vomiting, or dizziness. You may also have problems concentrating or remembering things. This is normal.  Symptoms should get better as the hours and days go by. Symptoms that get worse could be a sign of a more serious brain injury. This might be a bruise or bleeding in the brain. That s why it s important to watch for the warning signs listed below.  School-age children are more at risk for symptoms that don t go away after a concussion. They should be watched very closely.   Home care  If your injury is mild and there are no serious signs or symptoms, your healthcare provider may recommend that you be watched at home. If there is evidence that the injury is more serious, you will be watched in the hospital. Follow these tips to help care for yourself at home:    After a concussion, your healthcare provider may recommend that a family member or friend watched you for 12 to 24 hours. They may be told to wake you every few hours during sleep to check for the signs below.    If your face or scalp swells, apply an ice pack for 20 minutes every 1 to 2 hours. Do this until the swelling starts to go down. To make an ice pack, put ice cubes in a plastic bag that seals at the top. Wrap the bag in a clean, thin towel or cloth. Never put ice or an ice pack directly on the skin.    You may use acetaminophen to control pain, unless another pain medicine was prescribed. Don't use aspirin or ibuprofen after a head injury. If you  have long-lasting (chronic) liver or kidney disease, talk with your healthcare provider before using these medicines. Also talk with your provider if you ever had a stomach ulcer or gastrointestinal bleeding.    For the next 24 hours:  ? Don t drink alcohol or take sedatives or medicines that make you sleepy.  ? Don t drive or operate machinery.  ? Don't do anything strenuous. Don t lift or strain.    Don t return right away to sports or to any activity where you could hit your head. Wait until all symptoms are gone and you have been cleared by your healthcare provider. Having a second head injury before you fully recover from the first one can lead to serious brain injury.    After a few days, it s OK to go back to your normal daily activities. But don t do anything that could cause your head to be hit again.  Follow-up care  Follow up with your healthcare provider in 1 week, or as directed.  A radiologist will review any X-rays or CT scans that were taken. You will be told of any new findings that may affect your care.  When to seek medical advice  Call your healthcare provider right away if any of these occur:    Headache or dizziness that won t go away    Redness, warmth, or pus from the swollen area  Call 911  Call 911 or get medical care right away if any of these occur:    Repeated vomiting (it s common to vomit once after a head injury)    Headache or dizziness that is severe or gets worse    Loss of consciousness    Unusual drowsiness, or unable to wake up as usual    Weakness or decreased ability to walk or move any limb    Confusion, agitation, or change in behavior or speech, or memory loss    Blurred vision    Convulsion (seizure)    Swelling on the scalp or face that gets worse    Changes in pupil size (the black part of the eye)    Fluid draining from or bleeding from the nose or ears  Date Last Reviewed: 6/1/2018 2000-2019 [employer]. [address], [place] [address]. All  rights reserved. This information is not intended as a substitute for professional medical care. Always follow your healthcare professional's instructions.

## 2020-02-20 ENCOUNTER — HOSPITAL ENCOUNTER (OUTPATIENT)
Dept: CT IMAGING | Facility: CLINIC | Age: 45
Discharge: HOME | End: 2020-02-20
Attending: INTERNAL MEDICINE | Admitting: INTERNAL MEDICINE
Payer: COMMERCIAL

## 2020-02-20 ENCOUNTER — TELEPHONE (OUTPATIENT)
Dept: FAMILY MEDICINE | Facility: CLINIC | Age: 45
End: 2020-02-20

## 2020-02-20 DIAGNOSIS — S09.90XA INJURY OF HEAD, INITIAL ENCOUNTER: ICD-10-CM

## 2020-02-20 DIAGNOSIS — R53.83 FATIGUE, UNSPECIFIED TYPE: Primary | ICD-10-CM

## 2020-02-20 DIAGNOSIS — R53.83 FATIGUE, UNSPECIFIED TYPE: ICD-10-CM

## 2020-02-20 LAB
T4 FREE SERPL-MCNC: 0.92 NG/DL (ref 0.76–1.46)
TSH SERPL DL<=0.005 MIU/L-ACNC: 4.57 MU/L (ref 0.4–4)

## 2020-02-20 PROCEDURE — 84443 ASSAY THYROID STIM HORMONE: CPT | Performed by: INTERNAL MEDICINE

## 2020-02-20 PROCEDURE — 70450 CT HEAD/BRAIN W/O DYE: CPT

## 2020-02-20 PROCEDURE — 84439 ASSAY OF FREE THYROXINE: CPT | Performed by: INTERNAL MEDICINE

## 2020-02-20 PROCEDURE — 36415 COLL VENOUS BLD VENIPUNCTURE: CPT | Performed by: INTERNAL MEDICINE

## 2020-02-20 NOTE — TELEPHONE ENCOUNTER
Patient went to lab after appointment yesterday, but lab was already mostly closed down, so she decided to return today.  Looks like lab cancelled order from yesterday and didn't reorder as future.  New lab order placed.    Thanks,  Modesto Kc MD

## 2020-02-25 ENCOUNTER — ANTICOAGULATION THERAPY VISIT (OUTPATIENT)
Dept: ANTICOAGULATION | Facility: CLINIC | Age: 45
End: 2020-02-25
Payer: COMMERCIAL

## 2020-02-25 DIAGNOSIS — Z79.01 LONG TERM CURRENT USE OF ANTICOAGULANT: ICD-10-CM

## 2020-02-25 DIAGNOSIS — D68.59 PRIMARY HYPERCOAGULABLE STATE (H): ICD-10-CM

## 2020-02-25 LAB — INR POINT OF CARE: 2.8 (ref 0.86–1.14)

## 2020-02-25 PROCEDURE — 36416 COLLJ CAPILLARY BLOOD SPEC: CPT

## 2020-02-25 PROCEDURE — 99207 ZZC NO CHARGE NURSE ONLY: CPT

## 2020-02-25 PROCEDURE — 85610 PROTHROMBIN TIME: CPT | Mod: QW

## 2020-02-25 NOTE — PROGRESS NOTES
ANTICOAGULATION FOLLOW-UP CLINIC VISIT    Patient Name:  Belkis Christopher  Date:  2020  Contact Type:  Face to Face    SUBJECTIVE:  Patient Findings     Positives:   Other complaints ( Fall on ice. CT negative.)    Comments:   No changes in medications, activity, health, or diet noted. No bleeding or increased bruising noted. Took warfarin as prescribed.  Patient will continue weekly maintenance dose. INR is therapeutic.   Recheck in 6 weeks.   Patient verbalizes understanding and agrees to plan. No further questions or concerns.          Clinical Outcomes     Negatives:   Major bleeding event, Thromboembolic event, Anticoagulation-related hospital admission, Anticoagulation-related ED visit, Anticoagulation-related fatality    Comments:   No changes in medications, activity, health, or diet noted. No bleeding or increased bruising noted. Took warfarin as prescribed.  Patient will continue weekly maintenance dose. INR is therapeutic.   Recheck in 6 weeks.   Patient verbalizes understanding and agrees to plan. No further questions or concerns.             OBJECTIVE    INR Protime   Date Value Ref Range Status   2020 2.8 (A) 0.86 - 1.14 Final       ASSESSMENT / PLAN  INR assessment THER    Recheck INR In: 6 WEEKS    INR Location Clinic      Anticoagulation Summary  As of 2020    INR goal:   2.0-3.0   TTR:   80.1 % (1 y)   INR used for dosin.8 (2020)   Warfarin maintenance plan:   2.5 mg (5 mg x 0.5) every Mon, Fri; 5 mg (5 mg x 1) all other days   Full warfarin instructions:   2.5 mg every Mon, Fri; 5 mg all other days   Weekly warfarin total:   30 mg   No change documented:   Christian Cochran RN   Plan last modified:   Brandy Bose RN (10/22/2019)   Next INR check:   2020   Priority:   Maintenance   Target end date:   Indefinite    Indications    Long term current use of anticoagulant [Z79.01]  Cerebral artery occlusion with cerebral infarction (H) (Resolved)  [I63.50]  Primary hypercoagulable state (H) [D68.59]             Anticoagulation Episode Summary     INR check location:       Preferred lab:       Send INR reminders to:   LACHELLE BORREGO    Comments:   * CVA/TIA 2000 (carotid artery), also FVL (hetero). patient frequently misses doses when out of town        Anticoagulation Care Providers     Provider Role Specialty Phone number    Karina Shah DO [facility] Medicine [phone]            See the Encounter Report to view Anticoagulation Flowsheet and Dosing Calendar (Go to Encounters tab in chart review, and find the Anticoagulation Therapy Visit)        Christian Cochran RN

## 2020-03-10 ENCOUNTER — HEALTH MAINTENANCE LETTER (OUTPATIENT)
Age: 45
End: 2020-03-10

## 2020-03-20 DIAGNOSIS — D68.59 PRIMARY HYPERCOAGULABLE STATE (H): Primary | ICD-10-CM

## 2020-03-30 DIAGNOSIS — Z79.01 LONG TERM CURRENT USE OF ANTICOAGULANT: ICD-10-CM

## 2020-03-30 RX ORDER — WARFARIN SODIUM 5 MG/1
TABLET ORAL
Qty: 85 TABLET | Refills: 0 | Status: SHIPPED | OUTPATIENT
Start: 2020-03-30 | End: 2020-05-18

## 2020-03-30 NOTE — TELEPHONE ENCOUNTER
Current warfarin dose:  Full warfarin instructions:   2.5 mg every Mon, Fri; 5 mg all other days   Weekly warfarin total:   30 mg     Last office visit: 2-    Last INR result:  INR Protime   Date Value Ref Range Status   02/25/2020 2.8 (A) 0.86 - 1.14 Final       Refill authorized per [facility] protocol.    ASAD Temple RN BSN

## 2020-04-08 ENCOUNTER — ANTICOAGULATION THERAPY VISIT (OUTPATIENT)
Dept: ANTICOAGULATION | Facility: CLINIC | Age: 45
End: 2020-04-08

## 2020-04-08 DIAGNOSIS — D68.59 PRIMARY HYPERCOAGULABLE STATE (H): ICD-10-CM

## 2020-04-08 DIAGNOSIS — Z79.01 LONG TERM CURRENT USE OF ANTICOAGULANT: ICD-10-CM

## 2020-04-08 LAB
CAPILLARY BLOOD COLLECTION: NORMAL
INR PPP: 2 (ref 0.86–1.14)

## 2020-04-08 PROCEDURE — 85610 PROTHROMBIN TIME: CPT | Performed by: INTERNAL MEDICINE

## 2020-04-08 PROCEDURE — 99207 ZZC NO CHARGE NURSE ONLY: CPT

## 2020-04-08 PROCEDURE — 36416 COLLJ CAPILLARY BLOOD SPEC: CPT | Performed by: INTERNAL MEDICINE

## 2020-04-08 NOTE — PROGRESS NOTES
ANTICOAGULATION FOLLOW-UP CLINIC VISIT    Patient Name:  Belkis Christopher  Date:  2020  Contact Type:  Telephone    SUBJECTIVE:  Patient Findings     Positives:   Missed doses    Comments:   No changes in diet, activity level, medications (including over the counter), or health. Patient did miss a dose last weekend. No signs of clots or bleeding concerns. Patient will continue maintenance warfarin dosing. Writer discussed what she should do if she misses a dose again, as this is a frequent concern for her.            Clinical Outcomes     Comments:   No changes in diet, activity level, medications (including over the counter), or health. Patient did miss a dose last weekend. No signs of clots or bleeding concerns. Patient will continue maintenance warfarin dosing. Writer discussed what she should do if she misses a dose again, as this is a frequent concern for her.               OBJECTIVE    INR   Date Value Ref Range Status   2020 2.00 (H) 0.86 - 1.14 Final     Comment:     This test is intended for monitoring Coumadin therapy.  Results are not   accurate in patients with prolonged INR due to factor deficiency.         ASSESSMENT / PLAN  INR assessment THER    Recheck INR In: 6 WEEKS    INR Location Clinic      Anticoagulation Summary  As of 2020    INR goal:   2.0-3.0   TTR:   81.9 % (1 y)   INR used for dosin.00 (2020)   Warfarin maintenance plan:   2.5 mg (5 mg x 0.5) every Mon, Fri; 5 mg (5 mg x 1) all other days   Full warfarin instructions:   2.5 mg every Mon, Fri; 5 mg all other days   Weekly warfarin total:   30 mg   No change documented:   Deb Roman RN   Plan last modified:   Brandy Bose RN (10/22/2019)   Next INR check:   2020   Priority:   Maintenance   Target end date:   Indefinite    Indications    Long term current use of anticoagulant [Z79.01]  Cerebral artery occlusion with cerebral infarction (H) (Resolved) [I63.50]  Primary hypercoagulable state (H)  [D68.59]             Anticoagulation Episode Summary     INR check location:       Preferred lab:       Send INR reminders to:   AYOFINN [place]    Comments:   * CVA/TIA 2000 (carotid artery), also FVL (hetero). patient frequently misses doses when out of town        Anticoagulation Care Providers     Provider Role Specialty Phone number    Karina Shah DO [facility] Internal Medicine [phone]            See the Encounter Report to view Anticoagulation Flowsheet and Dosing Calendar (Go to Encounters tab in chart review, and find the Anticoagulation Therapy Visit)        Deb Roman RN

## 2020-05-14 DIAGNOSIS — Z86.73 HISTORY OF STROKE: ICD-10-CM

## 2020-05-14 DIAGNOSIS — E78.5 HYPERLIPIDEMIA LDL GOAL <70: Primary | ICD-10-CM

## 2020-05-14 DIAGNOSIS — E03.8 SUBCLINICAL HYPOTHYROIDISM: ICD-10-CM

## 2020-05-14 DIAGNOSIS — N92.0 MENORRHAGIA WITH REGULAR CYCLE: ICD-10-CM

## 2020-05-14 DIAGNOSIS — Z79.01 LONG TERM CURRENT USE OF ANTICOAGULANT: ICD-10-CM

## 2020-05-14 NOTE — LETTER
[facility]  [address] [address]  Phone: [phone]    May 19, 2020       Belkis Christopher  [address] [place] [id number]            Dear Belkis:    We are concerned about your health care.  We recently provided you with medication refills.  Lab tests are required every year in order to continue refilling your Atorvastatin.  Fasting orders have been placed in our computer and should be accessible at most [facility]. Please complete this lab work as soon as possible. Please schedule a outpatient lab appointment at [phone].        Thank You,      Karina Shah DO / Vilma GREENE RN

## 2020-05-15 NOTE — TELEPHONE ENCOUNTER
"Requested Prescriptions   Pending Prescriptions Disp Refills     atorvastatin (LIPITOR) 40 MG tablet [Pharmacy Med Name: ATORVASTATIN CALCIUM 40MG TABS]  Last Written Prescription Date:  3/5/19  Last Fill Quantity: 90,  # refills: 3   Last Office Visit with [facility], P or [facility] prescribing provider:  2/19/20   Future Office Visit:      90 tablet 3     Sig: TAKE ONE TABLET BY MOUTH ONCE DAILY       Statins Protocol Failed - 5/14/2020  3:12 PM        Failed - LDL on file in past 12 months     Recent Labs   Lab Test 05/02/18  1621   *             Passed - No abnormal creatine kinase in past 12 months     No lab results found.             Passed - Recent (12 mo) or future (30 days) visit within the authorizing provider's specialty     Patient has had an office visit with the authorizing provider or a provider within the authorizing providers department within the previous 12 mos or has a future within next 30 days. See \"Patient Info\" tab in inbasket, or \"Choose Columns\" in Meds & Orders section of the refill encounter.              Passed - Medication is active on med list        Passed - Patient is age 18 or older        Passed - No active pregnancy on record        Passed - No positive pregnancy test in past 12 months             "

## 2020-05-18 RX ORDER — WARFARIN SODIUM 5 MG/1
TABLET ORAL
Qty: 85 TABLET | Refills: 0 | Status: SHIPPED | OUTPATIENT
Start: 2020-05-18 | End: 2020-10-20

## 2020-05-18 RX ORDER — ATORVASTATIN CALCIUM 40 MG/1
TABLET, FILM COATED ORAL
Qty: 90 TABLET | Refills: 0 | Status: SHIPPED | OUTPATIENT
Start: 2020-05-18 | End: 2020-10-20

## 2020-05-18 NOTE — TELEPHONE ENCOUNTER
Routing refill request to provider for review/approval because:  Labs not current:  Lipids    [facility] manages INRs and warfarin    Nidhi GONZALEZ RN, BSN

## 2020-05-29 ENCOUNTER — ANTICOAGULATION THERAPY VISIT (OUTPATIENT)
Dept: INTERNAL MEDICINE | Facility: CLINIC | Age: 45
End: 2020-05-29

## 2020-05-29 ENCOUNTER — APPOINTMENT (OUTPATIENT)
Dept: LAB | Facility: CLINIC | Age: 45
End: 2020-05-29
Payer: COMMERCIAL

## 2020-05-29 DIAGNOSIS — E03.8 SUBCLINICAL HYPOTHYROIDISM: ICD-10-CM

## 2020-05-29 DIAGNOSIS — Z79.01 LONG TERM CURRENT USE OF ANTICOAGULANT: ICD-10-CM

## 2020-05-29 DIAGNOSIS — D68.59 PRIMARY HYPERCOAGULABLE STATE (H): ICD-10-CM

## 2020-05-29 DIAGNOSIS — N92.0 MENORRHAGIA WITH REGULAR CYCLE: ICD-10-CM

## 2020-05-29 DIAGNOSIS — E78.5 HYPERLIPIDEMIA LDL GOAL <70: ICD-10-CM

## 2020-05-29 LAB
ANION GAP SERPL CALCULATED.3IONS-SCNC: 6 MMOL/L (ref 3–14)
BUN SERPL-MCNC: 14 MG/DL (ref 7–30)
CALCIUM SERPL-MCNC: 8.7 MG/DL (ref 8.5–10.1)
CHLORIDE SERPL-SCNC: 105 MMOL/L (ref 94–109)
CHOLEST SERPL-MCNC: 157 MG/DL
CO2 SERPL-SCNC: 28 MMOL/L (ref 20–32)
CREAT SERPL-MCNC: 0.72 MG/DL (ref 0.52–1.04)
ERYTHROCYTE [DISTWIDTH] IN BLOOD BY AUTOMATED COUNT: 13.8 % (ref 10–15)
GFR SERPL CREATININE-BSD FRML MDRD: >90 ML/MIN/{1.73_M2}
GLUCOSE SERPL-MCNC: 84 MG/DL (ref 70–99)
HCT VFR BLD AUTO: 40.2 % (ref 35–47)
HDLC SERPL-MCNC: 49 MG/DL
HGB BLD-MCNC: 13.6 G/DL (ref 11.7–15.7)
INR PPP: 2.16 (ref 0.86–1.14)
LDLC SERPL CALC-MCNC: 84 MG/DL
MCH RBC QN AUTO: 30 PG (ref 26.5–33)
MCHC RBC AUTO-ENTMCNC: 33.8 G/DL (ref 31.5–36.5)
MCV RBC AUTO: 89 FL (ref 78–100)
NONHDLC SERPL-MCNC: 108 MG/DL
PLATELET # BLD AUTO: 299 10E9/L (ref 150–450)
POTASSIUM SERPL-SCNC: 3.5 MMOL/L (ref 3.4–5.3)
RBC # BLD AUTO: 4.54 10E12/L (ref 3.8–5.2)
SODIUM SERPL-SCNC: 139 MMOL/L (ref 133–144)
T4 FREE SERPL-MCNC: 0.92 NG/DL (ref 0.76–1.46)
TRIGL SERPL-MCNC: 120 MG/DL
TSH SERPL DL<=0.005 MIU/L-ACNC: 5.1 MU/L (ref 0.4–4)
WBC # BLD AUTO: 7.8 10E9/L (ref 4–11)

## 2020-05-29 PROCEDURE — 85027 COMPLETE CBC AUTOMATED: CPT | Performed by: INTERNAL MEDICINE

## 2020-05-29 PROCEDURE — 99207 ZZC NO CHARGE NURSE ONLY: CPT | Performed by: INTERNAL MEDICINE

## 2020-05-29 PROCEDURE — 85610 PROTHROMBIN TIME: CPT | Performed by: INTERNAL MEDICINE

## 2020-05-29 PROCEDURE — 84439 ASSAY OF FREE THYROXINE: CPT | Performed by: INTERNAL MEDICINE

## 2020-05-29 PROCEDURE — 36415 COLL VENOUS BLD VENIPUNCTURE: CPT | Performed by: INTERNAL MEDICINE

## 2020-05-29 PROCEDURE — 80048 BASIC METABOLIC PNL TOTAL CA: CPT | Performed by: INTERNAL MEDICINE

## 2020-05-29 PROCEDURE — 80061 LIPID PANEL: CPT | Performed by: INTERNAL MEDICINE

## 2020-05-29 PROCEDURE — 84443 ASSAY THYROID STIM HORMONE: CPT | Performed by: INTERNAL MEDICINE

## 2020-05-29 NOTE — PROGRESS NOTES
ANTICOAGULATION FOLLOW-UP TELEPHONE / MYCHART VISIT    Patient Name:  Belkis Christopher  Date:  2020  Contact Type:  Telephone/ I called and received VM.  See note below.  Therapeutic INR result with same plan of care.    SUBJECTIVE: 6 wk lamont  Patient Findings     Comments:   Unable to speak with patient, left voicemail that INR is in range.  I advised I am sending a mychart message with the details as her chart states she prefers that.        Clinical Outcomes     Negatives:   Major bleeding event, Thromboembolic event, Anticoagulation-related hospital admission, Anticoagulation-related ED visit, Anticoagulation-related fatality    Comments:   Unable to speak with patient, left voicemail that INR is in range.  I advised I am sending a mychart message with the details as her chart states she prefers that.           OBJECTIVE    Recent labs: (last 7 days)     20  1554   INR 2.16*       ASSESSMENT / PLAN  INR assessment THER    Recheck INR In: 6 WEEKS    INR Location Outside lab      Anticoagulation Summary  As of 2020    INR goal:   2.0-3.0   TTR:   81.9 % (1 y)   INR used for dosin.16 (2020)   Warfarin maintenance plan:   2.5 mg (5 mg x 0.5) every Mon, Fri; 5 mg (5 mg x 1) all other days   Full warfarin instructions:   2.5 mg every Mon, Fri; 5 mg all other days   Weekly warfarin total:   30 mg   No change documented:   Adrianna Robbins, RN   Plan last modified:   Brandy Bose RN (10/22/2019)   Next INR check:   7/10/2020   Priority:   Maintenance   Target end date:   Indefinite    Indications    Long term current use of anticoagulant [Z79.01]  Cerebral artery occlusion with cerebral infarction (H) (Resolved) [I63.50]  Primary hypercoagulable state (H) [D68.59]             Anticoagulation Episode Summary     INR check location:       Preferred lab:       Send INR reminders to:   LACHELLE BORREGO    Comments:   * CVA/TIA  (carotid artery), also FVL (hetero). patient frequently misses  doses when out of town        Anticoagulation Care Providers     Provider Role Specialty Phone number    Karina Shah DO [facility] Internal Medicine [phone]            See the Encounter Report to view Anticoagulation Flowsheet and Dosing Calendar (Go to Encounters tab in chart review, and find the Anticoagulation Therapy Visit)    Dosage adjustment made based on physician directed care plan.    Adrianna Robbins RN

## 2020-07-17 ENCOUNTER — TELEPHONE (OUTPATIENT)
Dept: ANTICOAGULATION | Facility: CLINIC | Age: 45
End: 2020-07-17

## 2020-07-17 NOTE — LETTER
July 17, 2020      Belkis Christopher  [address]  [place] [address]      Dear Belkis,    We are contacting you because our records show you were due for an INR on 7/10/20.      Your last INR:  INR   Date Value Ref Range Status   05/29/2020 2.16 (H) 0.86 - 1.14 Final       There are potentially serious risks when taking warfarin without careful monitoring, and we want to make sure you are safely managed.    Please call the clinic and we will be happy to help you schedule an appointment.  If there has been a change in your care, or other concerns, please let us know so we can help and/or update our records.    To make an appointment, please contact one of the following numbers:    [place] - [phone]  [place] - [phone]  INR clinic phone: [phone]

## 2020-07-17 NOTE — TELEPHONE ENCOUNTER
Belkis Ashwin is overdue for INR check.      First reminder letter sent     INR was due: 7/10/20    Last INR result:  INR   Date Value Ref Range Status   05/29/2020 2.16 (H) 0.86 - 1.14 Final

## 2020-07-29 ENCOUNTER — ANTICOAGULATION THERAPY VISIT (OUTPATIENT)
Dept: ANTICOAGULATION | Facility: CLINIC | Age: 45
End: 2020-07-29

## 2020-07-29 DIAGNOSIS — D68.59 PRIMARY HYPERCOAGULABLE STATE (H): ICD-10-CM

## 2020-07-29 DIAGNOSIS — Z79.01 LONG TERM CURRENT USE OF ANTICOAGULANT: ICD-10-CM

## 2020-07-29 LAB
CAPILLARY BLOOD COLLECTION: NORMAL
INR PPP: 2.4 (ref 0.86–1.14)

## 2020-07-29 PROCEDURE — 85610 PROTHROMBIN TIME: CPT | Performed by: INTERNAL MEDICINE

## 2020-07-29 PROCEDURE — 36416 COLLJ CAPILLARY BLOOD SPEC: CPT | Performed by: INTERNAL MEDICINE

## 2020-07-29 NOTE — PROGRESS NOTES
ANTICOAGULATION MANAGEMENT     Patient Name:  Belkis Christopher  Date:  2020    ASSESSMENT /SUBJECTIVE:    Today's INR result of 2.40 is therapeutic. Goal INR of 2.0-3.0      Previous INR: Therapeutic 2.16    Additional findings: non-detailed voicemail left for patient to either call [facility] back OR to check mychart -- detailed mychart message sent to patient    PLAN:    Sent detailed mychart regarding INR result and instructed:     Warfarin Dosing Instructions: Continue your current warfarin dose 2.5mg Mon,Fri; 5mg all other days    Instructed patient to follow up no later than: 6 weeks  Sent detailed mychart message with recommended recheck date    Education provided: Importance of notifying clinic for changes in medications/health      Instructed to call the Anticoagulation Clinic for any changes, questions or concerns. (#[phone])      OBJECTIVE:  Recent labs: (last 7 days)     20  1531   INR 2.40*         INR assessment THER    Recheck INR In: 6 WEEKS    INR Location Clinic      Anticoagulation Summary  As of 2020    INR goal:   2.0-3.0   TTR:   86.2 % (1 y)   INR used for dosin.40 (2020)   Warfarin maintenance plan:   2.5 mg (5 mg x 0.5) every Mon, Fri; 5 mg (5 mg x 1) all other days   Full warfarin instructions:   2.5 mg every Mon, Fri; 5 mg all other days   Weekly warfarin total:   30 mg   No change documented:   Brandy Bose RN   Plan last modified:   Brandy Bose RN (10/22/2019)   Next INR check:      Priority:   Maintenance   Target end date:   Indefinite    Indications    Long term current use of anticoagulant [Z79.01]  Cerebral artery occlusion with cerebral infarction (H) (Resolved) [I63.50]  Primary hypercoagulable state (H) [D68.59]             Anticoagulation Episode Summary     INR check location:       Preferred lab:       Send INR reminders to:   LACHELEL BORREGO    Comments:   * CVA/TIA  (carotid artery), also FVL (hetero). patient frequently misses  doses when out of town        Anticoagulation Care Providers     Provider Role Specialty Phone number    Karina Shah DO [facility] Internal Medicine [phone]

## 2020-09-11 ENCOUNTER — ANTICOAGULATION THERAPY VISIT (OUTPATIENT)
Dept: ANTICOAGULATION | Facility: CLINIC | Age: 45
End: 2020-09-11

## 2020-09-11 ENCOUNTER — TELEPHONE (OUTPATIENT)
Dept: ANTICOAGULATION | Facility: CLINIC | Age: 45
End: 2020-09-11

## 2020-09-11 DIAGNOSIS — Z79.01 LONG TERM CURRENT USE OF ANTICOAGULANT: ICD-10-CM

## 2020-09-11 DIAGNOSIS — Z79.01 LONG TERM CURRENT USE OF ANTICOAGULANT: Primary | ICD-10-CM

## 2020-09-11 DIAGNOSIS — D68.59 PRIMARY HYPERCOAGULABLE STATE (H): ICD-10-CM

## 2020-09-11 LAB
CAPILLARY BLOOD COLLECTION: NORMAL
INR PPP: 3.1 (ref 0.86–1.14)

## 2020-09-11 PROCEDURE — 85610 PROTHROMBIN TIME: CPT | Performed by: INTERNAL MEDICINE

## 2020-09-11 PROCEDURE — 36416 COLLJ CAPILLARY BLOOD SPEC: CPT | Performed by: INTERNAL MEDICINE

## 2020-09-11 PROCEDURE — 99207 ZZC NO CHARGE NURSE ONLY: CPT

## 2020-09-11 NOTE — PROGRESS NOTES
ANTICOAGULATION FOLLOW-UP CLINIC VISIT    Patient Name:  Belkis Christopher  Date:  9/11/2020  Contact Type:  Telephone/ Left DVM/Sent [employer] message.    SUBJECTIVE:  Patient Findings     Comments:   Unable to assess.   Left a DVM. My chart message sent.  Patient will continue weekly maintenance dose.   Recheck in 2 weeks.        Clinical Outcomes     Negatives:   Major bleeding event, Thromboembolic event, Anticoagulation-related hospital admission, Anticoagulation-related ED visit, Anticoagulation-related fatality    Comments:   Unable to assess.   Left a DVM. My chart message sent.  Patient will continue weekly maintenance dose.   Recheck in 2 weeks.           OBJECTIVE    Recent labs: (last 7 days)     09/11/20  1444   INR 3.10*       ASSESSMENT / PLAN  INR assessment SUPRA    Recheck INR In: 2 WEEKS    INR Location Outside lab      Anticoagulation Summary  As of 9/11/2020    INR goal:   2.0-3.0   TTR:   91.6 % (1 y)   INR used for dosing:   3.10! (9/11/2020)   Warfarin maintenance plan:   2.5 mg (5 mg x 0.5) every Mon, Fri; 5 mg (5 mg x 1) all other days   Full warfarin instructions:   2.5 mg every Mon, Fri; 5 mg all other days   Weekly warfarin total:   30 mg   No change documented:   Christian Cochran RN   Plan last modified:   Brandy Bose RN (10/22/2019)   Next INR check:   9/25/2020   Priority:   High   Target end date:   Indefinite    Indications    Long term current use of anticoagulant [Z79.01]  Cerebral artery occlusion with cerebral infarction (H) (Resolved) [I63.50]  Primary hypercoagulable state (H) [D68.59]             Anticoagulation Episode Summary     INR check location:       Preferred lab:       Send INR reminders to:   LACHELLE BORREGO    Comments:   * CVA/TIA 2000 (carotid artery), also FVL (hetero). patient frequently misses doses when out of town        Anticoagulation Care Providers     Provider Role Specialty Phone number    Karina Shah,  [facility] Internal Medicine  [phone]            See the Encounter Report to view Anticoagulation Flowsheet and Dosing Calendar (Go to Encounters tab in chart review, and find the Anticoagulation Therapy Visit)        Christian Cochran RN

## 2020-09-11 NOTE — TELEPHONE ENCOUNTER
ANTICOAGULATION MANAGEMENT      Belkis Christopher due for annual renewal of referral to anticoagulation monitoring. Order pended for your review and signature.      ANTICOAGULATION SUMMARY      Warfarin indication(s)     HyperCoaguable State    Heart valve present?  NO       Current goal range   INR: 2.0-3.0     Goal appropriate for indication? Yes, INR 2-3 appropriate for hx of DVT, PE, hypercoagulable state, Afib, LVAD, or bileaflet AVR without risk factors     Current duration of therapy Indefinite/long term therapy   Time in Therapeutic Range (TTR)  (Goal > 60%) 86.2 %       Office visit with referring provider's group within last year yes on 2/19/2020       Christian Cochran RN

## 2020-09-14 NOTE — PROGRESS NOTES
09/14/20  As of 10:34 AM, patient has not yet read her [employer] message.     Writer called patient again asking that she confirms she received [facility]'s message by either calling our clinic or opening her [employer] message.    Dago RODARTE RN, CACP

## 2020-09-25 ENCOUNTER — ANTICOAGULATION THERAPY VISIT (OUTPATIENT)
Dept: ANTICOAGULATION | Facility: CLINIC | Age: 45
End: 2020-09-25

## 2020-09-25 DIAGNOSIS — D68.59 PRIMARY HYPERCOAGULABLE STATE (H): ICD-10-CM

## 2020-09-25 DIAGNOSIS — Z79.01 LONG TERM CURRENT USE OF ANTICOAGULANT: ICD-10-CM

## 2020-09-25 LAB
CAPILLARY BLOOD COLLECTION: NORMAL
INR PPP: 2.8 (ref 0.86–1.14)

## 2020-09-25 PROCEDURE — 85610 PROTHROMBIN TIME: CPT | Performed by: INTERNAL MEDICINE

## 2020-09-25 PROCEDURE — 36416 COLLJ CAPILLARY BLOOD SPEC: CPT | Performed by: INTERNAL MEDICINE

## 2020-09-25 NOTE — PROGRESS NOTES
ANTICOAGULATION MANAGEMENT     Patient Name:  Belkis Christopher  Date:  2020    ASSESSMENT /SUBJECTIVE:    Today's INR result of 2.80 is therapeutic. Goal INR of 2.0-3.0      Previous INR: Supratherapeutic 3.10    Additional findings: Left non-detailed voicemail for patient to call [facility] back or check [employer] message.      PLAN:    Corresponded via [employer] regarding INR result and instructed:     Warfarin Dosing Instructions: Continue your current warfarin dose 2.5mg Mon/Fri; 5mg all other days    Instructed patient to follow up no later than: 4 weeks  Left detailed message with recommended recheck date    Education provided: Please call back if any changes to your diet, medications or how you've been taking warfarin      Instructed to call the Anticoagulation Clinic for any changes, questions or concerns. (#[phone])        Brandy Bose RN CACP       OBJECTIVE:  Recent labs: (last 7 days)     20  1532   INR 2.80*         INR assessment THER    Recheck INR In: 4 WEEKS    INR Location Clinic      Anticoagulation Summary  As of 2020    INR goal:   2.0-3.0   TTR:   90.5 % (1 y)   INR used for dosin.80 (2020)   Warfarin maintenance plan:   2.5 mg (5 mg x 0.5) every Mon, Fri; 5 mg (5 mg x 1) all other days   Full warfarin instructions:   2.5 mg every Mon, Fri; 5 mg all other days   Weekly warfarin total:   30 mg   No change documented:   Brandy Bose RN   Plan last modified:   Brandy Bose RN (10/22/2019)   Next INR check:   10/23/2020   Priority:   Maintenance   Target end date:   Indefinite    Indications    Long term current use of anticoagulant [Z79.01]  Cerebral artery occlusion with cerebral infarction (H) (Resolved) [I63.50]  Primary hypercoagulable state (H) [D68.59]             Anticoagulation Episode Summary     INR check location:       Preferred lab:       Send INR reminders to:   LACHELLE BORREGO    Comments:   * CVA/TIA  (carotid artery), also FVL  (hetero). patient frequently misses doses when out of town        Anticoagulation Care Providers     Provider Role Specialty Phone number    Karina Shah DO [facility] [phone]

## 2020-10-20 DIAGNOSIS — Z86.73 HISTORY OF STROKE: ICD-10-CM

## 2020-10-20 DIAGNOSIS — Z79.01 LONG TERM CURRENT USE OF ANTICOAGULANT: ICD-10-CM

## 2020-10-20 RX ORDER — ATORVASTATIN CALCIUM 40 MG/1
TABLET, FILM COATED ORAL
Qty: 30 TABLET | Refills: 0 | Status: SHIPPED | OUTPATIENT
Start: 2020-10-20 | End: 2020-12-08

## 2020-10-20 RX ORDER — WARFARIN SODIUM 5 MG/1
TABLET ORAL
Qty: 85 TABLET | Refills: 0 | Status: SHIPPED | OUTPATIENT
Start: 2020-10-20 | End: 2021-03-03

## 2020-10-20 NOTE — TELEPHONE ENCOUNTER
Medication (statin) is being filled for 1 time refill only due to:  Patient needs to be seen because due for Physical and PAP..   [email or web address] message sent to pt.    Request for warfarin refill sent to [facility].    Nidhi GONZALEZ RN, BSN

## 2020-10-20 NOTE — TELEPHONE ENCOUNTER
"Requested Prescriptions   Pending Prescriptions Disp Refills     warfarin ANTICOAGULANT (JANTOVEN ANTICOAGULANT) 5 MG tablet [Pharmacy Med Name: JANTOVEN 5MG TABS] 85 tablet 0     Sig: TAKE 1/2 TABLET BY MOUTH ONCE DAILY ON MONDAY  AND FRIDAY AND TAKE 1 TABLET ONCE DAILY ON ALL OTHER DAYS OR AS DIRECTED BY ANTI COAGULATION CLINIC       Vitamin K Antagonists Failed - 10/20/2020  2:19 PM        Failed - INR is within goal in the past 6 weeks     Confirm INR is within goal in the past 6 weeks.     Recent Labs   Lab Test 09/25/20  1532   INR 2.80*                       Passed - Recent (12 mo) or future (30 days) visit within the authorizing provider's specialty     Patient has had an office visit with the authorizing provider or a provider within the authorizing providers department within the previous 12 mos or has a future within next 30 days. See \"Patient Info\" tab in inbasket, or \"Choose Columns\" in Meds & Orders section of the refill encounter.              Passed - Medication is active on med list        Passed - Patient is 18 years of age or older        Passed - Patient is not pregnant        Passed - No positive pregnancy on file in past 12 months           atorvastatin (LIPITOR) 40 MG tablet [Pharmacy Med Name: ATORVASTATIN CALCIUM 40MG TABS] 90 tablet 0     Sig: TAKE ONE TABLET BY MOUTH ONCE DAILY       Statins Protocol Passed - 10/20/2020  2:19 PM        Passed - LDL on file in past 12 months     Recent Labs   Lab Test 05/29/20  1554   LDL 84             Passed - No abnormal creatine kinase in past 12 months     No lab results found.             Passed - Recent (12 mo) or future (30 days) visit within the authorizing provider's specialty     Patient has had an office visit with the authorizing provider or a provider within the authorizing providers department within the previous 12 mos or has a future within next 30 days. See \"Patient Info\" tab in inbasket, or \"Choose Columns\" in Meds & Orders section of the " refill encounter.              Passed - Medication is active on med list        Passed - Patient is age 18 or older        Passed - No active pregnancy on record        Passed - No positive pregnancy test in past 12 months

## 2020-10-23 ENCOUNTER — ANTICOAGULATION THERAPY VISIT (OUTPATIENT)
Dept: ANTICOAGULATION | Facility: CLINIC | Age: 45
End: 2020-10-23

## 2020-10-23 ENCOUNTER — IMMUNIZATION (OUTPATIENT)
Dept: FAMILY MEDICINE | Facility: CLINIC | Age: 45
End: 2020-10-23
Payer: COMMERCIAL

## 2020-10-23 DIAGNOSIS — Z79.01 LONG TERM CURRENT USE OF ANTICOAGULANT: ICD-10-CM

## 2020-10-23 DIAGNOSIS — D68.59 PRIMARY HYPERCOAGULABLE STATE (H): ICD-10-CM

## 2020-10-23 LAB
CAPILLARY BLOOD COLLECTION: NORMAL
INR PPP: 2.6 (ref 0.86–1.14)

## 2020-10-23 PROCEDURE — 90471 IMMUNIZATION ADMIN: CPT

## 2020-10-23 PROCEDURE — 85610 PROTHROMBIN TIME: CPT | Performed by: INTERNAL MEDICINE

## 2020-10-23 PROCEDURE — 90686 IIV4 VACC NO PRSV 0.5 ML IM: CPT

## 2020-10-23 PROCEDURE — 99207 PR NO CHARGE NURSE ONLY: CPT

## 2020-10-23 PROCEDURE — 36416 COLLJ CAPILLARY BLOOD SPEC: CPT | Performed by: INTERNAL MEDICINE

## 2020-10-23 NOTE — PROGRESS NOTES
ANTICOAGULATION FOLLOW-UP CLINIC VISIT    Patient Name:  Belkis Christopher  Date:  10/23/2020  Contact Type:  Telephone/ Left DVM/Sent my chart message.    SUBJECTIVE:  Patient Findings     Comments:  My chart message sent and DVM left.  Patient will continue weekly maintenance dose. INR is therapeutic.   Recheck in 6 weeks.         Clinical Outcomes     Negatives:  Major bleeding event, Thromboembolic event, Anticoagulation-related hospital admission, Anticoagulation-related ED visit, Anticoagulation-related fatality    Comments:  My chart message sent and DVM left.  Patient will continue weekly maintenance dose. INR is therapeutic.   Recheck in 6 weeks.            OBJECTIVE    Recent labs: (last 7 days)     10/23/20  1526   INR 2.60*       ASSESSMENT / PLAN  INR assessment THER    Recheck INR In: 6 WEEKS    INR Location Outside lab      Anticoagulation Summary  As of 10/23/2020    INR goal:  2.0-3.0   TTR:  96.6 % (1 y)   INR used for dosin.60 (10/23/2020)   Warfarin maintenance plan:  2.5 mg (5 mg x 0.5) every Mon, Fri; 5 mg (5 mg x 1) all other days   Full warfarin instructions:  2.5 mg every Mon, Fri; 5 mg all other days   Weekly warfarin total:  30 mg   No change documented:  Christian Cochran RN   Plan last modified:  Brandy Bose RN (10/22/2019)   Next INR check:  2020   Priority:  Maintenance   Target end date:  Indefinite    Indications    Long term current use of anticoagulant [Z79.01]  Cerebral artery occlusion with cerebral infarction (H) (Resolved) [I63.50]  Primary hypercoagulable state (H) [D68.59]             Anticoagulation Episode Summary     INR check location:      Preferred lab:      Send INR reminders to:  LACHELLE BORREGO    Comments:  * CVA/TIA  (carotid artery), also FVL (hetero). patient frequently misses doses when out of town        Anticoagulation Care Providers     Provider Role Specialty Phone number    Karina Shah,  Referring Internal Medicine  [phone]            See the Encounter Report to view Anticoagulation Flowsheet and Dosing Calendar (Go to Encounters tab in chart review, and find the Anticoagulation Therapy Visit)        Christian Cochran RN

## 2020-12-04 ENCOUNTER — TELEPHONE (OUTPATIENT)
Dept: FAMILY MEDICINE | Facility: CLINIC | Age: 45
End: 2020-12-04

## 2020-12-04 DIAGNOSIS — Z86.73 HISTORY OF STROKE: ICD-10-CM

## 2020-12-04 NOTE — TELEPHONE ENCOUNTER
Requested Prescriptions   Pending Prescriptions Disp Refills     atorvastatin (LIPITOR) 40 MG tablet [Pharmacy Med Name: ATORVASTATIN CALCIUM 40MG TABS] 30 tablet 0     Sig: TAKE ONE TABLET BY MOUTH ONCE DAILY       Statins Protocol Passed - 12/4/2020  7:27 AM        Passed - LDL on file in past 12 months     Recent Labs   Lab Test 05/29/20  1554   LDL 84           Passed - No abnormal creatine kinase in past 12 months     No lab results found.           Passed - Recent (12 mo) or future (30 days) visit within the authorizing provider's specialty             Passed - Medication is active on med list        Passed - Patient is age 18 or older        Passed - No active pregnancy on record        Passed - No positive pregnancy test in past 12 months

## 2020-12-07 NOTE — TELEPHONE ENCOUNTER
[employer] message was sent to pot 10/20/2020 - has not been read.  I left a message for the patient to return my call.    CSS please let pt know she is due NOW for Physical & PAP.    Nidhi GONZALEZ RN, BSN

## 2020-12-08 RX ORDER — ATORVASTATIN CALCIUM 40 MG/1
TABLET, FILM COATED ORAL
Qty: 30 TABLET | Refills: 0 | Status: SHIPPED | OUTPATIENT
Start: 2020-12-08 | End: 2020-12-18

## 2020-12-08 NOTE — TELEPHONE ENCOUNTER
Message left for patient to check the pharmacy.  Small amt refilled needs physical with pap for further refills. Vilma GREENE RN

## 2020-12-18 ENCOUNTER — VIRTUAL VISIT (OUTPATIENT)
Dept: FAMILY MEDICINE | Facility: CLINIC | Age: 45
End: 2020-12-18
Payer: COMMERCIAL

## 2020-12-18 DIAGNOSIS — E78.5 HYPERLIPIDEMIA LDL GOAL <70: Primary | ICD-10-CM

## 2020-12-18 DIAGNOSIS — E55.9 VITAMIN D DEFICIENCY: ICD-10-CM

## 2020-12-18 DIAGNOSIS — Z86.73 HISTORY OF STROKE: ICD-10-CM

## 2020-12-18 DIAGNOSIS — Z11.4 SCREENING FOR HIV WITHOUT PRESENCE OF RISK FACTORS: ICD-10-CM

## 2020-12-18 DIAGNOSIS — D68.59 PRIMARY HYPERCOAGULABLE STATE (H): ICD-10-CM

## 2020-12-18 DIAGNOSIS — E03.8 SUBCLINICAL HYPOTHYROIDISM: ICD-10-CM

## 2020-12-18 DIAGNOSIS — Z12.31 VISIT FOR SCREENING MAMMOGRAM: ICD-10-CM

## 2020-12-18 DIAGNOSIS — Z11.59 ENCOUNTER FOR HEPATITIS C SCREENING TEST FOR LOW RISK PATIENT: ICD-10-CM

## 2020-12-18 DIAGNOSIS — I77.71 CAROTID ARTERY DISSECTION (H): ICD-10-CM

## 2020-12-18 PROCEDURE — 99214 OFFICE O/P EST MOD 30 MIN: CPT | Mod: 95 | Performed by: INTERNAL MEDICINE

## 2020-12-18 RX ORDER — ATORVASTATIN CALCIUM 40 MG/1
40 TABLET, FILM COATED ORAL DAILY
Qty: 90 TABLET | Refills: 3 | Status: SHIPPED | OUTPATIENT
Start: 2020-12-18 | End: 2022-01-13

## 2020-12-18 NOTE — PROGRESS NOTES
"Belkis Christopher is a 45 year old female who is being evaluated via a billable video visit.      The patient has been notified of following:     \"This video visit will be conducted via a call between you and your physician/provider. We have found that certain health care needs can be provided without the need for an in-person physical exam.  This service lets us provide the care you need with a video conversation.  If a prescription is necessary we can send it directly to your pharmacy.  If lab work is needed we can place an order for that and you can then stop by our lab to have the test done at a later time.    Video visits are billed at different rates depending on your insurance coverage.  Please reach out to your insurance provider with any questions.    If during the course of the call the physician/provider feels a video visit is not appropriate, you will not be charged for this service.\"    Patient has given verbal consent for Video visit? Yes  How would you like to obtain your AVS? MyChart  If you are dropped from the video visit, the video invite should be resent to: Send to e-mail at: [email or web address] -   Will anyone else be joining your video visit? No      Subjective     Belkis Christopher is a 45 year old female who presents today via video visit for the following health issues:    HPI     Hyperlipidemia Follow-Up      Are you regularly taking any medication or supplement to lower your cholesterol?   Yes- atorvastatin 40 mg    Are you having muscle aches or other side effects that you think could be caused by your cholesterol lowering medication?  No    Vitamin d def: taking 5000 units day.       Video Start Time: 2:47 PM        Review of Systems   Constitutional, HEENT, cardiovascular, pulmonary, gi and gu systems are negative, except as otherwise noted.      Objective           Vitals:  No vitals were obtained today due to virtual visit.    Physical Exam     GENERAL: Healthy, alert and no " Patient distress  EYES: Eyes grossly normal to inspection.  No discharge or erythema, or obvious scleral/conjunctival abnormalities.  RESP: No audible wheeze, cough, or visible cyanosis.  No visible retractions or increased work of breathing.    SKIN: Visible skin clear. No significant rash, abnormal pigmentation or lesions.  NEURO: Cranial nerves grossly intact.  Mentation and speech appropriate for age.  PSYCH: Mentation appears normal, affect normal/bright, judgement and insight intact, normal speech and appearance well-groomed.            Assessment & Plan     Belkis was seen today for hyperlipidemia.    Diagnoses and all orders for this visit:    Hyperlipidemia LDL goal <70  -     Lipid panel reflex to direct LDL Fasting; Future    History of stroke  -     atorvastatin (LIPITOR) 40 MG tablet; Take 1 tablet (40 mg) by mouth daily    Carotid artery dissection (H)    Primary hypercoagulable state (H)  -     **Basic metabolic panel FUTURE anytime; Future  -     **CBC with platelets FUTURE anytime; Future    Subclinical hypothyroidism    Vitamin D deficiency  -     **Vitamin D Deficiency FUTURE anytime; Future    Encounter for hepatitis C screening test for low risk patient  -     **Hepatitis C Screen Reflex to RNA FUTURE anytime; Future    Screening for HIV without presence of risk factors  -     **HIV Antigen Antibody Combo FUTURE anytime; Future    Visit for screening mammogram  -     *MA Screening Digital Bilateral; Future                Patient Instructions   1. You are due for a mammogram.  Please call [phone] to schedule.  2. Refill given  3. Order for fasting blood work  4. Due for pap next year        COVID Vaccine:  --I strongly encourage you to get a COVID vaccine when it is available to you.    --I plan to receive the vaccine as soon as it is available to me  --The COVID vaccine is safe.  Most serious adverse reactions happen within the first few days or weeks.  Your chance of having a serious complication  related to COVID is much higher than having a serious adverse reaction to the vaccine.  --Many people will have low grade fevers, general body aches and fatigue for 1-2 days after getting the vaccine.  This is a sign that your immune system is activated - this is a good thing!  --If you have an allergy to Miralax, you should not get the vaccine.    --Stopping a pandemic requires all of us to do our part. Getting vaccinated is another step you can take to help reduce your chance of being exposed to the virus and spreading it to others. Together, the COVID-19 vaccination and following CDC's recommendations to protect yourself and others will offer the best protection from COVID-19. Wear a mask, wash your hands, stay at least 6 feet from others, and remain home if you're sick.  --If you encounter information about the vaccine that you would like further clarification on, please reach out to my team!  Send me a My Chart message or make an appointment with me.          No follow-ups on file.    Karina Shah DO  [facility]      Video-Visit Details    Type of service:  Video Visit    Video End Time:3:02    Originating Location (pt. Location): Home    Distant Location (provider location):  [facility]     Platform used for Video Visit: Jaclyn

## 2020-12-18 NOTE — PATIENT INSTRUCTIONS
1. You are due for a mammogram.  Please call [phone] to schedule.  2. Refill given  3. Order for fasting blood work  4. Due for pap next year        COVID Vaccine:  --I strongly encourage you to get a COVID vaccine when it is available to you.    --I plan to receive the vaccine as soon as it is available to me  --The COVID vaccine is safe.  Most serious adverse reactions happen within the first few days or weeks.  Your chance of having a serious complication related to COVID is much higher than having a serious adverse reaction to the vaccine.  --Many people will have low grade fevers, general body aches and fatigue for 1-2 days after getting the vaccine.  This is a sign that your immune system is activated - this is a good thing!  --If you have an allergy to Miralax, you should not get the vaccine.    --Stopping a pandemic requires all of us to do our part. Getting vaccinated is another step you can take to help reduce your chance of being exposed to the virus and spreading it to others. Together, the COVID-19 vaccination and following CDC's recommendations to protect yourself and others will offer the best protection from COVID-19. Wear a mask, wash your hands, stay at least 6 feet from others, and remain home if you're sick.  --If you encounter information about the vaccine that you would like further clarification on, please reach out to my team!  Send me a My Chart message or make an appointment with me.

## 2020-12-21 ENCOUNTER — ANTICOAGULATION THERAPY VISIT (OUTPATIENT)
Dept: INTERNAL MEDICINE | Facility: CLINIC | Age: 45
End: 2020-12-21

## 2020-12-21 DIAGNOSIS — Z11.59 ENCOUNTER FOR HEPATITIS C SCREENING TEST FOR LOW RISK PATIENT: ICD-10-CM

## 2020-12-21 DIAGNOSIS — D68.59 PRIMARY HYPERCOAGULABLE STATE (H): ICD-10-CM

## 2020-12-21 DIAGNOSIS — E78.5 HYPERLIPIDEMIA LDL GOAL <70: ICD-10-CM

## 2020-12-21 DIAGNOSIS — Z79.01 LONG TERM CURRENT USE OF ANTICOAGULANT: ICD-10-CM

## 2020-12-21 DIAGNOSIS — Z11.4 SCREENING FOR HIV WITHOUT PRESENCE OF RISK FACTORS: ICD-10-CM

## 2020-12-21 DIAGNOSIS — E55.9 VITAMIN D DEFICIENCY: ICD-10-CM

## 2020-12-21 LAB
ANION GAP SERPL CALCULATED.3IONS-SCNC: 7 MMOL/L (ref 3–14)
BUN SERPL-MCNC: 15 MG/DL (ref 7–30)
CALCIUM SERPL-MCNC: 8.8 MG/DL (ref 8.5–10.1)
CHLORIDE SERPL-SCNC: 101 MMOL/L (ref 94–109)
CHOLEST SERPL-MCNC: 195 MG/DL
CO2 SERPL-SCNC: 28 MMOL/L (ref 20–32)
CREAT SERPL-MCNC: 0.72 MG/DL (ref 0.52–1.04)
ERYTHROCYTE [DISTWIDTH] IN BLOOD BY AUTOMATED COUNT: 14.4 % (ref 10–15)
GFR SERPL CREATININE-BSD FRML MDRD: >90 ML/MIN/{1.73_M2}
GLUCOSE SERPL-MCNC: 87 MG/DL (ref 70–99)
HCT VFR BLD AUTO: 40 % (ref 35–47)
HDLC SERPL-MCNC: 58 MG/DL
HGB BLD-MCNC: 13.3 G/DL (ref 11.7–15.7)
INR PPP: 1.8 (ref 0.86–1.14)
LDLC SERPL CALC-MCNC: 111 MG/DL
MCH RBC QN AUTO: 29.2 PG (ref 26.5–33)
MCHC RBC AUTO-ENTMCNC: 33.3 G/DL (ref 31.5–36.5)
MCV RBC AUTO: 88 FL (ref 78–100)
NONHDLC SERPL-MCNC: 137 MG/DL
PLATELET # BLD AUTO: 296 10E9/L (ref 150–450)
POTASSIUM SERPL-SCNC: 3.7 MMOL/L (ref 3.4–5.3)
RBC # BLD AUTO: 4.55 10E12/L (ref 3.8–5.2)
SODIUM SERPL-SCNC: 136 MMOL/L (ref 133–144)
TRIGL SERPL-MCNC: 130 MG/DL
WBC # BLD AUTO: 7.1 10E9/L (ref 4–11)

## 2020-12-21 PROCEDURE — 80048 BASIC METABOLIC PNL TOTAL CA: CPT | Performed by: INTERNAL MEDICINE

## 2020-12-21 PROCEDURE — 87389 HIV-1 AG W/HIV-1&-2 AB AG IA: CPT | Performed by: INTERNAL MEDICINE

## 2020-12-21 PROCEDURE — 86803 HEPATITIS C AB TEST: CPT | Performed by: INTERNAL MEDICINE

## 2020-12-21 PROCEDURE — 85610 PROTHROMBIN TIME: CPT | Performed by: INTERNAL MEDICINE

## 2020-12-21 PROCEDURE — 85027 COMPLETE CBC AUTOMATED: CPT | Performed by: INTERNAL MEDICINE

## 2020-12-21 PROCEDURE — 80061 LIPID PANEL: CPT | Performed by: INTERNAL MEDICINE

## 2020-12-21 PROCEDURE — 82306 VITAMIN D 25 HYDROXY: CPT | Performed by: INTERNAL MEDICINE

## 2020-12-21 PROCEDURE — 36415 COLL VENOUS BLD VENIPUNCTURE: CPT | Performed by: INTERNAL MEDICINE

## 2020-12-21 NOTE — PROGRESS NOTES
Sent INR assessment questions to patient's MyChart as requested.    Anat Cat RN on 12/21/2020 at 12:22 PM

## 2020-12-21 NOTE — PROGRESS NOTES
Second MYChart sent with instructions to take 5 mg tonight and respond to MyChart message or call back to complete [facility] assessment.    Anat Cat RN on 12/21/2020 at 4:27 PM

## 2020-12-22 LAB
DEPRECATED CALCIDIOL+CALCIFEROL SERPL-MC: 47 UG/L (ref 20–75)
HCV AB SERPL QL IA: NONREACTIVE
HIV 1+2 AB+HIV1 P24 AG SERPL QL IA: NONREACTIVE

## 2020-12-22 NOTE — PROGRESS NOTES
ANTICOAGULATION MANAGEMENT     Patient Name:  Belkis Christopher  Date:  2020    ASSESSMENT /SUBJECTIVE:    Yesterday's INR result of 1.80 is subtherapeutic. Goal INR of 2.0-3.0        Previous INR: Therapeutic 2.60      Additional findings: [employer] message sent to patient with dosing instructions and recommended recheck date. Requested patient return call to [facility] to report any missed doses of warfarin, changes to diet, medications, health or activity. Requested patient call back to report any concerns with bleeding, increased bruising or signs/symptoms of a blood clot and to schedule next appointment. Also left a message on patient's phone to check her mychart.        PLAN:    Corresponded via [employer] regarding INR result and instructed:     Warfarin Dosing Instructions: Continue your current warfarin dose 2.5 mg every Mon, Fri; 5 mg all other days (patient was sent a message yesterday instructing her to take a booster dose of 5 mg)    Instructed patient to follow up no later than: 2 weeks  Left detailed message with recommended recheck date    Education provided: Please call back if any changes to your diet, medications or how you've been taking warfarin      Instructed to call the Anticoagulation Clinic for any changes, questions or concerns. (#[phone])        Marii Temple RN      OBJECTIVE:  Recent labs: (last 7 days)     20  1033   INR 1.80*         No question data found.  Anticoagulation Summary  As of 2020    INR goal:  2.0-3.0   TTR:  93.0 % (1 y)   INR used for dosin.80 (2020)   Warfarin maintenance plan:  2.5 mg (5 mg x 0.5) every Mon, Fri; 5 mg (5 mg x 1) all other days   Full warfarin instructions:  : 5 mg; Otherwise 2.5 mg every Mon, Fri; 5 mg all other days   Weekly warfarin total:  30 mg   Plan last modified:  Brandy Bose RN (10/22/2019)   Next INR check:  2021   Priority:  Maintenance   Target end date:  Indefinite    Indications    Long term  current use of anticoagulant [Z79.01]  Cerebral artery occlusion with cerebral infarction (H) (Resolved) [I63.50]  Primary hypercoagulable state (H) [D68.59]             Anticoagulation Episode Summary     INR check location:      Preferred lab:      Send INR reminders to:  LACHELLE BORREGO    Comments:  * CVA/TIA 2000 (carotid artery), also FVL (hetero). patient frequently misses doses when out of town        Anticoagulation Care Providers     Provider Role Specialty Phone number    Karina Shah DO Referring Internal Medicine [phone]

## 2020-12-24 NOTE — PROGRESS NOTES
VM left for pt to return call to [facility] - [phone]. Dosing instructions not given to pt - informed [employer] message was also sent to her previously.  Closing encounter as several attempts have been made.  Vira Gan RN on 12/24/2020 at 8:54 AM

## 2021-01-11 ENCOUNTER — TELEPHONE (OUTPATIENT)
Dept: ANTICOAGULATION | Facility: CLINIC | Age: 46
End: 2021-01-11

## 2021-01-11 NOTE — TELEPHONE ENCOUNTER
ANTICOAGULATION     Belkis Christopher is overdue for INR check.      Left message for patient to call and schedule lab appointment as soon as possible. If returning call, please schedule.     Vira Gan RN on 1/11/2021 at 10:59 AM

## 2021-01-19 NOTE — TELEPHONE ENCOUNTER
ANTICOAGULATION     Belkis Christopher is overdue for INR check.      Left message for patient to call and schedule lab appointment as soon as possible. If returning call, please schedule. She has not checked any of her [employer] messages.    Deb Roman RN

## 2021-01-22 DIAGNOSIS — D68.59 PRIMARY HYPERCOAGULABLE STATE (H): ICD-10-CM

## 2021-01-22 LAB
CAPILLARY BLOOD COLLECTION: NORMAL
INR PPP: 3.5 (ref 0.86–1.14)

## 2021-01-22 PROCEDURE — 36416 COLLJ CAPILLARY BLOOD SPEC: CPT | Performed by: INTERNAL MEDICINE

## 2021-01-22 PROCEDURE — 85610 PROTHROMBIN TIME: CPT | Performed by: INTERNAL MEDICINE

## 2021-01-25 ENCOUNTER — ANTICOAGULATION THERAPY VISIT (OUTPATIENT)
Dept: ANTICOAGULATION | Facility: CLINIC | Age: 46
End: 2021-01-25

## 2021-01-25 DIAGNOSIS — Z79.01 LONG TERM CURRENT USE OF ANTICOAGULANT: ICD-10-CM

## 2021-01-25 DIAGNOSIS — D68.59 PRIMARY HYPERCOAGULABLE STATE (H): ICD-10-CM

## 2021-01-25 NOTE — PROGRESS NOTES
ANTICOAGULATION MANAGEMENT     Patient Name:  Belkis Christopher  Date:  1/25/2021    ASSESSMENT /SUBJECTIVE:     INR result on 1/22 of 3.50 is supratherapeutic. Goal INR of 2.0-3.0      Previous INR: Subtherapeutic 1.80    Additional findings: [employer] message sent to patient with dosing instructions and recommended recheck date. Requested patient return call to [facility] to report any missed doses of warfarin, changes to diet, medications, health or activity. Requested patient call back to report any concerns with bleeding, increased bruising or signs/symptoms of a blood clot and to schedule next appointment.       PLAN:    Corresponded via [employer] regarding INR result and instructed:     Warfarin Dosing Instructions: Hold dose today then continue your current warfarin dose of 2.5 mg every Mon, Fri; 5 mg all other days    Instructed patient to follow up no later than: 2 weeks  Left detailed message with recommended recheck date    Education provided: Please call back if any changes to your diet, medications or how you've been taking warfarin      Instructed to call the Anticoagulation Clinic for any changes, questions or concerns. (#[phone])        Marii Temple RN      OBJECTIVE:  Recent labs: (last 7 days)     01/22/21  1840   INR 3.50*         No question data found.  Anticoagulation Summary  As of 1/25/2021    INR goal:  2.0-3.0   TTR:  89.3 % (1 y)   INR used for dosing:  3.50 (1/22/2021)   Warfarin maintenance plan:  2.5 mg (5 mg x 0.5) every Mon, Fri; 5 mg (5 mg x 1) all other days   Full warfarin instructions:  1/25: Hold; Otherwise 2.5 mg every Mon, Fri; 5 mg all other days   Weekly warfarin total:  30 mg   Plan last modified:  Brandy Bose RN (10/22/2019)   Next INR check:  2/5/2021   Priority:  Maintenance   Target end date:  Indefinite    Indications    Long term current use of anticoagulant [Z79.01]  Cerebral artery occlusion with cerebral infarction (H) (Resolved) [I63.50]  Primary  hypercoagulable state (H) [D68.59]             Anticoagulation Episode Summary     INR check location:      Preferred lab:      Send INR reminders to:  LACHELLE BORREGO    Comments:  * CVA/TIA 2000 (carotid artery), also FVL (hetero). patient frequently misses doses when out of town        Anticoagulation Care Providers     Provider Role Specialty Phone number    Karina Shah DO Referring Internal Medicine [phone]

## 2021-01-26 NOTE — PROGRESS NOTES
Second attempt to reach patient. Mychart message (patient's preferred method of contact) has not been read. Voicemail left for patient requesting return call to [facility] or to read and respond to the [employer] message that was sent to her.     Marii Temple RN 01/26/21 at 10:03 AM

## 2021-01-27 NOTE — PROGRESS NOTES
01/27/21    Writer left another VM requesting the patient read the my chart message and return a call to [facility] to discuss the elevated INR.    Christian Cochran, RN, BSN, PHN  Anticoagulation Clinic   [phone]         Vital Signs Last 24 Hrs  T(C): 37.8 (10 Marcus 2019 23:56), Max: 37.8 (10 Marcus 2019 23:56)  T(F): 100.1 (10 Marcus 2019 23:56), Max: 100.1 (10 Marcus 2019 23:56)  HR: 103 (10 Marcus 2019 23:56) (93 - 103)  BP: 162/71 (10 Marcus 2019 23:56) (162/71 - 178/88)  BP(mean): --  RR: 17 (10 Marcus 2019 23:56) (17 - 18)  SpO2: 97% (10 Marcus 2019 23:56) (97% - 99%)                            12.6   11.75 )-----------( 266      ( 10 Marcus 2019 19:37 )             41.0     01-10    140  |  104  |  15  ----------------------------<  120<H>  4.1   |  22  |  0.98    Ca    9.6      10 Marcus 2019 19:37    TPro  8.0  /  Alb  4.4  /  TBili  0.4  /  DBili  x   /  AST  19  /  ALT  15  /  AlkPhos  98  01-10    CAPILLARY BLOOD GLUCOSE

## 2021-01-28 NOTE — PROGRESS NOTES
3 attempts made to reach patient over 3 different days. She has not called [facility] back or read her [employer] message. Writer will close this encounter and notify PCP that patient is not responding.    Deb Roman RN, BSN, PHN  1-

## 2021-02-03 ENCOUNTER — ANCILLARY PROCEDURE (OUTPATIENT)
Dept: MAMMOGRAPHY | Facility: CLINIC | Age: 46
End: 2021-02-03
Attending: INTERNAL MEDICINE
Payer: COMMERCIAL

## 2021-02-03 DIAGNOSIS — Z12.31 VISIT FOR SCREENING MAMMOGRAM: ICD-10-CM

## 2021-02-03 PROCEDURE — 77067 SCR MAMMO BI INCL CAD: CPT | Mod: TC | Performed by: RADIOLOGY

## 2021-02-03 PROCEDURE — 77063 BREAST TOMOSYNTHESIS BI: CPT | Mod: TC | Performed by: RADIOLOGY

## 2021-02-19 ENCOUNTER — ANTICOAGULATION THERAPY VISIT (OUTPATIENT)
Dept: ANTICOAGULATION | Facility: CLINIC | Age: 46
End: 2021-02-19

## 2021-02-19 DIAGNOSIS — D68.59 PRIMARY HYPERCOAGULABLE STATE (H): ICD-10-CM

## 2021-02-19 DIAGNOSIS — Z79.01 LONG TERM CURRENT USE OF ANTICOAGULANT: ICD-10-CM

## 2021-02-19 LAB
CAPILLARY BLOOD COLLECTION: NORMAL
INR PPP: 3 (ref 0.86–1.14)

## 2021-02-19 PROCEDURE — 36416 COLLJ CAPILLARY BLOOD SPEC: CPT | Performed by: INTERNAL MEDICINE

## 2021-02-19 PROCEDURE — 85610 PROTHROMBIN TIME: CPT | Performed by: INTERNAL MEDICINE

## 2021-02-19 NOTE — PROGRESS NOTES
Anticoagulation Management    Unable to reach Belkis today.    Today's INR result of 3.00 is therapeutic (goal INR of 2.0-3.0).  Result received from: Clinic Lab    Follow up required to confirm warfarin dose taken and assess for changes    No instructions provided on voicemail, requested a call back or for patient to review [employer] message.    Tentative plan: Continue 2.5mg Mon/Fri; 5mg all other days. Recheck the INR again in ~3 weeks    For callbacks transfer to [phone] until 5 PM.      Anticoagulation clinic to follow up    Brandy Bose RN CACP

## 2021-02-23 NOTE — PROGRESS NOTES
4:11 PM     3rd attempt. No answer. Left VM requesting the patient either call back or respond via my chart to verify she received the dosing instructions.    02/23/21 12:19 PM    2nd attempt. Phone line busy. Unable to leave a message.    Christian Cochran, RN, BSN, PHN  Anticoagulation Clinic   [phone]

## 2021-02-26 NOTE — PROGRESS NOTES
02/26/21  Confirmed patient read the message on [employer]. She responded stating she will set up another INR check around March 12th.    Dago RODARTE RN, CACP

## 2021-03-18 ENCOUNTER — ANTICOAGULATION THERAPY VISIT (OUTPATIENT)
Dept: ANTICOAGULATION | Facility: CLINIC | Age: 46
End: 2021-03-18

## 2021-03-18 DIAGNOSIS — D68.59 PRIMARY HYPERCOAGULABLE STATE (H): ICD-10-CM

## 2021-03-18 DIAGNOSIS — Z79.01 LONG TERM CURRENT USE OF ANTICOAGULANT: ICD-10-CM

## 2021-03-18 LAB
CAPILLARY BLOOD COLLECTION: NORMAL
INR PPP: 2.2 (ref 0.86–1.14)

## 2021-03-18 PROCEDURE — 85610 PROTHROMBIN TIME: CPT | Performed by: INTERNAL MEDICINE

## 2021-03-18 PROCEDURE — 36416 COLLJ CAPILLARY BLOOD SPEC: CPT | Performed by: INTERNAL MEDICINE

## 2021-03-18 NOTE — PROGRESS NOTES
ANTICOAGULATION MANAGEMENT     Patient Name:  Belkis Christopher  Date:  3/18/2021    ASSESSMENT /SUBJECTIVE:    Today's INR result of 2.20 is therapeutic. Goal INR of 2.0-3.0      Previous INR: Therapeutic 3.00    Additional findings: [email or web address] message sent to patient with dosing instructions and recommended recheck date. Requested patient return call to [facility] to report any missed doses of warfarin, changes to diet, medications, health or activity. Requested patient call back to report any concerns with bleeding, increased bruising or signs/symptoms of a blood clot and to schedule next appointment.       PLAN:    Corresponded via [email or web address] regarding INR result and instructed:     Warfarin Dosing Instructions: Continue your current warfarin dose 2.5 mg every Mon, Fri; 5 mg all other days    Instructed patient to follow up no later than: 4 weeks  Left detailed message with recommended recheck date    Education provided: Please call back if any changes to your diet, medications or how you've been taking warfarin      Instructed to call the Anticoagulation Clinic for any changes, questions or concerns. (#[phone])        Marii Temple RN      OBJECTIVE:  Recent labs: (last 7 days)     21  1623   INR 2.20*         No question data found.  Anticoagulation Summary  As of 3/18/2021    INR goal:  2.0-3.0   TTR:  81.7 % (1 y)   INR used for dosin.20 (3/18/2021)   Warfarin maintenance plan:  2.5 mg (5 mg x 0.5) every Mon, Fri; 5 mg (5 mg x 1) all other days   Full warfarin instructions:  2.5 mg every Mon, Fri; 5 mg all other days   Weekly warfarin total:  30 mg   No change documented:  Marii Temple RN   Plan last modified:  Brandy Bose RN (10/22/2019)   Next INR check:  4/15/2021   Priority:  Maintenance   Target end date:  Indefinite    Indications    Long term current use of anticoagulant [Z79.01]  Cerebral artery occlusion with cerebral infarction (H) (Resolved) [I63.50]  Primary  hypercoagulable state (H) [D68.59]             Anticoagulation Episode Summary     INR check location:      Preferred lab:      Send INR reminders to:  LACHELLE BORREGO    Comments:  * CVA/TIA 2000 (carotid artery), also FVL (hetero). patient frequently misses doses when out of town        Anticoagulation Care Providers     Provider Role Specialty Phone number    Karina Shah DO Referring Internal Medicine [phone]

## 2021-03-19 NOTE — PROGRESS NOTES
ANTICOAGULATION FOLLOW-UP CLINIC VISIT    Patient Name:  Belkis Christopher  Date:  3/19/2021  Contact Type:  Telephone    SUBJECTIVE:  Patient Findings     Comments:  Patient will continue weekly maintenance dose. INR is therapeutic.   Recheck in 4 weeks.   Patient will call back to schedule the INR.  Patient verbalizes understanding and agrees to plan. No further questions or concerns.          Clinical Outcomes     Negatives:  Major bleeding event, Thromboembolic event, Anticoagulation-related hospital admission, Anticoagulation-related ED visit, Anticoagulation-related fatality    Comments:  Patient will continue weekly maintenance dose. INR is therapeutic.   Recheck in 4 weeks.   Patient will call back to schedule the INR.  Patient verbalizes understanding and agrees to plan. No further questions or concerns.             OBJECTIVE    Recent labs: (last 7 days)     21  1623   INR 2.20*       ASSESSMENT / PLAN  INR assessment THER    Recheck INR In: 4 WEEKS    INR Location Clinic      Anticoagulation Summary  As of 3/18/2021    INR goal:  2.0-3.0   TTR:  81.7 % (1 y)   INR used for dosin.20 (3/18/2021)   Warfarin maintenance plan:  2.5 mg (5 mg x 0.5) every Mon, Fri; 5 mg (5 mg x 1) all other days   Full warfarin instructions:  2.5 mg every Mon, Fri; 5 mg all other days   Weekly warfarin total:  30 mg   No change documented:  Marii Temple RN   Plan last modified:  Brandy Bose RN (10/22/2019)   Next INR check:  4/15/2021   Priority:  Maintenance   Target end date:  Indefinite    Indications    Long term current use of anticoagulant [Z79.01]  Cerebral artery occlusion with cerebral infarction (H) (Resolved) [I63.50]  Primary hypercoagulable state (H) [D68.59]             Anticoagulation Episode Summary     INR check location:      Preferred lab:      Send INR reminders to:  LACHELLE BORREGO    Comments:  * CVA/TIA  (carotid artery), also FVL (hetero). patient frequently misses doses  when out of town        Anticoagulation Care Providers     Provider Role Specialty Phone number    Karina Shah DO Referring Internal Medicine [phone]            See the Encounter Report to view Anticoagulation Flowsheet and Dosing Calendar (Go to Encounters tab in chart review, and find the Anticoagulation Therapy Visit)        Christian Cochran RN

## 2021-03-26 DIAGNOSIS — Z79.01 LONG TERM CURRENT USE OF ANTICOAGULANT: Primary | ICD-10-CM

## 2021-04-24 ENCOUNTER — HEALTH MAINTENANCE LETTER (OUTPATIENT)
Age: 46
End: 2021-04-24

## 2021-04-30 ENCOUNTER — ANTICOAGULATION THERAPY VISIT (OUTPATIENT)
Dept: ANTICOAGULATION | Facility: CLINIC | Age: 46
End: 2021-04-30

## 2021-04-30 DIAGNOSIS — Z79.01 LONG TERM CURRENT USE OF ANTICOAGULANT: ICD-10-CM

## 2021-04-30 DIAGNOSIS — D68.59 PRIMARY HYPERCOAGULABLE STATE (H): ICD-10-CM

## 2021-04-30 LAB
CAPILLARY BLOOD COLLECTION: NORMAL
INR PPP: 3 (ref 0.86–1.14)

## 2021-04-30 PROCEDURE — 85610 PROTHROMBIN TIME: CPT | Performed by: INTERNAL MEDICINE

## 2021-04-30 PROCEDURE — 36416 COLLJ CAPILLARY BLOOD SPEC: CPT | Performed by: INTERNAL MEDICINE

## 2021-04-30 NOTE — PROGRESS NOTES
Anticoagulation Management    Unable to reach Belkis today.    Today's INR result of 3.00 is therapeutic (goal INR of 2.0-3.0).  Result received from: Clinic Lab    Follow up required to confirm warfarin dose taken and assess for changes    Left voicemail for patient to call [facility] or check her [employer] message. If the message is reviewed, no need to callback.    Tentative plan: Continue 2.5mg (1/2 tab) Mon & Fri; 5mg (1 tab) all other days. Recheck the INR again in 4 weeks    For callbacks transfer to [phone] until 5 PM.      Anticoagulation clinic to follow up    Brandy Bose RN CACP

## 2021-05-02 DIAGNOSIS — Z79.01 LONG TERM CURRENT USE OF ANTICOAGULANT: ICD-10-CM

## 2021-05-04 RX ORDER — WARFARIN SODIUM 5 MG/1
TABLET ORAL
Qty: 90 TABLET | Refills: 1 | Status: SHIPPED | OUTPATIENT
Start: 2021-05-04 | End: 2022-01-13

## 2021-05-04 NOTE — TELEPHONE ENCOUNTER
Anticoagulation Monitoring Return Date Recheck   Latest Ref Rng & Units     4/30/2021 5/28/2021      Anticoagulation Monitoring Instructions   Latest Ref Rng & Units    4/30/2021 2.5 mg every Mon, Fri; 5 mg all other days   Prescription approved per [facility] Refill Protocol.  Zakia Sorensen, RN  Anticoagulation Nurse - [facility]

## 2021-06-02 ENCOUNTER — ANTICOAGULATION THERAPY VISIT (OUTPATIENT)
Dept: ANTICOAGULATION | Facility: CLINIC | Age: 46
End: 2021-06-02

## 2021-06-02 DIAGNOSIS — Z79.01 LONG TERM CURRENT USE OF ANTICOAGULANT: ICD-10-CM

## 2021-06-02 DIAGNOSIS — D68.59 PRIMARY HYPERCOAGULABLE STATE (H): ICD-10-CM

## 2021-06-02 LAB
CAPILLARY BLOOD COLLECTION: NORMAL
INR PPP: 2.8 (ref 0.86–1.14)

## 2021-06-02 PROCEDURE — 85610 PROTHROMBIN TIME: CPT | Performed by: INTERNAL MEDICINE

## 2021-06-02 PROCEDURE — 36416 COLLJ CAPILLARY BLOOD SPEC: CPT | Performed by: INTERNAL MEDICINE

## 2021-06-02 NOTE — PROGRESS NOTES
ANTICOAGULATION MANAGEMENT     Patient Name:  Belkis Christopher  Date:  2021    ASSESSMENT /SUBJECTIVE:    Today's INR result of 2.80 is therapeutic. Goal INR of 2.0-3.0        Previous INR: Therapeutic         PLAN:    Warfarin Dosing Instructions: Continue your current warfarin dose 2.5 mg Mon,Fri; 5 mg ROW.    Instructed patient to follow up no later than: 5 weeks  Left detailed message with recommended recheck date    Education provided: Please call back if any changes to your diet, medications or how you've been taking warfarin and Contact [facility] Anticoagulation: [phone]  with any changes, questions or concerns.     Sent [employer] message with dosing and follow up instructions    Instructed to call the Anticoagulation Clinic for any changes, questions or concerns. (#[phone])        Christian Cochran RN      OBJECTIVE:  Recent labs: (last 7 days)     21  1533   INR 2.80*         No question data found.  Anticoagulation Summary  As of 2021    INR goal:  2.0-3.0   TTR:  81.7 % (1 y)   INR used for dosin.80 (2021)   Warfarin maintenance plan:  2.5 mg (5 mg x 0.5) every Mon, Fri; 5 mg (5 mg x 1) all other days   Full warfarin instructions:  2.5 mg every Mon, Fri; 5 mg all other days   Weekly warfarin total:  30 mg   No change documented:  Christian Cochran, RN   Plan last modified:  Brandy Bose RN (10/22/2019)   Next INR check:  2021   Priority:  Maintenance   Target end date:  Indefinite    Indications    Long term current use of anticoagulant [Z79.01]  Cerebral artery occlusion with cerebral infarction (H) (Resolved) [I63.50]  Primary hypercoagulable state (H) [D68.59]             Anticoagulation Episode Summary     INR check location:      Preferred lab:      Send INR reminders to:  LACHELLE BORREGO    Comments:  * CVA/TIA  (carotid artery), also FVL (hetero). patient frequently misses doses when out of town        Anticoagulation Care  Providers     Provider Role Specialty Phone number    Karina Shah DO Referring Internal Medicine [phone]

## 2021-06-08 ENCOUNTER — OFFICE VISIT (OUTPATIENT)
Dept: DERMATOLOGY | Facility: CLINIC | Age: 46
End: 2021-06-08
Payer: COMMERCIAL

## 2021-06-08 VITALS — OXYGEN SATURATION: 98 % | DIASTOLIC BLOOD PRESSURE: 82 MMHG | HEART RATE: 75 BPM | SYSTOLIC BLOOD PRESSURE: 122 MMHG

## 2021-06-08 DIAGNOSIS — L81.4 LENTIGO: ICD-10-CM

## 2021-06-08 DIAGNOSIS — L91.8 SKIN TAG: ICD-10-CM

## 2021-06-08 DIAGNOSIS — D23.9 DERMAL NEVUS: ICD-10-CM

## 2021-06-08 DIAGNOSIS — D48.5 NEOPLASM OF UNCERTAIN BEHAVIOR OF SKIN: Primary | ICD-10-CM

## 2021-06-08 DIAGNOSIS — L82.1 SEBORRHEIC KERATOSIS: ICD-10-CM

## 2021-06-08 PROCEDURE — 99203 OFFICE O/P NEW LOW 30 MIN: CPT | Mod: 25 | Performed by: DERMATOLOGY

## 2021-06-08 PROCEDURE — 11102 TANGNTL BX SKIN SINGLE LES: CPT | Performed by: DERMATOLOGY

## 2021-06-08 PROCEDURE — 88305 TISSUE EXAM BY PATHOLOGIST: CPT | Performed by: DERMATOLOGY

## 2021-06-08 NOTE — PROGRESS NOTES
Belkis Christopher is an extremely pleasant 45 year old year old female patient here today for changnig mole on left neck.   .   Patient states this has been present for a while.  Patient reports the following symptoms:  Red and inflamed.  .  Patient reports the following previous treatments none.  These treatments did not work.  Patient reports the following modifying factors none.  Associated symptoms: none.  Patient has no other skin complaints today.  Remainder of the HPI, Meds, PMH, Allergies, FH, and SH was reviewed in chart.      Past Medical History:   Diagnosis Date     Factor 5 Leiden mutation, heterozygous (H)      High cholesterol      Obese      TIA (transient ischemic attack)     20years ago       Past Surgical History:   Procedure Laterality Date     DILATION AND CURETTAGE, HYSTEROSCOPY, ABLATE ENDOMETRIUM, COMBINED N/A 1/28/2019    Procedure: Hysteroscopy, endometrial sampling, endometrial ablation. Dee.;  Surgeon: Adis Brower MD;  Location: [place] OR     GYN SURGERY          Family History   Problem Relation Age of Onset     Hypertension Father        Social History     Socioeconomic History     Marital status:      Spouse name: Not on file     Number of children: Not on file     Years of education: Not on file     Highest education level: Not on file   Occupational History     Not on file   Social Needs     Financial resource strain: Not on file     Food insecurity     Worry: Not on file     Inability: Not on file     Transportation needs     Medical: Not on file     Non-medical: Not on file   Tobacco Use     Smoking status: Never Smoker     Smokeless tobacco: Never Used   Substance and Sexual Activity     Alcohol use: Yes     Comment: occ.     Drug use: No     Sexual activity: Yes     Partners: Male     Birth control/protection: Condom   Lifestyle     Physical activity     Days per week: Not on file     Minutes per session: Not on file     Stress: Not on file   Relationships      Social connections     Talks on phone: Not on file     Gets together: Not on file     Attends Mandaeism service: Not on file     Active member of club or organization: Not on file     Attends meetings of clubs or organizations: Not on file     Relationship status: Not on file     Intimate partner violence     Fear of current or ex partner: Not on file     Emotionally abused: Not on file     Physically abused: Not on file     Forced sexual activity: Not on file   Other Topics Concern     Parent/sibling w/ CABG, MI or angioplasty before 65F 55M? No   Social History Narrative     Not on file       Outpatient Encounter Medications as of 6/8/2021   Medication Sig Dispense Refill     atorvastatin (LIPITOR) 40 MG tablet Take 1 tablet (40 mg) by mouth daily 90 tablet 3     cholecalciferol (VITAMIN D3) 125 mcg (5000 units) capsule Take 5,000 mcg by mouth daily       coenzyme Q-10 200 MG CAPS Take 200 mg by mouth daily       warfarin ANTICOAGULANT (JANTOVEN ANTICOAGULANT) 5 MG tablet TAKE ONE-HALF TABLET (2.5MG) BY MOUTH ONCE DAILY ON MONDAY AND FRIDAY, AND TAKE ONE TABLET (5MG) ONCE DAILY ON ALL OTHER DAYS OF THE WEEK OR AS DIRECTED BY ANTICOAGULATION CLINIC 90 tablet 1     Ferrous Sulfate (IRON SUPPLEMENT PO)        [DISCONTINUED] ASPIRIN NOT PRESCRIBED, INTENTIONAL, 1 each daily Antiplatelet medication not prescribed intentionally due to Current anticoagulant therapy (warfarin/enoxaparin) 0 each 0     No facility-administered encounter medications on file as of 6/8/2021.              O:   NAD, WDWN, Alert & Oriented, Mood & Affect wnl, Vitals stable   Here today alone   /82   Pulse 75   SpO2 98%    General appearance normal   Vitals stable   Alert, oriented and in no acute distress        Stuck on papules and brown macules on trunk and ext   Tags in neck  L base of neck red 6mm firm papule  Flesh colored papules on trunk     The remainder of expanded problem focused exam was normal; the following areas were  examined:  conjunctiva/lids, face, neck, , chest, digits/nails, RUE, LUE.      Eyes: Conjunctivae/lids:Normal     ENT: Lips, buccal mucosa, tongue: normal    MSK:Normal    Cardiovascular: peripheral edema none    Pulm: Breathing Normal    Neuro/Psych: Orientation:Alert and Orientedx3 ; Mood/Affect:normal       A/P:  1. Seborrheic keratosis, lentigo, dermal nevus, skin tag  2. l neeck r/o atypical nevus  TANGENTIAL BIOPSY SENT OUT:  After consent, anesthesia with LEC and prep, tangential excision performed and specimen sent out for permanent section histology.  No complications and routine wound care. Patient told to call our office in 1-2 weeks for result.       It was a pleasure speaking to Belkis Christopher today.  Previous clinic notes and pertinent laboratory tests were reviewed prior to Belkis Christopher's visit.  Signs and Symptoms of skin cancer discussed with patient.  Patient encouraged to perform monthly skin exams.  UV precautions reviewed with patient.  Return to clinic pending path

## 2021-06-08 NOTE — NURSING NOTE
Chief Complaint   Patient presents with     Derm Problem       Vitals:    06/08/21 1259   BP: 122/82   Pulse: 75   SpO2: 98%     Wt Readings from Last 1 Encounters:   02/19/20 88.5 kg (195 lb)       Jazzmine Shah LPN.................6/8/2021

## 2021-06-08 NOTE — LETTER
6/8/2021         RE: Belkis Christopher  [address] [facility] [address]        Dear Colleague,    Thank you for referring your patient, Belkis Christopher, to the [facility]. Please see a copy of my visit note below.    Belkis Christopher is an extremely pleasant 45 year old year old female patient here today for changnig mole on left neck.   .   Patient states this has been present for a while.  Patient reports the following symptoms:  Red and inflamed.  .  Patient reports the following previous treatments none.  These treatments did not work.  Patient reports the following modifying factors none.  Associated symptoms: none.  Patient has no other skin complaints today.  Remainder of the HPI, Meds, PMH, Allergies, FH, and SH was reviewed in chart.      Past Medical History:   Diagnosis Date     Factor 5 Leiden mutation, heterozygous (H)      High cholesterol      Obese      TIA (transient ischemic attack)     20years ago       Past Surgical History:   Procedure Laterality Date     DILATION AND CURETTAGE, HYSTEROSCOPY, ABLATE ENDOMETRIUM, COMBINED N/A 1/28/2019    Procedure: Hysteroscopy, endometrial sampling, endometrial ablation. Dee.;  Surgeon: Adis Brower MD;  Location: [place] OR     GYN SURGERY          Family History   Problem Relation Age of Onset     Hypertension Father        Social History     Socioeconomic History     Marital status:      Spouse name: Not on file     Number of children: Not on file     Years of education: Not on file     Highest education level: Not on file   Occupational History     Not on file   Social Needs     Financial resource strain: Not on file     Food insecurity     Worry: Not on file     Inability: Not on file     Transportation needs     Medical: Not on file     Non-medical: Not on file   Tobacco Use     Smoking status: Never Smoker     Smokeless tobacco: Never Used   Substance and Sexual Activity     Alcohol use: Yes     Comment: occ.      Drug use: No     Sexual activity: Yes     Partners: Male     Birth control/protection: Condom   Lifestyle     Physical activity     Days per week: Not on file     Minutes per session: Not on file     Stress: Not on file   Relationships     Social connections     Talks on phone: Not on file     Gets together: Not on file     Attends Rastafari service: Not on file     Active member of club or organization: Not on file     Attends meetings of clubs or organizations: Not on file     Relationship status: Not on file     Intimate partner violence     Fear of current or ex partner: Not on file     Emotionally abused: Not on file     Physically abused: Not on file     Forced sexual activity: Not on file   Other Topics Concern     Parent/sibling w/ CABG, MI or angioplasty before 65F 55M? No   Social History Narrative     Not on file       Outpatient Encounter Medications as of 6/8/2021   Medication Sig Dispense Refill     atorvastatin (LIPITOR) 40 MG tablet Take 1 tablet (40 mg) by mouth daily 90 tablet 3     cholecalciferol (VITAMIN D3) 125 mcg (5000 units) capsule Take 5,000 mcg by mouth daily       coenzyme Q-10 200 MG CAPS Take 200 mg by mouth daily       warfarin ANTICOAGULANT (JANTOVEN ANTICOAGULANT) 5 MG tablet TAKE ONE-HALF TABLET (2.5MG) BY MOUTH ONCE DAILY ON MONDAY AND FRIDAY, AND TAKE ONE TABLET (5MG) ONCE DAILY ON ALL OTHER DAYS OF THE WEEK OR AS DIRECTED BY ANTICOAGULATION CLINIC 90 tablet 1     Ferrous Sulfate (IRON SUPPLEMENT PO)        [DISCONTINUED] ASPIRIN NOT PRESCRIBED, INTENTIONAL, 1 each daily Antiplatelet medication not prescribed intentionally due to Current anticoagulant therapy (warfarin/enoxaparin) 0 each 0     No facility-administered encounter medications on file as of 6/8/2021.              O:   NAD, WDWN, Alert & Oriented, Mood & Affect wnl, Vitals stable   Here today alone   /82   Pulse 75   SpO2 98%    General appearance normal   Vitals stable   Alert, oriented and in no acute  distress        Stuck on papules and brown macules on trunk and ext   Tags in neck  L base of neck red 6mm firm papule  Flesh colored papules on trunk     The remainder of expanded problem focused exam was normal; the following areas were examined:  conjunctiva/lids, face, neck, , chest, digits/nails, RUE, LUE.      Eyes: Conjunctivae/lids:Normal     ENT: Lips, buccal mucosa, tongue: normal    MSK:Normal    Cardiovascular: peripheral edema none    Pulm: Breathing Normal    Neuro/Psych: Orientation:Alert and Orientedx3 ; Mood/Affect:normal       A/P:  1. Seborrheic keratosis, lentigo, dermal nevus, skin tag  2. l neeck r/o atypical nevus  TANGENTIAL BIOPSY SENT OUT:  After consent, anesthesia with LEC and prep, tangential excision performed and specimen sent out for permanent section histology.  No complications and routine wound care. Patient told to call our office in 1-2 weeks for result.       It was a pleasure speaking to Belkis Christopher today.  Previous clinic notes and pertinent laboratory tests were reviewed prior to Belkis Christopher's visit.  Signs and Symptoms of skin cancer discussed with patient.  Patient encouraged to perform monthly skin exams.  UV precautions reviewed with patient.  Return to clinic pending path         Again, thank you for allowing me to participate in the care of your patient.        Sincerely,        Roge Nunez MD

## 2021-06-10 LAB — COPATH REPORT: NORMAL

## 2021-07-14 ENCOUNTER — MYC MEDICAL ADVICE (OUTPATIENT)
Dept: ANTICOAGULATION | Facility: CLINIC | Age: 46
End: 2021-07-14

## 2021-07-14 NOTE — TELEPHONE ENCOUNTER
ANTICOAGULATION     Belkis Christopher is overdue for INR check.      My chart reminder sent.    Christian Cochran RN

## 2021-07-28 ENCOUNTER — DOCUMENTATION ONLY (OUTPATIENT)
Dept: ANTICOAGULATION | Facility: CLINIC | Age: 46
End: 2021-07-28

## 2021-07-28 DIAGNOSIS — D68.59 PRIMARY HYPERCOAGULABLE STATE (H): Primary | ICD-10-CM

## 2021-07-29 ENCOUNTER — DOCUMENTATION ONLY (OUTPATIENT)
Dept: FAMILY MEDICINE | Facility: CLINIC | Age: 46
End: 2021-07-29

## 2021-07-29 ENCOUNTER — LAB (OUTPATIENT)
Dept: LAB | Facility: CLINIC | Age: 46
End: 2021-07-29
Payer: COMMERCIAL

## 2021-07-29 ENCOUNTER — ANTICOAGULATION THERAPY VISIT (OUTPATIENT)
Dept: ANTICOAGULATION | Facility: CLINIC | Age: 46
End: 2021-07-29

## 2021-07-29 DIAGNOSIS — Z79.01 LONG TERM CURRENT USE OF ANTICOAGULANT: ICD-10-CM

## 2021-07-29 DIAGNOSIS — D68.59 PRIMARY HYPERCOAGULABLE STATE (H): ICD-10-CM

## 2021-07-29 DIAGNOSIS — Z86.73 HISTORY OF STROKE: Primary | ICD-10-CM

## 2021-07-29 DIAGNOSIS — Z86.73 HISTORY OF STROKE: ICD-10-CM

## 2021-07-29 DIAGNOSIS — Z79.01 LONG TERM CURRENT USE OF ANTICOAGULANT: Primary | ICD-10-CM

## 2021-07-29 LAB — INR BLD: 1.9 (ref 0.9–1.1)

## 2021-07-29 PROCEDURE — 36416 COLLJ CAPILLARY BLOOD SPEC: CPT

## 2021-07-29 PROCEDURE — 85610 PROTHROMBIN TIME: CPT

## 2021-07-29 NOTE — PROGRESS NOTES
ANTICOAGULATION MANAGEMENT      Belkis Christopher due for annual renewal of referral to anticoagulation monitoring. Order pended for your review and signature.      ANTICOAGULATION SUMMARY      Warfarin indication(s)     Primary hypercoagulable state, cerebral atery occlusion with cerebral infarction    Heart valve present?  NO       Current goal range   INR: 2.0-3.0     Goal appropriate for indication? Yes, INR 2-3 appropriate for hx of DVT, PE, hypercoagulable state, Afib, LVAD, or bileaflet AVR without risk factors     Current duration of therapy Indefinite/long term therapy   Time in Therapeutic Range (TTR)  (Goal > 60%) 80.0%       Office visit with referring provider's group within last year yes on 12/18/2020 - virtual visit        Marii Temple RN

## 2021-07-29 NOTE — PROGRESS NOTES
ANTICOAGULATION MANAGEMENT     Belkis Christohper 45 year old female is on warfarin with subtherapeutic INR result. (Goal INR 2.0-3.0)    Recent labs: (last 7 days)     07/29/21  1605   INR 1.9*       ASSESSMENT     Source(s): Chart Review       Previous INR: Therapeutic last 2(+) visits    Additional findings: None     PLAN     Recommended plan for no diet, medication or health factor changes affecting INR     Dosing Instructions: Continue your current warfarin dose with next INR in 2 weeks       Summary  As of 7/29/2021    Full warfarin instructions:  2.5 mg every Mon, Fri; 5 mg all other days   Next INR check:  8/12/2021             Sent [employer] message with dosing and follow up instructions    Contact [phone]  to schedule and with any changes, questions or concerns.     Education provided: Please call back if any changes to your diet, medications or how you've been taking warfarin    Plan made per [facility] anticoagulation protocol    Marii Temple, RN  Anticoagulation Clinic  7/29/2021    _______________________________________________________________________     Anticoagulation Episode Summary     Current INR goal:  2.0-3.0   TTR:  80.0 % (1 y)   Target end date:  Indefinite   Send INR reminders to:  [facility]    Indications    Long term current use of anticoagulant [Z79.01]  Cerebral artery occlusion with cerebral infarction (H) (Resolved) [I63.50]  Primary hypercoagulable state (H) [D68.59]           Comments:  * CVA/TIA 2000 (carotid artery), also FVL (hetero). patient frequently misses doses when out of town           Anticoagulation Care Providers     Provider Role Specialty Phone number    Karina Shah DO Referring Internal Medicine [phone]

## 2021-08-03 NOTE — PROGRESS NOTES
2:16 PM    Writer left another VM requesting the patient to call back or read the my chart message.    Christian Cochran RN, BSN, PHN  Anticoagulation Clinic   [place] # [id number]  [phone]

## 2021-08-05 NOTE — PROGRESS NOTES
8/5 - VM left for pt to return call to [facility].  Vira Gan RN on 8/5/2021 at 2:09 PM

## 2021-08-19 ENCOUNTER — TELEPHONE (OUTPATIENT)
Dept: ANTICOAGULATION | Facility: CLINIC | Age: 46
End: 2021-08-19

## 2021-08-19 NOTE — TELEPHONE ENCOUNTER
ANTICOAGULATION     Belkis Christopher is overdue for INR check.      Left message for patient to call and schedule lab appointment as soon as possible. If returning call, please schedule.     Marii Temple RN

## 2021-09-02 ENCOUNTER — DOCUMENTATION ONLY (OUTPATIENT)
Dept: FAMILY MEDICINE | Facility: CLINIC | Age: 46
End: 2021-09-02

## 2021-09-02 NOTE — LETTER
[facility]   Anticoagulation Clinic  [address] [place]  [address]      September 2, 2021      Belkis Christopher  [address] [facility] [address]      Dear Ms. Christopher,    You are currently under the care of [facility] for your warfarin (Coumadin ) therapy.  We are contacting you because our records show you were due for an INR on 08/12/2021.    There are potentially serious risks when taking warfarin without careful monitoring and we want to make sure you are safely managed.  Routine INR monitoring is required for warfarin refills.     Please call [phone] as soon as possible to schedule an appointment.  If there has been a change in your care or other concerns, please let us know so we can help and or update our records.     Sincerely,       [facility]

## 2021-09-02 NOTE — PROGRESS NOTES
ANTICOAGULATION     Belkis Christopher is overdue for INR check.      Reminder letter sent     INR was due: 8/12/2021.    Marii Temple RN

## 2021-09-23 ENCOUNTER — ANTICOAGULATION THERAPY VISIT (OUTPATIENT)
Dept: ANTICOAGULATION | Facility: CLINIC | Age: 46
End: 2021-09-23

## 2021-09-23 ENCOUNTER — LAB (OUTPATIENT)
Dept: LAB | Facility: CLINIC | Age: 46
End: 2021-09-23
Payer: COMMERCIAL

## 2021-09-23 DIAGNOSIS — D68.59 PRIMARY HYPERCOAGULABLE STATE (H): ICD-10-CM

## 2021-09-23 DIAGNOSIS — Z79.01 LONG TERM CURRENT USE OF ANTICOAGULANT: Primary | ICD-10-CM

## 2021-09-23 DIAGNOSIS — Z86.73 HISTORY OF STROKE: ICD-10-CM

## 2021-09-23 LAB — INR BLD: 2.8 (ref 0.9–1.1)

## 2021-09-23 PROCEDURE — 36416 COLLJ CAPILLARY BLOOD SPEC: CPT

## 2021-09-23 PROCEDURE — 85610 PROTHROMBIN TIME: CPT

## 2021-09-23 NOTE — PROGRESS NOTES
ANTICOAGULATION MANAGEMENT     Belkis Christopher 45 year old female is on warfarin with therapeutic INR result. (Goal INR 2.0-3.0)    Recent labs: (last 7 days)     09/23/21  1506   INR 2.8*       ASSESSMENT     Source(s): Chart Review         Previous INR: Subtherapeutic (last INR was 8 weeks ago on 7/29 and patient advised to return in 2 weeks)    Additional findings: None     PLAN     Recommended plan for no diet, medication or health factor changes affecting INR     Dosing Instructions: Continue your current warfarin dose with next INR in 6 weeks       Summary  As of 9/23/2021    Full warfarin instructions:  2.5 mg every Mon, Fri; 5 mg all other days   Next INR check:  11/4/2021             Sent [employer] message with dosing and follow up instructions    Contact [phone]  to schedule and with any changes, questions or concerns.     Education provided: Please call back if any changes to your diet, medications or how you've been taking warfarin    Plan made per [facility] anticoagulation protocol    Marii Temple RN  Anticoagulation Clinic  9/23/2021    _______________________________________________________________________     Anticoagulation Episode Summary     Current INR goal:  2.0-3.0   TTR:  81.3 % (1 y)   Target end date:  Indefinite   Send INR reminders to:  [facility]    Indications    Long term current use of anticoagulant [Z79.01]  Cerebral artery occlusion with cerebral infarction (H) (Resolved) [I63.50]  Primary hypercoagulable state (H) [D68.59]  History of stroke [Z86.73]           Comments:  * CVA/TIA 2000 (carotid artery), also FVL (hetero). patient frequently misses doses when out of town           Anticoagulation Care Providers     Provider Role Specialty Phone number    Karina Shah DO Referring Internal Medicine [phone]

## 2021-10-03 ENCOUNTER — HEALTH MAINTENANCE LETTER (OUTPATIENT)
Age: 46
End: 2021-10-03

## 2021-11-04 ENCOUNTER — LAB (OUTPATIENT)
Dept: LAB | Facility: CLINIC | Age: 46
End: 2021-11-04
Payer: COMMERCIAL

## 2021-11-04 ENCOUNTER — ANTICOAGULATION THERAPY VISIT (OUTPATIENT)
Dept: ANTICOAGULATION | Facility: CLINIC | Age: 46
End: 2021-11-04

## 2021-11-04 DIAGNOSIS — D68.59 PRIMARY HYPERCOAGULABLE STATE (H): ICD-10-CM

## 2021-11-04 DIAGNOSIS — Z79.01 LONG TERM CURRENT USE OF ANTICOAGULANT: Primary | ICD-10-CM

## 2021-11-04 DIAGNOSIS — Z86.73 HISTORY OF STROKE: ICD-10-CM

## 2021-11-04 LAB — INR BLD: 2.3 (ref 0.9–1.1)

## 2021-11-04 PROCEDURE — 85610 PROTHROMBIN TIME: CPT

## 2021-11-04 PROCEDURE — 36416 COLLJ CAPILLARY BLOOD SPEC: CPT

## 2021-11-04 NOTE — PROGRESS NOTES
ANTICOAGULATION MANAGEMENT     Belkis Christopher 45 year old female is on warfarin with therapeutic INR result. (Goal INR 2.0-3.0)    Recent labs: (last 7 days)     11/04/21  1453   INR 2.3*       ASSESSMENT     Source(s): Chart Review       Warfarin doses taken:     Diet:     New illness, injury, or hospitalization:     Medication/supplement changes: None noted    Signs or symptoms of bleeding or clotting:     Previous INR: Therapeutic last visit; previously outside of goal range    Additional findings: None     PLAN     Recommended plan for no diet, medication or health factor changes affecting INR     Dosing Instructions: Continue your current warfarin dose with next INR in 6 weeks       Summary  As of 11/4/2021    Full warfarin instructions:  2.5 mg every Mon, Fri; 5 mg all other days   Next INR check:  12/16/2021             Detailed voice message left for Belkis with dosing instructions and follow up date.   Sent [employer] message with dosing and follow up instructions    Contact [phone]  to schedule and with any changes, questions or concerns.     Education provided: Please call back if any changes to your diet, medications or how you've been taking warfarin    Plan made per [facility] anticoagulation protocol    Marii Temple, RN  Anticoagulation Clinic  11/4/2021    _______________________________________________________________________     Anticoagulation Episode Summary     Current INR goal:  2.0-3.0   TTR:  81.3 % (1 y)   Target end date:  Indefinite   [facility]    Indications    Long term current use of anticoagulant [Z79.01]  Cerebral artery occlusion with cerebral infarction (H) (Resolved) [I63.50]  Primary hypercoagulable state (H) [D68.59]  History of stroke [Z86.73]           Comments:  * CVA/TIA 2000 (carotid artery), also FVL (hetero).         Anticoagulation Care Providers     Provider Role Specialty Phone number    Karina Shah DO Referring Internal  Medicine [phone]

## 2021-11-16 ENCOUNTER — VIRTUAL VISIT (OUTPATIENT)
Dept: FAMILY MEDICINE | Facility: CLINIC | Age: 46
End: 2021-11-16
Payer: COMMERCIAL

## 2021-11-16 DIAGNOSIS — Z71.84 TRAVEL ADVICE ENCOUNTER: Primary | ICD-10-CM

## 2021-11-16 PROCEDURE — 99207 PR NO CHARGE LOS: CPT | Mod: 25 | Performed by: FAMILY MEDICINE

## 2021-12-04 ENCOUNTER — LAB (OUTPATIENT)
Dept: LAB | Facility: CLINIC | Age: 46
End: 2021-12-04
Attending: FAMILY MEDICINE
Payer: COMMERCIAL

## 2021-12-04 DIAGNOSIS — Z71.84 TRAVEL ADVICE ENCOUNTER: ICD-10-CM

## 2021-12-04 PROCEDURE — U0005 INFEC AGEN DETEC AMPLI PROBE: HCPCS

## 2021-12-04 PROCEDURE — U0003 INFECTIOUS AGENT DETECTION BY NUCLEIC ACID (DNA OR RNA); SEVERE ACUTE RESPIRATORY SYNDROME CORONAVIRUS 2 (SARS-COV-2) (CORONAVIRUS DISEASE [COVID-19]), AMPLIFIED PROBE TECHNIQUE, MAKING USE OF HIGH THROUGHPUT TECHNOLOGIES AS DESCRIBED BY CMS-2020-01-R: HCPCS

## 2021-12-05 LAB — SARS-COV-2 RNA RESP QL NAA+PROBE: NEGATIVE

## 2021-12-16 ENCOUNTER — ANTICOAGULATION THERAPY VISIT (OUTPATIENT)
Dept: ANTICOAGULATION | Facility: CLINIC | Age: 46
End: 2021-12-16

## 2021-12-16 ENCOUNTER — LAB (OUTPATIENT)
Dept: LAB | Facility: CLINIC | Age: 46
End: 2021-12-16
Payer: COMMERCIAL

## 2021-12-16 DIAGNOSIS — Z79.01 LONG TERM CURRENT USE OF ANTICOAGULANT: Primary | ICD-10-CM

## 2021-12-16 DIAGNOSIS — D68.59 PRIMARY HYPERCOAGULABLE STATE (H): ICD-10-CM

## 2021-12-16 DIAGNOSIS — Z86.73 HISTORY OF STROKE: ICD-10-CM

## 2021-12-16 LAB — INR BLD: 2.1 (ref 0.9–1.1)

## 2021-12-16 PROCEDURE — 36416 COLLJ CAPILLARY BLOOD SPEC: CPT

## 2021-12-16 PROCEDURE — 85610 PROTHROMBIN TIME: CPT

## 2021-12-16 NOTE — PROGRESS NOTES
Anticoagulation Management    Unable to reach Belkis today.    Today's INR result of 2.1 is therapeutic (goal INR of 2.0-3.0).  Result received from: Clinic Lab    Follow up required to confirm warfarin dose taken and assess for changes and assess for changes     Left message to continue current dose of warfarin 5 mg tonight.   Left message on voicemail with instructions and to call back and speak with nurse or reply to [employer] message.       Anticoagulation clinic to follow up    Farideh Torres RN

## 2021-12-17 NOTE — PROGRESS NOTES
ANTICOAGULATION MANAGEMENT     Belkis Christopher 46 year old female is on warfarin with therapeutic INR result. (Goal INR 2.0-3.0)    Recent labs: (last 7 days)     12/16/21  1544   INR 2.1*       ASSESSMENT     Source(s): Patient/Caregiver Call         Previous INR: Therapeutic last 2(+) visits       PLAN         Dosing Instructions: Continue your current warfarin dose with next INR in 6 weeks       Summary  As of 12/16/2021    Full warfarin instructions:  2.5 mg every Mon, Fri; 5 mg all other days   Next INR check:  1/27/2022             Detailed voice message left for Belkis with dosing instructions and follow up date.   Sent [employer] message with dosing and follow up instructions    Contact [phone]  to schedule and with any changes, questions or concerns.     Education provided: Please call back if any changes to your diet, medications or how you've been taking warfarin and Contact [phone]  with any changes, questions or concerns.     Plan made per [facility] anticoagulation protocol    Christian Cochran RN  Anticoagulation Clinic  12/17/2021    _______________________________________________________________________     Anticoagulation Episode Summary     Current INR goal:  2.0-3.0   TTR:  84.0 % (1 y)   Target end date:  Indefinite   Send INR reminders to:  [facility]    Indications    Long term current use of anticoagulant [Z79.01]  Cerebral artery occlusion with cerebral infarction (H) (Resolved) [I63.50]  Primary hypercoagulable state (H) [D68.59]  History of stroke [Z86.73]           Comments:  *         Anticoagulation Care Providers     Provider Role Specialty Phone number    Karina Shah DO Referring Internal Medicine [phone]

## 2022-01-11 DIAGNOSIS — E78.5 HYPERLIPIDEMIA LDL GOAL <70: Primary | ICD-10-CM

## 2022-01-11 DIAGNOSIS — N92.0 MENORRHAGIA WITH REGULAR CYCLE: ICD-10-CM

## 2022-01-11 DIAGNOSIS — E03.8 SUBCLINICAL HYPOTHYROIDISM: ICD-10-CM

## 2022-01-11 DIAGNOSIS — Z79.01 LONG TERM CURRENT USE OF ANTICOAGULANT: ICD-10-CM

## 2022-01-11 DIAGNOSIS — Z86.73 HISTORY OF STROKE: ICD-10-CM

## 2022-01-13 RX ORDER — ATORVASTATIN CALCIUM 40 MG/1
40 TABLET, FILM COATED ORAL DAILY
Qty: 90 TABLET | Refills: 0 | Status: SHIPPED | OUTPATIENT
Start: 2022-01-13 | End: 2022-06-27

## 2022-01-13 RX ORDER — WARFARIN SODIUM 5 MG/1
TABLET ORAL
Qty: 90 TABLET | Refills: 1 | Status: SHIPPED | OUTPATIENT
Start: 2022-01-13 | End: 2022-08-24

## 2022-01-13 NOTE — TELEPHONE ENCOUNTER
Pending Prescriptions:                       Disp   Refills    JANTOVEN ANTICOAGULANT 5 MG tablet [Pharma*90 tab*1        Sig: TAKE ONE-HALF TABLET (2.5MG) BY MOUTH ONCE DAILY ON           MONDAY AND FRIDAY, AND TAKE ONE TABLET (5MG) ONCE           DAILY ON ALL OTHER DAYS OF THE WEEK OR AS           DIRECTED BY ANTICOAGULATION CLINIC    atorvastatin (LIPITOR) 40 MG tablet [Pharm*90 tab*3        Sig: TAKE ONE TABLET BY MOUTH ONCE DAILY    Routing refill request to provider for review/approval because:  Labs out of range/overdue:    INR   Date Value Ref Range Status   12/16/2021 2.1 (H) 0.9 - 1.1 Final   06/02/2021 2.80 (H) 0.86 - 1.14 Final     Comment:     This test is intended for monitoring Coumadin therapy.  Results are not   accurate in patients with prolonged INR due to factor deficiency.        LDL Cholesterol Calculated   Date Value Ref Range Status   12/21/2020 111 (H) <100 mg/dL Final     Comment:     Above desirable:  100-129 mg/dl  Borderline High:  130-159 mg/dL  High:             160-189 mg/dL  Very high:       >189 mg/dl       Katy Carranza RN  [facility]

## 2022-02-03 ENCOUNTER — TELEPHONE (OUTPATIENT)
Dept: FAMILY MEDICINE | Facility: CLINIC | Age: 47
End: 2022-02-03
Payer: COMMERCIAL

## 2022-02-14 ENCOUNTER — LAB (OUTPATIENT)
Dept: LAB | Facility: CLINIC | Age: 47
End: 2022-02-14
Payer: COMMERCIAL

## 2022-02-14 ENCOUNTER — ANTICOAGULATION THERAPY VISIT (OUTPATIENT)
Dept: ANTICOAGULATION | Facility: CLINIC | Age: 47
End: 2022-02-14

## 2022-02-14 DIAGNOSIS — D68.59 PRIMARY HYPERCOAGULABLE STATE (H): ICD-10-CM

## 2022-02-14 DIAGNOSIS — Z86.73 HISTORY OF STROKE: ICD-10-CM

## 2022-02-14 DIAGNOSIS — Z79.01 LONG TERM CURRENT USE OF ANTICOAGULANT: Primary | ICD-10-CM

## 2022-02-14 LAB — INR BLD: 2.7 (ref 0.9–1.1)

## 2022-02-14 PROCEDURE — 36416 COLLJ CAPILLARY BLOOD SPEC: CPT

## 2022-02-14 PROCEDURE — 85610 PROTHROMBIN TIME: CPT

## 2022-02-14 NOTE — PROGRESS NOTES
ANTICOAGULATION MANAGEMENT     Belkis Christopher 46 year old female is on warfarin with therapeutic INR result. (Goal INR 2.0-3.0)    Recent labs: (last 7 days)     02/14/22  1548   INR 2.7*       ASSESSMENT     Source(s): Chart Review       Previous INR: Therapeutic last 2(+) visits    Additional findings: None     PLAN     Recommended plan for no diet, medication or health factor changes affecting INR     Dosing Instructions: Continue your current warfarin dose with next INR in 6 weeks       Summary  As of 2/14/2022    Full warfarin instructions:  2.5 mg every Mon, Fri; 5 mg all other days   Next INR check:  3/28/2022             Detailed voice message left for Belkis with dosing instructions and follow up date.   Sent [employer] message with dosing and follow up instructions    Contact [phone]  to schedule and with any changes, questions or concerns.     Education provided: Please call back if any changes to your diet, medications or how you've been taking warfarin    Plan made per [facility] anticoagulation protocol    Marii Temple RN  Anticoagulation Clinic  2/14/2022    _______________________________________________________________________     Anticoagulation Episode Summary     Current INR goal:  2.0-3.0   TTR:  95.0 % (1 y)   Target end date:  Indefinite   Send INR reminders to:  [facility]    Indications    Long term current use of anticoagulant [Z79.01]  Cerebral artery occlusion with cerebral infarction (H) (Resolved) [I63.50]  Primary hypercoagulable state (H) [D68.59]  History of stroke [Z86.73]           Comments:  *         Anticoagulation Care Providers     Provider Role Specialty Phone number    Karina Shah DO Referring Internal Medicine [phone]

## 2022-04-05 ENCOUNTER — TELEPHONE (OUTPATIENT)
Dept: ANTICOAGULATION | Facility: CLINIC | Age: 47
End: 2022-04-05
Payer: COMMERCIAL

## 2022-04-12 ENCOUNTER — TELEPHONE (OUTPATIENT)
Dept: ANTICOAGULATION | Facility: CLINIC | Age: 47
End: 2022-04-12
Payer: COMMERCIAL

## 2022-04-12 NOTE — TELEPHONE ENCOUNTER
ANTICOAGULATION     Belkis Christopher is overdue for INR check.      Left message for patient to call and schedule lab appointment as soon as possible. If returning call, please schedule.     Christian Cochran RN

## 2022-04-19 ENCOUNTER — DOCUMENTATION ONLY (OUTPATIENT)
Dept: ANTICOAGULATION | Facility: CLINIC | Age: 47
End: 2022-04-19
Payer: COMMERCIAL

## 2022-04-19 NOTE — PROGRESS NOTES
ANTICOAGULATION     Belkis Christopher is overdue for INR check.      Reminder letter sent    Christian Cochran RN

## 2022-04-19 NOTE — LETTER
April 19, 2022      Belkis Christopher  [address]  [place] [address]      Dear Belkis,    You are currently under the care of [facility] for your warfarin (Coumadin ) therapy.  We are contacting you because our records show you were due for an INR on 3/28/22.    There are potentially serious risks when taking warfarin without careful monitoring and we want to make sure you are safely managed.  Routine INR monitoring is required for warfarin refills.     Please call [phone]  as soon as possible to schedule an appointment.  If there has been a change in your care or other concerns, please let us know so we can help and or update our records.     Sincerely,       [facility]

## 2022-05-03 ENCOUNTER — LAB (OUTPATIENT)
Dept: LAB | Facility: CLINIC | Age: 47
End: 2022-05-03
Payer: COMMERCIAL

## 2022-05-03 ENCOUNTER — ANTICOAGULATION THERAPY VISIT (OUTPATIENT)
Dept: ANTICOAGULATION | Facility: CLINIC | Age: 47
End: 2022-05-03

## 2022-05-03 DIAGNOSIS — Z79.01 LONG TERM CURRENT USE OF ANTICOAGULANT: Primary | ICD-10-CM

## 2022-05-03 DIAGNOSIS — D68.59 PRIMARY HYPERCOAGULABLE STATE (H): ICD-10-CM

## 2022-05-03 DIAGNOSIS — Z86.73 HISTORY OF STROKE: ICD-10-CM

## 2022-05-03 LAB — INR BLD: 2.5 (ref 0.9–1.1)

## 2022-05-03 PROCEDURE — 85610 PROTHROMBIN TIME: CPT

## 2022-05-03 PROCEDURE — 36416 COLLJ CAPILLARY BLOOD SPEC: CPT

## 2022-05-03 NOTE — PROGRESS NOTES
ANTICOAGULATION MANAGEMENT     Belkis Christopher 46 year old female is on warfarin with therapeutic INR result. (Goal INR 2.0-3.0)    Recent labs: (last 7 days)     05/03/22  1522   INR 2.5*       ASSESSMENT       Source(s): Chart Review    Previous INR was Therapeutic last 2(+) visits    Medication, diet, health changes since last INR chart reviewed; none identified           PLAN     Recommended plan for no diet, medication or health factor changes affecting INR     Dosing Instructions: continue your current warfarin dose with next INR in 6 weeks       Summary  As of 5/3/2022    Full warfarin instructions:  2.5 mg every Mon, Fri; 5 mg all other days   Next INR check:  6/14/2022             Detailed voice message left for Belkis with dosing instructions and follow up date.     Contact [phone]  to schedule and with any changes, questions or concerns.     Education provided: Please call back if any changes to your diet, medications or how you've been taking warfarin and Contact [phone]  with any changes, questions or concerns.     Plan made per [facility] anticoagulation protocol    Christian Cochran RN  Anticoagulation Clinic  5/3/2022    _______________________________________________________________________     Anticoagulation Episode Summary     Current INR goal:  2.0-3.0   TTR:  96.6 % (1 y)   Target end date:  Indefinite   Send INR reminders to:  [facility]    Indications    Long term current use of anticoagulant [Z79.01]  Cerebral artery occlusion with cerebral infarction (H) (Resolved) [I63.50]  Primary hypercoagulable state (H) [D68.59]  History of stroke [Z86.73]           Comments:  *         Anticoagulation Care Providers     Provider Role Specialty Phone number    Karina Shah DO Referring Internal Medicine [phone]

## 2022-05-15 ENCOUNTER — HEALTH MAINTENANCE LETTER (OUTPATIENT)
Age: 47
End: 2022-05-15

## 2022-06-21 ENCOUNTER — TELEPHONE (OUTPATIENT)
Dept: ANTICOAGULATION | Facility: CLINIC | Age: 47
End: 2022-06-21
Payer: COMMERCIAL

## 2022-06-23 ENCOUNTER — TELEPHONE (OUTPATIENT)
Dept: FAMILY MEDICINE | Facility: CLINIC | Age: 47
End: 2022-06-23

## 2022-06-23 DIAGNOSIS — Z86.73 HISTORY OF STROKE: ICD-10-CM

## 2022-06-23 NOTE — TELEPHONE ENCOUNTER
Sent patient a SPO message with regards to this refill request. There is a gap in fill and asking if she is still taking this medication.     Saida Fleming, JAKN, RN, PHN

## 2022-06-24 NOTE — TELEPHONE ENCOUNTER
Attempted to notify patient re: gap since last fill, as well as to notify that she is due for an appointment. No answer, non-detailed message left to return call to clinic.    Katy Carranza RN  [facility]

## 2022-06-24 NOTE — TELEPHONE ENCOUNTER
Clinic RN sent reach out to patient today, left message.  Please see mychart message.    Thank you    Janeen BAILEY RN

## 2022-06-27 RX ORDER — ATORVASTATIN CALCIUM 40 MG/1
TABLET, FILM COATED ORAL
Qty: 30 TABLET | Refills: 0 | Status: SHIPPED | OUTPATIENT
Start: 2022-06-27 | End: 2022-07-29

## 2022-06-27 NOTE — TELEPHONE ENCOUNTER
Patient reached and appts are made.  She is given 1 month of medication to cover until seen in clinic. Vilma GREENE RN

## 2022-07-07 ENCOUNTER — ANTICOAGULATION THERAPY VISIT (OUTPATIENT)
Dept: ANTICOAGULATION | Facility: CLINIC | Age: 47
End: 2022-07-07

## 2022-07-07 ENCOUNTER — LAB (OUTPATIENT)
Dept: LAB | Facility: CLINIC | Age: 47
End: 2022-07-07
Payer: COMMERCIAL

## 2022-07-07 DIAGNOSIS — D68.59 PRIMARY HYPERCOAGULABLE STATE (H): ICD-10-CM

## 2022-07-07 LAB — INR BLD: 2.1 (ref 0.9–1.1)

## 2022-07-07 PROCEDURE — 36416 COLLJ CAPILLARY BLOOD SPEC: CPT

## 2022-07-07 PROCEDURE — 85610 PROTHROMBIN TIME: CPT

## 2022-07-07 NOTE — PROGRESS NOTES
ANTICOAGULATION MANAGEMENT     Belkis Christopher 46 year old female is on warfarin with therapeutic INR result. (Goal INR 2.0-3.0)    Recent labs: (last 7 days)     07/07/22  1546   INR 2.1*       ASSESSMENT       Source(s): Chart Review    Previous INR was Therapeutic last 2(+) visits    Medication, diet, health changes since last INR chart reviewed; none identified     Previous INR level was therapeutic on 5/3/22    Left a detailed voicemail with dosing and requested a call back if she has a change in health,updates, questions or concerns.           PLAN       Dosing Instructions: continue your current warfarin dose with next INR in 6 weeks       Summary  As of 7/7/2022    Full warfarin instructions:  2.5 mg every Mon, Fri; 5 mg all other days   Next INR check:  8/18/2022             Detailed voice message left for Belkis with dosing instructions and follow up date.     Contact [phone]  to schedule and with any changes, questions or concerns.     Education provided: Importance of notifying clinic for changes in medications; a sooner lab recheck maybe needed. and Contact [phone]  with any changes, questions or concerns.     Plan made per [facility] anticoagulation protocol    Cassie Godinez, RN  Anticoagulation Clinic  7/7/2022    _______________________________________________________________________     Anticoagulation Episode Summary     Current INR goal:  2.0-3.0   TTR:  96.6 % (1 y)   Target end date:  Indefinite   Send INR reminders to:  [facility]    Indications    Long term current use of anticoagulant [Z79.01]  Cerebral artery occlusion with cerebral infarction (H) (Resolved) [I63.50]  Primary hypercoagulable state (H) [D68.59]  History of stroke [Z86.73]           Comments:  *         Anticoagulation Care Providers     Provider Role Specialty Phone number    Karina Shah DO Referring Internal Medicine [phone]

## 2022-07-27 DIAGNOSIS — Z79.01 LONG TERM CURRENT USE OF ANTICOAGULANT: ICD-10-CM

## 2022-07-27 DIAGNOSIS — Z86.73 HISTORY OF STROKE: ICD-10-CM

## 2022-07-27 DIAGNOSIS — R53.81 MALAISE AND FATIGUE: Primary | ICD-10-CM

## 2022-07-27 DIAGNOSIS — R53.83 MALAISE AND FATIGUE: Primary | ICD-10-CM

## 2022-07-29 ENCOUNTER — VIRTUAL VISIT (OUTPATIENT)
Dept: FAMILY MEDICINE | Facility: CLINIC | Age: 47
End: 2022-07-29
Payer: COMMERCIAL

## 2022-07-29 DIAGNOSIS — Z12.11 SCREEN FOR COLON CANCER: ICD-10-CM

## 2022-07-29 DIAGNOSIS — E78.5 HYPERLIPIDEMIA LDL GOAL <70: ICD-10-CM

## 2022-07-29 DIAGNOSIS — D68.59 PRIMARY HYPERCOAGULABLE STATE (H): ICD-10-CM

## 2022-07-29 DIAGNOSIS — I77.71 CAROTID ARTERY DISSECTION (H): ICD-10-CM

## 2022-07-29 DIAGNOSIS — Z86.73 HISTORY OF STROKE: ICD-10-CM

## 2022-07-29 DIAGNOSIS — Z12.4 CERVICAL CANCER SCREENING: ICD-10-CM

## 2022-07-29 DIAGNOSIS — Z12.31 VISIT FOR SCREENING MAMMOGRAM: ICD-10-CM

## 2022-07-29 DIAGNOSIS — U07.1 INFECTION DUE TO 2019 NOVEL CORONAVIRUS: Primary | ICD-10-CM

## 2022-07-29 PROCEDURE — 99214 OFFICE O/P EST MOD 30 MIN: CPT | Mod: 95 | Performed by: INTERNAL MEDICINE

## 2022-07-29 RX ORDER — ATORVASTATIN CALCIUM 40 MG/1
40 TABLET, FILM COATED ORAL DAILY
Qty: 90 TABLET | Refills: 3 | Status: SHIPPED | OUTPATIENT
Start: 2022-07-29 | End: 2022-08-22

## 2022-07-29 ASSESSMENT — PAIN SCALES - GENERAL: PAINLEVEL: NO PAIN (0)

## 2022-07-29 NOTE — PROGRESS NOTES
Belkis is a 46 year old who is being evaluated via a billable telephone visit.      What phone number would you like to be contacted at? [phone]  How would you like to obtain your AVS? [facility]    Assessment & Plan   Problem List Items Addressed This Visit     Carotid artery dissection (H)    History of stroke    Relevant Medications    atorvastatin (LIPITOR) 40 MG tablet    Other Relevant Orders    Lipid panel reflex to direct LDL Fasting    Hyperlipidemia LDL goal <70    Relevant Medications    atorvastatin (LIPITOR) 40 MG tablet    Other Relevant Orders    Lipid panel reflex to direct LDL Fasting    Primary hypercoagulable state (H)      Other Visit Diagnoses     Infection due to 2019 novel coronavirus    -  Primary    Screen for colon cancer        Relevant Orders    Colonscopy Screening  Referral    Cervical cancer screening        Visit for screening mammogram        Relevant Orders    MA SCREENING DIGITAL BILAT - Future  (s+30)                    Patient Instructions   Health Care Maintenance:  1. You are due for a mammogram.  Please call [phone] to schedule.  2. You are due for a colonoscopy - now starts at age 45.  Please call [phone] to schedule    Hyperlipidemia:  1. Refill sent x 1 year  2. Due for lab test    COVID:  1. Discussed treatment for COVID but you declined given that you are 5 days into symptoms and feeling better, risk of drug interaction with warfarin       Instructions for Patients      What are the symptoms of COVID-19?  Symptoms can include fever, cough, shortness of breath, chills, headache, muscle pain sore throat, fatigue, runny or stuffy nose, and loss of taste and smell. Other less common symptoms include nausea, vomiting, or diarrhea (watery stools).    Know when to call 911. Emergency warning signs include:    Trouble breathing or shortness of breath    Pain or pressure in the chest that doesn't go away    Feeling confused like you haven't felt before, or not  being able to wake up    Bluish-colored lips or face    How can I take care of myself?  1. Get lots of rest. Drink extra fluids (unless a doctor has told you not to).  2. Take Tylenol (acetaminophen) for fever or pain. If you have liver or kidney problems, ask your family doctor if it's okay to take Tylenol   Adults:   650 mg (two 325 mg pills or tablets) every 4 to 6 hours, or...   1,000 mg (two 500 mg pills or tablets) every 8 hours as needed.  Note: Don't take more than 3,000 mg in one day. Acetaminophen is found in many medicines (both prescribed and over the counter). Read all labels to be sure you don't take too much.  For children, check the Tylenol bottle for the right dose. The dose is based on the child's age or weight.  3. Take over the counter medicines for your symptoms as needed. Talk to your pharmacist.  4. If you have other health problems (like cancer, heart failure, an organ transplant, or severe kidney disease): Call your specialty clinic if you don't feel better in the next 2 days.    These guidelines are for isolating and quarantining before returning to work, school or .     For employers, schools and day cares: This is an official notice for this person s medical guidelines for returning in-person.     For health care sites: The CDC gives different isolation and quarantine guidelines for healthcare sites, please check with these sites before arriving.     How do I self-isolate?  You isolate when you have symptoms of COVID or a test shows you have COVID, even if you don t have symptoms.     If you DO have symptoms:  o Stay home and away from others  - For at least 5 days after your symptoms started, AND   - You are fever free for 24 hours (with no medicine that reduces fever), AND  - Your other symptoms are better.  o Wear a mask for 10 full days any time you are around others.    If you DON T have symptoms:  o Stay at home and away from others for at least 5 days after your positive  test.  o Wear a mask for 10 full days any time you are around others.    How and when do I quarantine?  You quarantine when you may have been exposed to the virus and DON T have symptoms.     Stay home and away from others.     You must quarantine for 5 days after your last contact with a person who has COVID.  o Note: If you are fully vaccinated, you don t need to quarantine. You should still follow the steps below.     Wear a mask for 10 full days any time you re around others.    Get tested at least 5 days after you were exposed, even if you don t have symptoms.     If you start to have symptoms, isolate right away and get tested.    Where can I get more information?    [facility] COVID-19 Resource Hub: www.[employer].org/covid19/     CDC Quarantine & Isolation: https://www.cdc.gov/coronavirus/2019-ncov/your-health/quarantine-isolation.html     [facility] - What to Do If You're Sick: https://www.cdc.gov/coronavirus/2019-ncov/if-you-are-sick/index.html    [facility] clinical trials (COVID-19 research studies): [email or web address]    [place] Department of Health COVID-19 Public Hotline: 1-776.850.7612      No follow-ups on file.    Karina Shah,   [facility]    Coby Tamayo is a 46 year old accompanied by her self, presenting for the following health issues:  Covid Concern (Tested on Monday at home 7/25/22), Lipids (Needs refill ), and Health Maintenance (Aware due for HM stuff )      HPI       Chief Complaint   Patient presents with     Covid Concern     Tested on Monday at home 7/25/22     Lipids     Needs refill      Health Maintenance     Aware due for HM stuff          COVID-19 Symptom Review  How many days ago did these symptoms start? 7/24/22    Are any of the following symptoms significant for you?    New or worsening difficulty breathing? No    Worsening cough? Yes, it's a dry cough.     Fever or chills? Yes, the highest  "temperature was 100.9 - last was 2 days ago    Headache: YES    Sore throat: YES    Chest pain: No    Diarrhea: YES- 1st day on Sunday 7/24    Body aches? No    What treatments has patient tried? Guaifenesin (mucinex), Acetaminophen and Dayquil acetaminophen is helping   Does patient live in a nursing home, group home, or shelter? No  Does patient have a way to get food/medications during quarantined? Yes, I have a friend or family member who can help me.  --still having cough, sore throat, fatigue - feels like a 'bad cold'  --she has not been vaccinated  --she is interested in covid treatment discussion or other treatments for cough              Hyperlipidemia Follow-Up      Are you regularly taking any medication or supplement to lower your cholesterol?   Yes- PM    Are you having muscle aches or other side effects that you think could be caused by your cholesterol lowering medication?  No    How many servings of fruits and vegetables do you eat daily?  2-3    On average, how many sweetened beverages do you drink each day (Examples: soda, juice, sweet tea, etc.  Do NOT count diet or artificially sweetened beverages)?   1    How many days per week do you exercise enough to make your heart beat faster? 7    How many minutes a day do you exercise enough to make your heart beat faster? 60 or more    How many days per week do you miss taking your medication? 0             No flowsheet data found.    Estimated body mass index is 31.47 kg/m  as calculated from the following:    Height as of 2/19/20: 1.676 m (5' 6\").    Weight as of 2/19/20: 88.5 kg (195 lb).     GFR Estimate   Date Value Ref Range Status   12/21/2020 >90 >60 mL/min/[1.73_m2] Final     Comment:     Non  GFR Calc  Starting 12/18/2018, serum creatinine based estimated GFR (eGFR) will be   calculated using the Chronic Kidney Disease Epidemiology Collaboration   (CKD-EPI) equation.          FDA Facts Sheet  Lexicomp Drug Interaction " review    Medications were reviewed with the patient and held or adjusted where applicable.    Current Outpatient Medications   Medication     atorvastatin (LIPITOR) 40 MG tablet     cholecalciferol (VITAMIN D3) 125 mcg (5000 units) capsule     coenzyme Q-10 200 MG CAPS     warfarin ANTICOAGULANT (JANTOVEN ANTICOAGULANT) 5 MG tablet     No current facility-administered medications for this visit.                           -               Review of Systems   Constitutional, HEENT, cardiovascular, pulmonary, gi and gu systems are negative, except as otherwise noted.      Objective    Vitals - Patient Reported  Pain Score: No Pain (0)      Vitals:  No vitals were obtained today due to virtual visit.    Physical Exam   healthy, alert and no distress  PSYCH: Alert and oriented times 3; coherent speech, normal   rate and volume, able to articulate logical thoughts, able   to abstract reason, no tangential thoughts, no hallucinations   or delusions  Her affect is normal  RESP: No cough, no audible wheezing, able to talk in full sentences  Remainder of exam unable to be completed due to telephone visits                Phone call duration: 18 minutes    .  ..

## 2022-07-29 NOTE — PATIENT INSTRUCTIONS
Health Care Maintenance:  You are due for a mammogram.  Please call [phone] to schedule.  You are due for a colonoscopy - now starts at age 45.  Please call [phone] to schedule    Hyperlipidemia:  Refill sent x 1 year  Due for lab test    COVID:  1. Discussed treatment for COVID but you declined given that you are 5 days into symptoms and feeling better, risk of drug interaction with warfarin       Instructions for Patients      What are the symptoms of COVID-19?  Symptoms can include fever, cough, shortness of breath, chills, headache, muscle pain sore throat, fatigue, runny or stuffy nose, and loss of taste and smell. Other less common symptoms include nausea, vomiting, or diarrhea (watery stools).    Know when to call 911. Emergency warning signs include:  Trouble breathing or shortness of breath  Pain or pressure in the chest that doesn't go away  Feeling confused like you haven't felt before, or not being able to wake up  Bluish-colored lips or face    How can I take care of myself?  Get lots of rest. Drink extra fluids (unless a doctor has told you not to).  Take Tylenol (acetaminophen) for fever or pain. If you have liver or kidney problems, ask your family doctor if it's okay to take Tylenol   Adults:   650 mg (two 325 mg pills or tablets) every 4 to 6 hours, or...   1,000 mg (two 500 mg pills or tablets) every 8 hours as needed.  Note: Don't take more than 3,000 mg in one day. Acetaminophen is found in many medicines (both prescribed and over the counter). Read all labels to be sure you don't take too much.  For children, check the Tylenol bottle for the right dose. The dose is based on the child's age or weight.  Take over the counter medicines for your symptoms as needed. Talk to your pharmacist.  If you have other health problems (like cancer, heart failure, an organ transplant, or severe kidney disease): Call your specialty clinic if you don't feel better in the next 2 days.    These  guidelines are for isolating and quarantining before returning to work, school or .   For employers, schools and day cares: This is an official notice for this person s medical guidelines for returning in-person.   For health care sites: The CDC gives different isolation and quarantine guidelines for healthcare sites, please check with these sites before arriving.     How do I self-isolate?  You isolate when you have symptoms of COVID or a test shows you have COVID, even if you don t have symptoms.   If you DO have symptoms:  Stay home and away from others  For at least 5 days after your symptoms started, AND   You are fever free for 24 hours (with no medicine that reduces fever), AND  Your other symptoms are better.  Wear a mask for 10 full days any time you are around others.  If you DON T have symptoms:  Stay at home and away from others for at least 5 days after your positive test.  Wear a mask for 10 full days any time you are around others.    How and when do I quarantine?  You quarantine when you may have been exposed to the virus and DON T have symptoms.   Stay home and away from others.   You must quarantine for 5 days after your last contact with a person who has COVID.  Note: If you are fully vaccinated, you don t need to quarantine. You should still follow the steps below.   Wear a mask for 10 full days any time you re around others.  Get tested at least 5 days after you were exposed, even if you don t have symptoms.   If you start to have symptoms, isolate right away and get tested.    Where can I get more information?  [facility] COVID-19 Resource Hub: [email or web address]   CDC Quarantine & Isolation: https://www.cdc.gov/coronavirus/2019-ncov/your-health/quarantine-isolation.html   CDC - What to Do If You're Sick: https://www.cdc.gov/coronavirus/2019-ncov/if-you-are-sick/index.html  [facility] clinical trials (COVID-19 research studies):  [email or web address]  [place] Department of Health COVID-19 Public Hotline: 1-145.491.4325

## 2022-08-11 ENCOUNTER — DOCUMENTATION ONLY (OUTPATIENT)
Dept: ANTICOAGULATION | Facility: CLINIC | Age: 47
End: 2022-08-11

## 2022-08-11 DIAGNOSIS — Z86.73 HISTORY OF STROKE: ICD-10-CM

## 2022-08-11 DIAGNOSIS — D68.59 PRIMARY HYPERCOAGULABLE STATE (H): Primary | ICD-10-CM

## 2022-08-11 NOTE — PROGRESS NOTES
ANTICOAGULATION CLINIC REFERRAL RENEWAL REQUEST       An annual renewal order is required for all patients referred to [facility].?  Please review and sign the pended referral order for Belkis Christopher.       ANTICOAGULATION SUMMARY      Warfarin indication(s)   History of stroke, hypercoaguable state, cerebral artery occlusion    Mechanical heart valve present?  NO       Current goal range   INR: 2.0-3.0     Goal appropriate for indication? Goal INR 2-3, standard for indication(s) above     Time in Therapeutic Range (TTR)  (Goal > 60%) 96.6%       Office visit with referring provider's group within last year no on 12/18/2020       Jazzmine Jimenez RN  [facility]

## 2022-08-15 ENCOUNTER — TELEPHONE (OUTPATIENT)
Dept: FAMILY MEDICINE | Facility: CLINIC | Age: 47
End: 2022-08-15

## 2022-08-15 NOTE — TELEPHONE ENCOUNTER
Patient Quality Outreach    Patient is due for the following:   Colon Cancer Screening  Breast Cancer Screening - Mammogram  Cervical Cancer Screening - PAP Needed  Physical Preventive Adult Physical      Topic Date Due    COVID-19 Vaccine (1) Never done    Diptheria Tetanus Pertussis (DTAP/TDAP/TD) Vaccine (3 - Td or Tdap) 07/28/2020       Next Steps:   Schedule a Adult Preventative    Type of outreach:    Sent [employer] message.      Questions for provider review:    None     SHEILA Jin LPN

## 2022-08-20 ENCOUNTER — LAB (OUTPATIENT)
Dept: LAB | Facility: CLINIC | Age: 47
End: 2022-08-20
Payer: COMMERCIAL

## 2022-08-20 DIAGNOSIS — Z79.01 LONG TERM CURRENT USE OF ANTICOAGULANT: ICD-10-CM

## 2022-08-20 DIAGNOSIS — Z86.73 HISTORY OF STROKE: ICD-10-CM

## 2022-08-20 DIAGNOSIS — E78.5 HYPERLIPIDEMIA LDL GOAL <70: ICD-10-CM

## 2022-08-20 LAB
CHOLEST SERPL-MCNC: 181 MG/DL
FASTING STATUS PATIENT QL REPORTED: YES
HDLC SERPL-MCNC: 47 MG/DL
INR BLD: 2.4 (ref 0.9–1.1)
LDLC SERPL CALC-MCNC: 115 MG/DL
NONHDLC SERPL-MCNC: 134 MG/DL
TRIGL SERPL-MCNC: 97 MG/DL

## 2022-08-20 PROCEDURE — 36416 COLLJ CAPILLARY BLOOD SPEC: CPT

## 2022-08-20 PROCEDURE — 36415 COLL VENOUS BLD VENIPUNCTURE: CPT

## 2022-08-20 PROCEDURE — 85610 PROTHROMBIN TIME: CPT

## 2022-08-20 PROCEDURE — 80061 LIPID PANEL: CPT

## 2022-08-22 ENCOUNTER — ANTICOAGULATION THERAPY VISIT (OUTPATIENT)
Dept: ANTICOAGULATION | Facility: CLINIC | Age: 47
End: 2022-08-22

## 2022-08-22 DIAGNOSIS — E78.5 HYPERLIPIDEMIA LDL GOAL <70: Primary | ICD-10-CM

## 2022-08-22 DIAGNOSIS — Z79.01 LONG TERM CURRENT USE OF ANTICOAGULANT: Primary | ICD-10-CM

## 2022-08-22 DIAGNOSIS — Z86.73 HISTORY OF STROKE: ICD-10-CM

## 2022-08-22 DIAGNOSIS — D68.59 PRIMARY HYPERCOAGULABLE STATE (H): ICD-10-CM

## 2022-08-22 RX ORDER — ATORVASTATIN CALCIUM 80 MG/1
80 TABLET, FILM COATED ORAL DAILY
Qty: 90 TABLET | Refills: 3 | Status: SHIPPED | OUTPATIENT
Start: 2022-08-22 | End: 2023-12-14

## 2022-08-22 NOTE — PROGRESS NOTES
ANTICOAGULATION MANAGEMENT     Belkis Crhistopher 46 year old female is on warfarin with therapeutic INR result. (Goal INR 2.0-3.0)    Recent labs: (last 7 days)     08/20/22  0910   INR 2.4*       ASSESSMENT       Source(s): Chart review      Previous INR: Therapeutic last 2(+) visits    Additional findings: None     PLAN     Recommended plan for no diet, medication or health factor changes affecting INR     Dosing Instructions: Continue your current warfarin dose with next INR in 6 weeks       Summary  As of 8/22/2022    Full warfarin instructions:  2.5 mg every Mon, Fri; 5 mg all other days   Next INR check:  10/3/2022             Detailed voice message left for Belkis with dosing instructions and follow up date.     Contact [phone]  to schedule and with any changes, questions or concerns.     Education provided: Please call back if any changes to your diet, medications or how you've been taking warfarin    Plan made per [facility] anticoagulation protocol    Vira Gan RN  Anticoagulation Clinic  8/22/2022    _______________________________________________________________________     Anticoagulation Episode Summary     Current INR goal:  2.0-3.0   TTR:  100.0 % (1 y)   Target end date:  Indefinite   Send INR reminders to:  [facility]    Indications    Long term current use of anticoagulant [Z79.01]  Cerebral artery occlusion with cerebral infarction (H) (Resolved) [I63.50]  Primary hypercoagulable state (H) [D68.59]  History of stroke [Z86.73]           Comments:           Anticoagulation Care Providers     Provider Role Specialty Phone number    Karina Shah DO Referring Internal Medicine [phone]

## 2022-08-26 ENCOUNTER — HOSPITAL ENCOUNTER (OUTPATIENT)
Dept: MAMMOGRAPHY | Facility: CLINIC | Age: 47
Discharge: HOME | End: 2022-08-26
Attending: INTERNAL MEDICINE | Admitting: INTERNAL MEDICINE
Payer: COMMERCIAL

## 2022-08-26 DIAGNOSIS — Z12.31 VISIT FOR SCREENING MAMMOGRAM: ICD-10-CM

## 2022-08-26 PROCEDURE — 77067 SCR MAMMO BI INCL CAD: CPT

## 2022-09-11 ENCOUNTER — HEALTH MAINTENANCE LETTER (OUTPATIENT)
Age: 47
End: 2022-09-11

## 2022-10-10 ENCOUNTER — MYC MEDICAL ADVICE (OUTPATIENT)
Dept: ANTICOAGULATION | Facility: CLINIC | Age: 47
End: 2022-10-10

## 2022-10-10 NOTE — TELEPHONE ENCOUNTER
ANTICOAGULATION     Belkis Christopher is overdue for INR check.      Mychart reminder sent    Marii Temple RN

## 2022-10-17 ENCOUNTER — TELEPHONE (OUTPATIENT)
Dept: ANTICOAGULATION | Facility: CLINIC | Age: 47
End: 2022-10-17

## 2022-10-27 ENCOUNTER — LAB (OUTPATIENT)
Dept: LAB | Facility: CLINIC | Age: 47
End: 2022-10-27
Payer: COMMERCIAL

## 2022-10-27 ENCOUNTER — ANTICOAGULATION THERAPY VISIT (OUTPATIENT)
Dept: ANTICOAGULATION | Facility: CLINIC | Age: 47
End: 2022-10-27

## 2022-10-27 DIAGNOSIS — Z86.73 HISTORY OF STROKE: ICD-10-CM

## 2022-10-27 DIAGNOSIS — Z79.01 LONG TERM CURRENT USE OF ANTICOAGULANT: Primary | ICD-10-CM

## 2022-10-27 DIAGNOSIS — D68.59 PRIMARY HYPERCOAGULABLE STATE (H): ICD-10-CM

## 2022-10-27 DIAGNOSIS — Z79.01 LONG TERM CURRENT USE OF ANTICOAGULANT: ICD-10-CM

## 2022-10-27 LAB — INR BLD: 1.2 (ref 0.9–1.1)

## 2022-10-27 PROCEDURE — 85610 PROTHROMBIN TIME: CPT

## 2022-10-27 PROCEDURE — 36416 COLLJ CAPILLARY BLOOD SPEC: CPT

## 2022-10-27 NOTE — PROGRESS NOTES
ANTICOAGULATION MANAGEMENT     Belkis Christopher 46 year old female is on warfarin with subtherapeutic INR result. (Goal INR 2.0-3.0)    Recent labs: (last 7 days)     10/27/22  1508   INR 1.2*       ASSESSMENT       Source(s): Chart Review    Previous INR was Therapeutic last 2(+) visits    Medication, diet, health changes since last INR chart reviewed; none identified           PLAN     Unable to reach Belkis today.    Left message to take a booster dose of warfarin,  10 mg tonight. Request call back for assessment. Mychart sent also.    Follow up required to confirm warfarin dose taken and assess for changes and discuss out of range result     Deb Roman RN  Anticoagulation Clinic  10/27/2022

## 2022-10-28 NOTE — PROGRESS NOTES
ANTICOAGULATION MANAGEMENT     Belkis Christopher 46 year old female is on warfarin with subtherapeutic INR result. (Goal INR 2.0-3.0)    Recent labs: (last 7 days)     10/27/22  1508   INR 1.2*       ASSESSMENT       Source(s): Chart Review and Patient/Caregiver Call       Warfarin doses taken: Missed dose(s) may be affecting INR    Diet: Increased greens/vitamin K in diet; plans to resume previous intake    New illness, injury, or hospitalization: No    Medication/supplement changes: None noted    Signs or symptoms of bleeding or clotting: No    Previous INR: Therapeutic last 2(+) visits    Additional findings: patient ate a lot of brussels sprouts from her garden and out to eat. She also missed a dose last weekend. She did not take a booster dose last night as she did not listen to our VM left yesterday.       PLAN     Recommended plan for temporary change(s) affecting INR     Dosing Instructions: booster dose then continue your current warfarin dose with next INR in 6 days       Summary  As of 10/27/2022    Full warfarin instructions:  10/28: 5 mg; Otherwise 2.5 mg every Mon, Fri; 5 mg all other days; Starting 10/27/2022   Next INR check:  11/2/2022             Telephone call with Belkis who verbalizes understanding and agrees to plan    Lab visit scheduled    Education provided:     Please call back if any changes to your diet, medications or how you've been taking warfarin    Taking warfarin: Importance of taking warfarin as instructed    Dietary considerations: importance of consistent vitamin K intake, impact of vitamin K foods on INR and vitamin K content of foods    Symptom monitoring: monitoring for clotting signs and symptoms, monitoring for stroke signs and symptoms and when to seek medical attention/emergency care    Contact [phone]  with any changes, questions or concerns.     Plan made per [facility] anticoagulation protocol    Deb Roman RN  Anticoagulation  Clinic  10/28/2022    _______________________________________________________________________     Anticoagulation Episode Summary     Current INR goal:  2.0-3.0   TTR:  87.5 % (1 y)   Target end date:  Indefinite   Send INR reminders to:  [facility]    Indications    Long term current use of anticoagulant [Z79.01]  Cerebral artery occlusion with cerebral infarction (H) (Resolved) [I63.50]  Primary hypercoagulable state (H) [D68.59]  History of stroke [Z86.73]           Comments:           Anticoagulation Care Providers     Provider Role Specialty Phone number    Karina Shah DO Referring Internal Medicine [phone]

## 2022-11-02 ENCOUNTER — LAB (OUTPATIENT)
Dept: LAB | Facility: CLINIC | Age: 47
End: 2022-11-02
Payer: COMMERCIAL

## 2022-11-02 ENCOUNTER — ANTICOAGULATION THERAPY VISIT (OUTPATIENT)
Dept: ANTICOAGULATION | Facility: CLINIC | Age: 47
End: 2022-11-02

## 2022-11-02 DIAGNOSIS — D68.59 PRIMARY HYPERCOAGULABLE STATE (H): ICD-10-CM

## 2022-11-02 DIAGNOSIS — Z86.73 HISTORY OF STROKE: ICD-10-CM

## 2022-11-02 DIAGNOSIS — Z79.01 LONG TERM CURRENT USE OF ANTICOAGULANT: ICD-10-CM

## 2022-11-02 DIAGNOSIS — Z79.01 LONG TERM CURRENT USE OF ANTICOAGULANT: Primary | ICD-10-CM

## 2022-11-02 LAB — INR BLD: 2.3 (ref 0.9–1.1)

## 2022-11-02 PROCEDURE — 85610 PROTHROMBIN TIME: CPT

## 2022-11-02 PROCEDURE — 36416 COLLJ CAPILLARY BLOOD SPEC: CPT

## 2022-11-02 NOTE — PROGRESS NOTES
ANTICOAGULATION MANAGEMENT     Belkis Christopher 46 year old female is on warfarin with therapeutic INR result. (Goal INR 2.0-3.0)    Recent labs: (last 7 days)     11/02/22  1545   INR 2.3*       ASSESSMENT       Source(s): Chart Review and Patient/Caregiver Call       Warfarin doses taken: more warfarin taken than expected    Diet: No new diet changes identified    New illness, injury, or hospitalization: No    Medication/supplement changes: None noted    Signs or symptoms of bleeding or clotting: No    Previous INR: Subtherapeutic    Additional findings: None     PLAN     Recommended plan for temporary change(s) affecting INR     Dosing Instructions: Continue your current warfarin dose with next INR in 1 week       Summary  As of 11/2/2022    Full warfarin instructions:  2.5 mg every Mon, Fri; 5 mg all other days; Starting 11/2/2022   Next INR check:  11/9/2022             Telephone call with Belkis who verbalizes understanding and agrees to plan    Declined to schedule at this time.    Education provided:     Please call back if any changes to your diet, medications or how you've been taking warfarin    Plan made per [facility] anticoagulation protocol    Vira Gan RN  Anticoagulation Clinic  11/2/2022    _______________________________________________________________________     Anticoagulation Episode Summary     Current INR goal:  2.0-3.0   TTR:  86.4 % (1 y)   Target end date:  Indefinite   Send INR reminders to:  [facility]    Indications    Long term current use of anticoagulant [Z79.01]  Cerebral artery occlusion with cerebral infarction (H) (Resolved) [I63.50]  Primary hypercoagulable state (H) [D68.59]  History of stroke [Z86.73]           Comments:           Anticoagulation Care Providers     Provider Role Specialty Phone number    Karina Shah DO Referring Internal Medicine [phone]

## 2022-11-15 ENCOUNTER — ANTICOAGULATION THERAPY VISIT (OUTPATIENT)
Dept: ANTICOAGULATION | Facility: CLINIC | Age: 47
End: 2022-11-15

## 2022-11-15 ENCOUNTER — LAB (OUTPATIENT)
Dept: LAB | Facility: CLINIC | Age: 47
End: 2022-11-15
Payer: COMMERCIAL

## 2022-11-15 DIAGNOSIS — Z79.01 LONG TERM CURRENT USE OF ANTICOAGULANT: Primary | ICD-10-CM

## 2022-11-15 DIAGNOSIS — Z79.01 LONG TERM CURRENT USE OF ANTICOAGULANT: ICD-10-CM

## 2022-11-15 DIAGNOSIS — Z86.73 HISTORY OF STROKE: ICD-10-CM

## 2022-11-15 DIAGNOSIS — D68.59 PRIMARY HYPERCOAGULABLE STATE (H): ICD-10-CM

## 2022-11-15 LAB — INR BLD: 2.6 (ref 0.9–1.1)

## 2022-11-15 PROCEDURE — 85610 PROTHROMBIN TIME: CPT

## 2022-11-15 PROCEDURE — 36415 COLL VENOUS BLD VENIPUNCTURE: CPT

## 2022-11-15 NOTE — PROGRESS NOTES
ANTICOAGULATION MANAGEMENT     Belkis Christopher 46 year old female is on warfarin with therapeutic INR result. (Goal INR 2.0-3.0)    Recent labs: (last 7 days)     11/15/22  1512   INR 2.6*       ASSESSMENT       Source(s): Chart Review and Patient/Caregiver Call       Warfarin doses taken: Warfarin taken as instructed    Diet: No new diet changes identified    New illness, injury, or hospitalization: No    Medication/supplement changes: None noted    Signs or symptoms of bleeding or clotting: No    Previous INR: Therapeutic last visit; previously outside of goal range    Additional findings: None       PLAN     Recommended plan for no diet, medication or health factor changes affecting INR     Dosing Instructions: Continue your current warfarin dose with next INR in 3 weeks       Summary  As of 11/15/2022    Full warfarin instructions:  2.5 mg every Mon, Fri; 5 mg all other days; Starting 11/15/2022   Next INR check:  12/6/2022             Telephone call with Belkis who verbalizes understanding and agrees to plan    Patient offered & declined to schedule next visit    Education provided:     Contact [phone]  with any changes, questions or concerns.     Plan made per [facility] anticoagulation protocol    Marii Temple RN  Anticoagulation Clinic  11/15/2022    _______________________________________________________________________     Anticoagulation Episode Summary     Current INR goal:  2.0-3.0   TTR:  86.4 % (1 y)   Target end date:  Indefinite   Send INR reminders to:  [facility]    Indications    Long term current use of anticoagulant [Z79.01]  Cerebral artery occlusion with cerebral infarction (H) (Resolved) [I63.50]  Primary hypercoagulable state (H) [D68.59]  History of stroke [Z86.73]           Comments:           Anticoagulation Care Providers     Provider Role Specialty Phone number    Karina Shah DO Referring Internal Medicine [phone]

## 2022-12-16 ENCOUNTER — LAB (OUTPATIENT)
Dept: LAB | Facility: CLINIC | Age: 47
End: 2022-12-16
Payer: COMMERCIAL

## 2022-12-16 ENCOUNTER — ANTICOAGULATION THERAPY VISIT (OUTPATIENT)
Dept: ANTICOAGULATION | Facility: CLINIC | Age: 47
End: 2022-12-16

## 2022-12-16 DIAGNOSIS — Z79.01 LONG TERM CURRENT USE OF ANTICOAGULANT: Primary | ICD-10-CM

## 2022-12-16 DIAGNOSIS — D68.59 PRIMARY HYPERCOAGULABLE STATE (H): ICD-10-CM

## 2022-12-16 DIAGNOSIS — Z86.73 HISTORY OF STROKE: ICD-10-CM

## 2022-12-16 DIAGNOSIS — Z79.01 LONG TERM CURRENT USE OF ANTICOAGULANT: ICD-10-CM

## 2022-12-16 LAB — INR BLD: 4 (ref 0.9–1.1)

## 2022-12-16 PROCEDURE — 36416 COLLJ CAPILLARY BLOOD SPEC: CPT

## 2022-12-16 PROCEDURE — 85610 PROTHROMBIN TIME: CPT

## 2022-12-16 NOTE — PROGRESS NOTES
ANTICOAGULATION MANAGEMENT     Belkis Christopher 47 year old female is on warfarin with supratherapeutic INR result. (Goal INR 2.0-3.0)    Recent labs: (last 7 days)     12/16/22  1327   INR 4.0*       ASSESSMENT       Source(s): Chart Review    Previous INR was Therapeutic last 2(+) visits    Medication, diet, health changes since last INR chart reviewed; none identified           PLAN     Unable to reach Belkis today.    Left message to hold warfarin tonight. Request call back for assessment. Mychart sent as well.    Follow up required to confirm warfarin dose taken and assess for changes and discuss out of range result     Deb Roman RN  Anticoagulation Clinic  12/16/2022

## 2022-12-19 NOTE — PROGRESS NOTES
ANTICOAGULATION MANAGEMENT     Belkis Christopher 47 year old female is on warfarin with supratherapeutic INR result. (Goal INR 2.0-3.0)    Recent labs: (last 7 days)     12/16/22  1327   INR 4.0*       ASSESSMENT       Source(s): Chart Review and Patient/Caregiver Call       Warfarin doses taken: Warfarin taken as instructed    Diet: Change in alcohol intake may be affecting INR. patient confirmed via [employer]     New illness, injury, or hospitalization: No    Medication/supplement changes: None noted    Signs or symptoms of bleeding or clotting: No    Previous INR: Therapeutic last 2(+) visits    Additional findings: None       PLAN     Recommended plan for temporary change(s) affecting INR     Dosing Instructions: hold dose then continue your current warfarin dose with next INR in 1 week       Summary  As of 12/16/2022    Full warfarin instructions:  12/16: Hold; Otherwise 2.5 mg every Mon, Fri; 5 mg all other days; Starting 12/16/2022   Next INR check:  12/23/2022             Sent [employer] message with dosing and follow up instructions    Contact [phone]  to schedule and with any changes, questions or concerns.     Education provided:     Please call back if any changes to your diet, medications or how you've been taking warfarin    Plan made per [facility] anticoagulation protocol    Marii Temple RN  Anticoagulation Clinic  12/19/2022    _______________________________________________________________________     Anticoagulation Episode Summary     Current INR goal:  2.0-3.0   TTR:  80.1 % (1 y)   Target end date:  Indefinite   Send INR reminders to:  [facility]    Indications    Long term current use of anticoagulant [Z79.01]  Cerebral artery occlusion with cerebral infarction (H) (Resolved) [I63.50]  Primary hypercoagulable state (H) [D68.59]  History of stroke [Z86.73]           Comments:           Anticoagulation Care Providers     Provider Role Specialty Phone number    Karina Shah DO  Referring Internal Medicine [phone]

## 2022-12-29 ENCOUNTER — LAB (OUTPATIENT)
Dept: LAB | Facility: CLINIC | Age: 47
End: 2022-12-29
Payer: COMMERCIAL

## 2022-12-29 ENCOUNTER — ANTICOAGULATION THERAPY VISIT (OUTPATIENT)
Dept: ANTICOAGULATION | Facility: CLINIC | Age: 47
End: 2022-12-29

## 2022-12-29 DIAGNOSIS — D68.59 PRIMARY HYPERCOAGULABLE STATE (H): ICD-10-CM

## 2022-12-29 DIAGNOSIS — Z79.01 LONG TERM CURRENT USE OF ANTICOAGULANT: Primary | ICD-10-CM

## 2022-12-29 DIAGNOSIS — Z79.01 LONG TERM CURRENT USE OF ANTICOAGULANT: ICD-10-CM

## 2022-12-29 DIAGNOSIS — Z86.73 HISTORY OF STROKE: ICD-10-CM

## 2022-12-29 LAB — INR BLD: 2.4 (ref 0.9–1.1)

## 2022-12-29 PROCEDURE — 36416 COLLJ CAPILLARY BLOOD SPEC: CPT

## 2022-12-29 PROCEDURE — 85610 PROTHROMBIN TIME: CPT

## 2022-12-29 NOTE — PROGRESS NOTES
ANTICOAGULATION MANAGEMENT     Belkis Christopher 47 year old female is on warfarin with therapeutic INR result. (Goal INR 2.0-3.0)    Recent labs: (last 7 days)     12/29/22  1544   INR 2.4*       ASSESSMENT       Source(s): Chart Review    Previous INR was Supratherapeutic    Medication, diet, health changes since last INR chart reviewed; none identified           PLAN     Recommended plan for no diet, medication or health factor changes affecting INR     Dosing Instructions: Continue your current warfarin dose with next INR in 3 weeks       Summary  As of 12/29/2022    Full warfarin instructions:  2.5 mg every Mon, Fri; 5 mg all other days   Next INR check:  1/19/2023             Sent [employer] message with dosing and follow up instructions. This is patient's preferred method of communication.    Contact [phone]  to schedule and with any changes, questions or concerns.     Education provided:     Please call back if any changes to your diet, medications or how you've been taking warfarin    Contact [phone]  with any changes, questions or concerns.     Plan made per [facility] anticoagulation protocol    Deb Roman RN  Anticoagulation Clinic  12/29/2022    _______________________________________________________________________     Anticoagulation Episode Summary     Current INR goal:  2.0-3.0   TTR:  78.1 % (1 y)   Target end date:  Indefinite   [facility]    Indications    Long term current use of anticoagulant [Z79.01]  Cerebral artery occlusion with cerebral infarction (H) (Resolved) [I63.50]  Primary hypercoagulable state (H) [D68.59]  History of stroke [Z86.73]           Comments:           Anticoagulation Care Providers     Provider Role Specialty Phone number    Karina Shah DO Referring Internal Medicine [phone]

## 2023-01-20 ENCOUNTER — TELEPHONE (OUTPATIENT)
Dept: FAMILY MEDICINE | Facility: CLINIC | Age: 48
End: 2023-01-20
Payer: COMMERCIAL

## 2023-01-20 NOTE — TELEPHONE ENCOUNTER
Patient Quality Outreach    Patient is due for the following:   Colon Cancer Screening  Cervical Cancer Screening - PAP Needed  Physical Preventive Adult Physical    Next Steps:   Schedule a Adult Preventative    Type of outreach:    Sent [employer] message.    Next Steps:  Reach out within 90 days via Letter.    Max number of attempts reached: No. Will try again in 90 days if patient still on fail list.    Questions for provider review:    None     SHEILA Jin LPN           Problem: Breastfeeding  Goal: Establish breastfeeding  Outcome: PROGRESSING AS EXPECTED  MOB breast fed x2 this shift. Infant with good latch, audible suck and swallows.     Problem: Knowledge deficit - Parent/Caregiver  Goal: Family verbalizes understanding of infant's condition    Intervention: Inform parents of plan of care  POB updated on plan of care at bedside. All questions answered at this time.      Problem: Psychosocial/Developmental  Goal: Support Parent-Infant attachment, Reduce parental anxiety    Intervention: Encourage participation in providing care  POB comfortable providing care at this time.       Problem: Oxygenation/Respiratory Function  Goal: Optimized air exchange    Intervention: Assess respiratory rate, effort, breathing pattern and oxygenation  Infant remains on 50ml LFNC. Infant tolerating well. No apnea, bradycardia, or desaturations noted so far this shift.

## 2023-01-27 ENCOUNTER — TELEPHONE (OUTPATIENT)
Dept: ANTICOAGULATION | Facility: CLINIC | Age: 48
End: 2023-01-27
Payer: COMMERCIAL

## 2023-02-03 ENCOUNTER — DOCUMENTATION ONLY (OUTPATIENT)
Dept: ANTICOAGULATION | Facility: CLINIC | Age: 48
End: 2023-02-03
Payer: COMMERCIAL

## 2023-02-03 NOTE — LETTER
February 3, 2023        Belkis Christopher  [address]  [place] [address]            Dear Belkis,    You are currently under the care of [facility] for your warfarin (Coumadin , Jantoven ) therapy.  We are contacting you because our records show you were due for an INR on 1/19/23.    There are potentially serious risks when taking warfarin without careful monitoring and we want to make sure you are safely managed.  Routine lab monitoring is required for warfarin refills.     Please call [phone]  as soon as possible to schedule an appointment.  If there has been a change in your care or other concerns, please let us know so we can help and or update our records.     Sincerely,       [facility]

## 2023-02-03 NOTE — PROGRESS NOTES
ANTICOAGULATION     Belkis Christopher is overdue for INR check.      Reminder letter sent    Christian Cochran RN       Patient presented requesting covid-19 testing. Patient asymptomatic - denies chest pain, dyspnea, fever, cough. denies recent travel . Pt had a possible exposure to a +[employer] person at work. Patient presented requesting covid-19 testing. Patient asymptomatic - denies chest pain, dyspnea, fever, cough. denies recent travel . Pt had a possible exposure to a +COVID person at work.

## 2023-02-17 ENCOUNTER — LAB (OUTPATIENT)
Dept: LAB | Facility: CLINIC | Age: 48
End: 2023-02-17
Payer: COMMERCIAL

## 2023-02-17 ENCOUNTER — ANTICOAGULATION THERAPY VISIT (OUTPATIENT)
Dept: ANTICOAGULATION | Facility: CLINIC | Age: 48
End: 2023-02-17

## 2023-02-17 DIAGNOSIS — Z79.01 LONG TERM CURRENT USE OF ANTICOAGULANT: ICD-10-CM

## 2023-02-17 DIAGNOSIS — D68.59 PRIMARY HYPERCOAGULABLE STATE (H): ICD-10-CM

## 2023-02-17 DIAGNOSIS — Z86.73 HISTORY OF STROKE: ICD-10-CM

## 2023-02-17 DIAGNOSIS — Z79.01 LONG TERM CURRENT USE OF ANTICOAGULANT: Primary | ICD-10-CM

## 2023-02-17 LAB — INR BLD: 3.5 (ref 0.9–1.1)

## 2023-02-17 PROCEDURE — 36416 COLLJ CAPILLARY BLOOD SPEC: CPT

## 2023-02-17 PROCEDURE — 85610 PROTHROMBIN TIME: CPT

## 2023-02-17 NOTE — PROGRESS NOTES
ANTICOAGULATION MANAGEMENT     Belkis Christopher 47 year old female is on warfarin with supratherapeutic INR result. (Goal INR 2.0-3.0)    Recent labs: (last 7 days)     02/17/23  1444   INR 3.5*       ASSESSMENT       Source(s): Chart review    Previous INR: Therapeutic last visit; previously outside of goal range    Additional findings: None     PLAN     Unable to reach pt today.    Left message to hold warfarin tonight. Request call back for assessment. Take 5 mg on Sat and Sun. Return call to [facility] or be seen for concerns.    Follow up required to confirm warfarin dose taken and assess for changes and discuss out of range result     Vira Gan RN  Anticoagulation Clinic  2/17/2023

## 2023-02-20 NOTE — PROGRESS NOTES
ANTICOAGULATION MANAGEMENT     Belkis Christopher 47 year old female is on warfarin with supratherapeutic INR result. (Goal INR 2.0-3.0)    Recent labs: (last 7 days)     02/17/23  1444   INR 3.5*       ASSESSMENT       Source(s): Chart Review and Patient/Caregiver Call       Warfarin doses taken: Warfarin taken as instructed, followed instructions via vm left on friday    Diet: No new diet changes identified    New illness, injury, or hospitalization: No    Medication/supplement changes: None noted    Signs or symptoms of bleeding or clotting: No    Previous INR: Therapeutic last visit; previously outside of goal range    Additional findings: None       PLAN     Recommended plan for no diet, medication or health factor changes affecting INR     Dosing Instructions: hold dose then decrease your warfarin dose (8.3% change) with next INR in 2 weeks       Summary  As of 2/17/2023    Full warfarin instructions:  2/17: Hold; Otherwise 2.5 mg every Mon, Wed, Fri; 5 mg all other days   Next INR check:  3/3/2023             Telephone call with Belkis who verbalizes understanding and agrees to plan    Lab visit scheduled    Education provided:     Please call back if any changes to your diet, medications or how you've been taking warfarin    Goal range and lab monitoring: goal range and significance of current result, Importance of therapeutic range and Importance of following up at instructed interval    Plan made per [facility] anticoagulation protocol    Laurie Bell RN  Anticoagulation Clinic  2/20/2023    _______________________________________________________________________     Anticoagulation Episode Summary     Current INR goal:  2.0-3.0   TTR:  71.6 % (1 y)   Target end date:  Indefinite   Send INR reminders to:  [facility]    Indications    Long term current use of anticoagulant [Z79.01]  Cerebral artery occlusion with cerebral infarction (H) (Resolved) [I63.50]  Primary hypercoagulable state (H) [D68.59]  History of  stroke [Z86.73]           Comments:           Anticoagulation Care Providers     Provider Role Specialty Phone number    Karina Shah DO Referring Internal Medicine [phone]

## 2023-03-02 ENCOUNTER — LAB (OUTPATIENT)
Dept: LAB | Facility: CLINIC | Age: 48
End: 2023-03-02
Payer: COMMERCIAL

## 2023-03-02 ENCOUNTER — ANTICOAGULATION THERAPY VISIT (OUTPATIENT)
Dept: ANTICOAGULATION | Facility: CLINIC | Age: 48
End: 2023-03-02

## 2023-03-02 DIAGNOSIS — D68.59 PRIMARY HYPERCOAGULABLE STATE (H): ICD-10-CM

## 2023-03-02 DIAGNOSIS — Z86.73 HISTORY OF STROKE: ICD-10-CM

## 2023-03-02 DIAGNOSIS — Z79.01 LONG TERM CURRENT USE OF ANTICOAGULANT: ICD-10-CM

## 2023-03-02 DIAGNOSIS — Z79.01 LONG TERM CURRENT USE OF ANTICOAGULANT: Primary | ICD-10-CM

## 2023-03-02 LAB — INR BLD: 3 (ref 0.9–1.1)

## 2023-03-02 PROCEDURE — 85610 PROTHROMBIN TIME: CPT

## 2023-03-02 PROCEDURE — 36416 COLLJ CAPILLARY BLOOD SPEC: CPT

## 2023-03-02 RX ORDER — WARFARIN SODIUM 5 MG/1
TABLET ORAL
Qty: 70 TABLET | Refills: 1 | Status: SHIPPED | OUTPATIENT
Start: 2023-03-02 | End: 2023-09-19

## 2023-03-02 NOTE — PROGRESS NOTES
ANTICOAGULATION MANAGEMENT     Belkis Christopher 47 year old female is on warfarin with therapeutic INR result. (Goal INR 2.0-3.0)    Recent labs: (last 7 days)     03/02/23  1528   INR 3.0*       ASSESSMENT       Source(s): Chart Review and Patient/Caregiver Call       Warfarin doses taken: Warfarin taken as instructed    Diet: No new diet changes identified    New illness, injury, or hospitalization: No    Medication/supplement changes: None noted    Signs or symptoms of bleeding or clotting: No    Previous INR: Supratherapeutic    Additional findings: Refill needed today. Belkis meets all criteria for refill (current [facility] referral, office visit with referring provider/group in last year, lab monitoring up to date or not exceeding 2 weeks overdue). Rx instructions and quantity supplied updated to match patient's current dosing plan. Warfarin 90 day supply with 1 refill granted per [facility] protocol          PLAN     Recommended plan for no diet, medication or health factor changes affecting INR     Dosing Instructions: Continue your current warfarin dose with next INR in 3 weeks       Summary  As of 3/2/2023    Full warfarin instructions:  2.5 mg every Mon, Wed, Fri; 5 mg all other days   Next INR check:  3/22/2023             Telephone call with Belkis who verbalizes understanding and agrees to plan    Lab visit scheduled    Education provided:     Contact [phone]  with any changes, questions or concerns.     Plan made per [facility] anticoagulation protocol    Marii Temple RN  Anticoagulation Clinic  3/2/2023    _______________________________________________________________________     Anticoagulation Episode Summary     Current INR goal:  2.0-3.0   TTR:  68.3 % (1 y)   Target end date:  Indefinite   Send INR reminders to:  [facility]    Indications    Long term current use of anticoagulant [Z79.01]  Cerebral artery occlusion with cerebral infarction (H) (Resolved) [I63.50]  Primary hypercoagulable state (H)  [D68.59]  History of stroke [Z86.73]           Comments:           Anticoagulation Care Providers     Provider Role Specialty Phone number    Karina Shah DO Referring Internal Medicine [phone]

## 2023-03-22 ENCOUNTER — ANTICOAGULATION THERAPY VISIT (OUTPATIENT)
Dept: ANTICOAGULATION | Facility: CLINIC | Age: 48
End: 2023-03-22

## 2023-03-22 ENCOUNTER — LAB (OUTPATIENT)
Dept: LAB | Facility: CLINIC | Age: 48
End: 2023-03-22
Payer: COMMERCIAL

## 2023-03-22 DIAGNOSIS — D68.59 PRIMARY HYPERCOAGULABLE STATE (H): ICD-10-CM

## 2023-03-22 DIAGNOSIS — Z86.73 HISTORY OF STROKE: ICD-10-CM

## 2023-03-22 DIAGNOSIS — Z79.01 LONG TERM CURRENT USE OF ANTICOAGULANT: ICD-10-CM

## 2023-03-22 DIAGNOSIS — Z79.01 LONG TERM CURRENT USE OF ANTICOAGULANT: Primary | ICD-10-CM

## 2023-03-22 LAB — INR BLD: 2.3 (ref 0.9–1.1)

## 2023-03-22 PROCEDURE — 85610 PROTHROMBIN TIME: CPT

## 2023-03-22 PROCEDURE — 36415 COLL VENOUS BLD VENIPUNCTURE: CPT

## 2023-03-22 NOTE — PROGRESS NOTES
ANTICOAGULATION MANAGEMENT     Belkis Christopher 47 year old female is on warfarin with therapeutic INR result. (Goal INR 2.0-3.0)    Recent labs: (last 7 days)     03/22/23  1549   INR 2.3*       ASSESSMENT       Source(s): Chart Review    Previous INR was Therapeutic last 2(+) visits    Medication, diet, health changes since last INR chart reviewed; none identified             PLAN     Recommended plan for no diet, medication or health factor changes affecting INR     Dosing Instructions: Continue your current warfarin dose with next INR in 4 weeks       Summary  As of 3/22/2023    Full warfarin instructions:  2.5 mg every Mon, Wed, Fri; 5 mg all other days   Next INR check:  4/19/2023             Detailed voice message left for Belkis with dosing instructions and follow up date.   Sent [employer] message with dosing and follow up instructions    Contact [phone]  to schedule and with any changes, questions or concerns.     Education provided:     Please call back if any changes to your diet, medications or how you've been taking warfarin    Goal range and lab monitoring: goal range and significance of current result, Importance of therapeutic range and Importance of following up at instructed interval    Plan made per [facility] anticoagulation protocol    Laurie Bell, RN  Anticoagulation Clinic  3/22/2023    _______________________________________________________________________     Anticoagulation Episode Summary     Current INR goal:  2.0-3.0   TTR:  68.3 % (1 y)   Target end date:  Indefinite   [facility]    Indications    Long term current use of anticoagulant [Z79.01]  Cerebral artery occlusion with cerebral infarction (H) (Resolved) [I63.50]  Primary hypercoagulable state (H) [D68.59]  History of stroke [Z86.73]           Comments:           Anticoagulation Care Providers     Provider Role Specialty Phone number    Karina Shah DO Referring Internal Medicine [phone]

## 2023-05-03 ENCOUNTER — TELEPHONE (OUTPATIENT)
Dept: ANTICOAGULATION | Facility: CLINIC | Age: 48
End: 2023-05-03
Payer: COMMERCIAL

## 2023-05-03 NOTE — TELEPHONE ENCOUNTER
ANTICOAGULATION     Belkis Christopher is overdue for INR check.      Left message for patient to call and schedule lab appointment as soon as possible. If returning call, please schedule.     Vira Gan RN

## 2023-05-10 ENCOUNTER — ANTICOAGULATION THERAPY VISIT (OUTPATIENT)
Dept: ANTICOAGULATION | Facility: CLINIC | Age: 48
End: 2023-05-10

## 2023-05-10 ENCOUNTER — LAB (OUTPATIENT)
Dept: LAB | Facility: CLINIC | Age: 48
End: 2023-05-10
Payer: COMMERCIAL

## 2023-05-10 DIAGNOSIS — Z86.73 HISTORY OF STROKE: ICD-10-CM

## 2023-05-10 DIAGNOSIS — Z79.01 LONG TERM CURRENT USE OF ANTICOAGULANT: Primary | ICD-10-CM

## 2023-05-10 DIAGNOSIS — D68.59 PRIMARY HYPERCOAGULABLE STATE (H): ICD-10-CM

## 2023-05-10 DIAGNOSIS — Z79.01 LONG TERM CURRENT USE OF ANTICOAGULANT: ICD-10-CM

## 2023-05-10 LAB — INR BLD: 1.2 (ref 0.9–1.1)

## 2023-05-10 PROCEDURE — 36416 COLLJ CAPILLARY BLOOD SPEC: CPT

## 2023-05-10 PROCEDURE — 85610 PROTHROMBIN TIME: CPT

## 2023-05-10 NOTE — PROGRESS NOTES
ANTICOAGULATION MANAGEMENT     Belkis Christopher 47 year old female is on warfarin with subtherapeutic INR result. (Goal INR 2.0-3.0)    Recent labs: (last 7 days)     05/10/23  1542   INR 1.2*       ASSESSMENT       Source(s): Chart Review    Previous INR was Therapeutic last 2(+) visits    Medication, diet, health changes since last INR chart reviewed; none identified             PLAN     Unable to reach Belkis today.    Left message to take a booster dose of warfarin,  5 mg tonight. Request call back for assessment.    Follow up required to confirm warfarin dose taken and assess for changes and discuss out of range result     Norma White RN  Anticoagulation Clinic  5/10/2023

## 2023-05-11 NOTE — PROGRESS NOTES
ANTICOAGULATION MANAGEMENT     Belkis Christopher 47 year old female is on warfarin with subtherapeutic INR result. (Goal INR 2.0-3.0)    Recent labs: (last 7 days)     05/10/23  1542   INR 1.2*       ASSESSMENT       Source(s): Chart Review    Previous INR was Therapeutic last 2(+) visits    Medication, diet, health changes since last INR chart reviewed; none identified             PLAN     Recommended plan for no diet, medication or health factor changes affecting INR     Dosing Instructions: booster dose then continue your current warfarin dose with next INR in 1 week       Summary  As of 5/10/2023    Full warfarin instructions:  5/10: 5 mg; Otherwise 2.5 mg every Mon, Wed, Fri; 5 mg all other days   Next INR check:  5/17/2023             Detailed voice message left for Belkis with dosing instructions and follow up date.   Sent [employer] message with dosing and follow up instructions    Contact [phone]  to schedule and with any changes, questions or concerns.     Education provided:     Please call back if any changes to your diet, medications or how you've been taking warfarin    Contact [phone]  with any changes, questions or concerns.     Plan made per [facility] anticoagulation protocol    Christian WHITTAKER RN  Anticoagulation Clinic  5/11/2023    _______________________________________________________________________     Anticoagulation Episode Summary     Current INR goal:  2.0-3.0   TTR:  58.4 % (1 y)   Target end date:  Indefinite   Send INR reminders to:  [facility]    Indications    Long term current use of anticoagulant [Z79.01]  Cerebral artery occlusion with cerebral infarction (H) (Resolved) [I63.50]  Primary hypercoagulable state (H) [D68.59]  History of stroke [Z86.73]           Comments:           Anticoagulation Care Providers     Provider Role Specialty Phone number    Karina Shah DO Referring Internal Medicine [phone]

## 2023-05-24 ENCOUNTER — LAB (OUTPATIENT)
Dept: LAB | Facility: CLINIC | Age: 48
End: 2023-05-24
Payer: COMMERCIAL

## 2023-05-24 ENCOUNTER — ANTICOAGULATION THERAPY VISIT (OUTPATIENT)
Dept: ANTICOAGULATION | Facility: CLINIC | Age: 48
End: 2023-05-24

## 2023-05-24 DIAGNOSIS — Z79.01 LONG TERM CURRENT USE OF ANTICOAGULANT: ICD-10-CM

## 2023-05-24 DIAGNOSIS — Z86.73 HISTORY OF STROKE: ICD-10-CM

## 2023-05-24 DIAGNOSIS — D68.59 PRIMARY HYPERCOAGULABLE STATE (H): ICD-10-CM

## 2023-05-24 DIAGNOSIS — Z79.01 LONG TERM CURRENT USE OF ANTICOAGULANT: Primary | ICD-10-CM

## 2023-05-24 LAB — INR BLD: 2.2 (ref 0.9–1.1)

## 2023-05-24 PROCEDURE — 36416 COLLJ CAPILLARY BLOOD SPEC: CPT

## 2023-05-24 PROCEDURE — 85610 PROTHROMBIN TIME: CPT

## 2023-05-24 NOTE — PROGRESS NOTES
ANTICOAGULATION MANAGEMENT     Belkis Christopher 47 year old female is on warfarin with therapeutic INR result. (Goal INR 2.0-3.0)    Recent labs: (last 7 days)     05/24/23  1607   INR 2.2*       ASSESSMENT       Source(s): Chart Review    Previous INR was Subtherapeutic    Medication, diet, health changes since last INR chart reviewed; none identified             PLAN     Recommended plan for no diet, medication or health factor changes affecting INR     Dosing Instructions: Continue your current warfarin dose with next INR in 3 weeks       Summary  As of 5/24/2023    Full warfarin instructions:  2.5 mg every Mon, Wed, Fri; 5 mg all other days   Next INR check:  6/14/2023             Detailed voice message left for Belkis with dosing instructions and follow up date.     Contact [phone]  to schedule and with any changes, questions or concerns.     Education provided:     Please call back if any changes to your diet, medications or how you've been taking warfarin    Goal range and lab monitoring: goal range and significance of current result    Plan made per [facility] anticoagulation protocol    Norma White RN  Anticoagulation Clinic  5/24/2023    _______________________________________________________________________     Anticoagulation Episode Summary     Current INR goal:  2.0-3.0   TTR:  55.5 % (1 y)   Target end date:  Indefinite   Send INR reminders to:  [facility]    Indications    Long term current use of anticoagulant [Z79.01]  Cerebral artery occlusion with cerebral infarction (H) (Resolved) [I63.50]  Primary hypercoagulable state (H) [D68.59]  History of stroke [Z86.73]           Comments:           Anticoagulation Care Providers     Provider Role Specialty Phone number    Karina Shah DO Referring Internal Medicine [phone]

## 2023-06-03 ENCOUNTER — HEALTH MAINTENANCE LETTER (OUTPATIENT)
Age: 48
End: 2023-06-03

## 2023-06-29 ENCOUNTER — TELEPHONE (OUTPATIENT)
Dept: ANTICOAGULATION | Facility: CLINIC | Age: 48
End: 2023-06-29
Payer: COMMERCIAL

## 2023-06-29 NOTE — TELEPHONE ENCOUNTER
ANTICOAGULATION     Belkis Christopher is overdue for INR check. Last reminder on MyChart (patient's preferred method of contact) was not read.     Left message for patient to call and schedule lab appointment as soon as possible. If returning call, please schedule.     Pratibha Murcia RN

## 2023-07-12 ENCOUNTER — ANTICOAGULATION THERAPY VISIT (OUTPATIENT)
Dept: ANTICOAGULATION | Facility: CLINIC | Age: 48
End: 2023-07-12

## 2023-07-12 ENCOUNTER — LAB (OUTPATIENT)
Dept: LAB | Facility: CLINIC | Age: 48
End: 2023-07-12
Payer: COMMERCIAL

## 2023-07-12 DIAGNOSIS — Z86.73 HISTORY OF STROKE: ICD-10-CM

## 2023-07-12 DIAGNOSIS — Z79.01 LONG TERM CURRENT USE OF ANTICOAGULANT: ICD-10-CM

## 2023-07-12 DIAGNOSIS — Z79.01 LONG TERM CURRENT USE OF ANTICOAGULANT: Primary | ICD-10-CM

## 2023-07-12 DIAGNOSIS — D68.59 PRIMARY HYPERCOAGULABLE STATE (H): ICD-10-CM

## 2023-07-12 LAB — INR BLD: 2.2 (ref 0.9–1.1)

## 2023-07-12 PROCEDURE — 85610 PROTHROMBIN TIME: CPT

## 2023-07-12 PROCEDURE — 36416 COLLJ CAPILLARY BLOOD SPEC: CPT

## 2023-07-12 NOTE — PROGRESS NOTES
ANTICOAGULATION MANAGEMENT     Belkis Christopher 47 year old female is on warfarin with therapeutic INR result. (Goal INR 2.0-3.0)    Recent labs: (last 7 days)     07/12/23  1535   INR 2.2*       ASSESSMENT       Source(s): Chart Review and Patient/Caregiver Call       Warfarin doses taken: Warfarin taken as instructed    Diet: No new diet changes identified    Medication/supplement changes: None noted    New illness, injury, or hospitalization: No    Signs or symptoms of bleeding or clotting: No    Previous result: Therapeutic last visit; previously outside of goal range    Additional findings: None         PLAN     Recommended plan for no diet, medication or health factor changes affecting INR     Dosing Instructions: Continue your current warfarin dose with next INR in 4 weeks       Summary  As of 7/12/2023    Full warfarin instructions:  2.5 mg every Mon, Wed, Fri; 5 mg all other days   Next INR check:  8/9/2023             Telephone call with Belkis who verbalizes understanding and agrees to plan    Patient offered & declined to schedule next visit    Education provided:     Goal range and lab monitoring: goal range and significance of current result    Plan made per [facility] anticoagulation protocol    Norma White RN  Anticoagulation Clinic  7/12/2023    _______________________________________________________________________     Anticoagulation Episode Summary     Current INR goal:  2.0-3.0   TTR:  55.5 % (1 y)   Target end date:  Indefinite   Send INR reminders to:  [facility]    Indications    Long term current use of anticoagulant [Z79.01]  Cerebral artery occlusion with cerebral infarction (H) (Resolved) [I63.50]  Primary hypercoagulable state (H) [D68.59]  History of stroke [Z86.73]           Comments:           Anticoagulation Care Providers     Provider Role Specialty Phone number    Karina Shah DO Referring Internal Medicine [phone]

## 2023-07-27 ENCOUNTER — PATIENT OUTREACH (OUTPATIENT)
Dept: CARE COORDINATION | Facility: CLINIC | Age: 48
End: 2023-07-27
Payer: COMMERCIAL

## 2023-08-07 DIAGNOSIS — Z79.01 LONG TERM CURRENT USE OF ANTICOAGULANT: Primary | ICD-10-CM

## 2023-08-07 NOTE — PROGRESS NOTES
Belkis Villalta has an appointment with lab on 08/09. Her appointment note says it's for an INR. Can you please place an order for this?   Thank you,  [facility]

## 2023-08-15 ENCOUNTER — DOCUMENTATION ONLY (OUTPATIENT)
Dept: ANTICOAGULATION | Facility: CLINIC | Age: 48
End: 2023-08-15
Payer: COMMERCIAL

## 2023-08-15 DIAGNOSIS — Z79.01 LONG TERM CURRENT USE OF ANTICOAGULANT: Primary | ICD-10-CM

## 2023-08-15 DIAGNOSIS — D68.59 PRIMARY HYPERCOAGULABLE STATE (H): ICD-10-CM

## 2023-08-15 NOTE — PROGRESS NOTES
ANTICOAGULATION CLINIC REFERRAL RENEWAL REQUEST       An annual renewal order is required for all patients referred to [facility].?  Please review and sign the pended referral order for Belkis Christopher.       ANTICOAGULATION SUMMARY      Warfarin indication(s)   Stroke and hypercoaguable state, cerebral artery occlusion    Mechanical heart valve present?  NO       Current goal range   INR: 2.0-3.0     Goal appropriate for indication? Goal INR 2-3, standard for indication(s) above     Time in Therapeutic Range (TTR)  (Goal > 60%) 55.5%       Office visit with referring provider's group within last year No on 7/29/22 virtual visit with PCP       Marii Temple, RN  [facility]

## 2023-08-18 ENCOUNTER — LAB (OUTPATIENT)
Dept: LAB | Facility: CLINIC | Age: 48
End: 2023-08-18
Payer: COMMERCIAL

## 2023-08-18 ENCOUNTER — ANTICOAGULATION THERAPY VISIT (OUTPATIENT)
Dept: ANTICOAGULATION | Facility: CLINIC | Age: 48
End: 2023-08-18

## 2023-08-18 DIAGNOSIS — Z79.01 LONG TERM CURRENT USE OF ANTICOAGULANT: Primary | ICD-10-CM

## 2023-08-18 DIAGNOSIS — D68.59 PRIMARY HYPERCOAGULABLE STATE (H): ICD-10-CM

## 2023-08-18 DIAGNOSIS — Z79.01 LONG TERM CURRENT USE OF ANTICOAGULANT: ICD-10-CM

## 2023-08-18 DIAGNOSIS — Z86.73 HISTORY OF STROKE: ICD-10-CM

## 2023-08-18 LAB — INR BLD: 1.8 (ref 0.9–1.1)

## 2023-08-18 PROCEDURE — 36416 COLLJ CAPILLARY BLOOD SPEC: CPT | Performed by: INTERNAL MEDICINE

## 2023-08-18 PROCEDURE — 85610 PROTHROMBIN TIME: CPT | Performed by: INTERNAL MEDICINE

## 2023-08-18 NOTE — PROGRESS NOTES
ANTICOAGULATION MANAGEMENT     Belkis Christopher 47 year old female is on warfarin with subtherapeutic INR result. (Goal INR 2.0-3.0)    Recent labs: (last 7 days)     08/18/23  1353   INR 1.8*       ASSESSMENT     Source(s): Chart Review  Previous INR was Therapeutic last 2(+) visits  Medication, diet, health changes since last INR chart reviewed; none identified         PLAN     Unable to reach María today.    Left message to take a booster dose of warfarin,  5 mg tonight. Request call back for assessment.    Follow up required to confirm warfarin dose taken and assess for changes and discuss out of range result     Deb Roman RN  Anticoagulation Clinic  8/18/2023

## 2023-08-21 NOTE — PROGRESS NOTES
Unable to reach Belkis today x2, mychart currently unread. Left message to continue 2.5mg today and will try again tomorrow. Encouraged to respond to my chart message if able

## 2023-08-22 NOTE — PROGRESS NOTES
Closing encounter. INR result is four days old. [facility] attempted to reach patient 3 times in 2 days. Routing to provider as FYI. [employer] message unread.    Deb Roman RN, BSN, PHN  8-22-23

## 2023-08-24 ENCOUNTER — PATIENT OUTREACH (OUTPATIENT)
Dept: CARE COORDINATION | Facility: CLINIC | Age: 48
End: 2023-08-24
Payer: COMMERCIAL

## 2023-08-28 ENCOUNTER — TELEPHONE (OUTPATIENT)
Dept: ANTICOAGULATION | Facility: CLINIC | Age: 48
End: 2023-08-28
Payer: COMMERCIAL

## 2023-09-08 ENCOUNTER — LAB (OUTPATIENT)
Dept: LAB | Facility: CLINIC | Age: 48
End: 2023-09-08
Payer: COMMERCIAL

## 2023-09-08 ENCOUNTER — ANTICOAGULATION THERAPY VISIT (OUTPATIENT)
Dept: ANTICOAGULATION | Facility: CLINIC | Age: 48
End: 2023-09-08

## 2023-09-08 DIAGNOSIS — Z79.01 LONG TERM CURRENT USE OF ANTICOAGULANT: Primary | ICD-10-CM

## 2023-09-08 DIAGNOSIS — Z79.01 LONG TERM CURRENT USE OF ANTICOAGULANT: ICD-10-CM

## 2023-09-08 DIAGNOSIS — D68.59 PRIMARY HYPERCOAGULABLE STATE (H): ICD-10-CM

## 2023-09-08 DIAGNOSIS — Z86.73 HISTORY OF STROKE: ICD-10-CM

## 2023-09-08 LAB — INR BLD: 3 (ref 0.9–1.1)

## 2023-09-08 PROCEDURE — 36416 COLLJ CAPILLARY BLOOD SPEC: CPT

## 2023-09-08 PROCEDURE — 85610 PROTHROMBIN TIME: CPT

## 2023-09-08 NOTE — PROGRESS NOTES
ANTICOAGULATION MANAGEMENT     Belkis Christopher 47 year old female is on warfarin with therapeutic INR result. (Goal INR 2.0-3.0)    Recent labs: (last 7 days)     09/08/23  1509   INR 3.0*       ASSESSMENT     Source(s): Chart Review  Previous INR was Subtherapeutic  Medication, diet, health changes since last INR chart reviewed; none identified         PLAN     Recommended plan for no diet, medication or health factor changes affecting INR     Dosing Instructions: Continue your current warfarin dose with next INR in 4 weeks       Summary  As of 9/8/2023      Full warfarin instructions:  2.5 mg every Mon, Wed, Fri; 5 mg all other days   Next INR check:  10/6/2023               Detailed voice message left for Belkis with dosing instructions and follow up date.     Contact [phone]  to schedule and with any changes, questions or concerns.     Education provided:   Please call back if any changes to your diet, medications or how you've been taking warfarin  Goal range and lab monitoring: goal range and significance of current result    Plan made per [facility] anticoagulation protocol    Norma White RN  Anticoagulation Clinic  9/8/2023    _______________________________________________________________________     Anticoagulation Episode Summary       Current INR goal:  2.0-3.0   TTR:  49.5 % (1 y)   Target end date:  Indefinite   Send INR reminders to:  [facility]    Indications    Long term current use of anticoagulant [Z79.01]  Cerebral artery occlusion with cerebral infarction (H) (Resolved) [I63.50]  Primary hypercoagulable state (H) [D68.59]  History of stroke [Z86.73]             Comments:               Anticoagulation Care Providers       Provider Role Specialty Phone number    Karina Shah DO Referring Internal Medicine [phone]

## 2023-09-18 DIAGNOSIS — Z79.01 LONG TERM CURRENT USE OF ANTICOAGULANT: ICD-10-CM

## 2023-09-19 RX ORDER — WARFARIN SODIUM 5 MG/1
TABLET ORAL
Qty: 70 TABLET | Refills: 0 | Status: SHIPPED | OUTPATIENT
Start: 2023-09-19 | End: 2023-12-05

## 2023-10-11 ENCOUNTER — ANTICOAGULATION THERAPY VISIT (OUTPATIENT)
Dept: ANTICOAGULATION | Facility: CLINIC | Age: 48
End: 2023-10-11

## 2023-10-11 ENCOUNTER — LAB (OUTPATIENT)
Dept: LAB | Facility: CLINIC | Age: 48
End: 2023-10-11
Payer: COMMERCIAL

## 2023-10-11 DIAGNOSIS — Z79.01 LONG TERM CURRENT USE OF ANTICOAGULANT: ICD-10-CM

## 2023-10-11 DIAGNOSIS — D68.59 PRIMARY HYPERCOAGULABLE STATE (H): ICD-10-CM

## 2023-10-11 DIAGNOSIS — Z79.01 LONG TERM CURRENT USE OF ANTICOAGULANT: Primary | ICD-10-CM

## 2023-10-11 DIAGNOSIS — Z86.73 HISTORY OF STROKE: ICD-10-CM

## 2023-10-11 LAB — INR BLD: 2.1 (ref 0.9–1.1)

## 2023-10-11 PROCEDURE — 85610 PROTHROMBIN TIME: CPT

## 2023-10-11 PROCEDURE — 36416 COLLJ CAPILLARY BLOOD SPEC: CPT

## 2023-10-11 NOTE — PROGRESS NOTES
ANTICOAGULATION MANAGEMENT     Belkis Christopher 47 year old female is on warfarin with therapeutic INR result. (Goal INR 2.0-3.0)    Recent labs: (last 7 days)     10/11/23  1556   INR 2.1*       ASSESSMENT     Source(s): Chart Review   Previous result: Therapeutic last visit; previously outside of goal range  Additional findings: None     PLAN     Recommended plan for no diet, medication or health factor changes affecting INR     Dosing Instructions: Continue your current warfarin dose with next INR in 5 weeks       Summary  As of 10/11/2023      Full warfarin instructions:  2.5 mg every Mon, Wed, Fri; 5 mg all other days   Next INR check:  11/15/2023               Detailed voice message left for Belkis with dosing instructions and follow up date.     [employer] message also sent.    Contact [phone]  to schedule and with any changes, questions or concerns.     Education provided:   Please call back if any changes to your diet, medications or how you've been taking warfarin    Plan made per [facility] anticoagulation protocol    Vira Gan RN  Anticoagulation Clinic  10/11/2023    _______________________________________________________________________     Anticoagulation Episode Summary       Current INR goal:  2.0-3.0   TTR:  57.4% (1 y)   Target end date:  Indefinite   Send INR reminders to:  [facility]    Indications    Long term current use of anticoagulant [Z79.01]  Cerebral artery occlusion with cerebral infarction (H) (Resolved) [I63.50]  Primary hypercoagulable state (H24) [D68.59]  History of stroke [Z86.73]             Comments:               Anticoagulation Care Providers       Provider Role Specialty Phone number    Karina Shah DO Referring Internal Medicine [phone]

## 2023-11-16 ENCOUNTER — LAB (OUTPATIENT)
Dept: LAB | Facility: CLINIC | Age: 48
End: 2023-11-16
Payer: COMMERCIAL

## 2023-11-16 ENCOUNTER — ANTICOAGULATION THERAPY VISIT (OUTPATIENT)
Dept: ANTICOAGULATION | Facility: CLINIC | Age: 48
End: 2023-11-16

## 2023-11-16 DIAGNOSIS — D68.59 PRIMARY HYPERCOAGULABLE STATE (H): ICD-10-CM

## 2023-11-16 DIAGNOSIS — Z86.73 HISTORY OF STROKE: ICD-10-CM

## 2023-11-16 DIAGNOSIS — Z79.01 LONG TERM CURRENT USE OF ANTICOAGULANT: ICD-10-CM

## 2023-11-16 DIAGNOSIS — Z79.01 LONG TERM CURRENT USE OF ANTICOAGULANT: Primary | ICD-10-CM

## 2023-11-16 LAB — INR BLD: 2.2 (ref 0.9–1.1)

## 2023-11-16 PROCEDURE — 36416 COLLJ CAPILLARY BLOOD SPEC: CPT

## 2023-11-16 PROCEDURE — 85610 PROTHROMBIN TIME: CPT

## 2023-11-16 NOTE — PROGRESS NOTES
ANTICOAGULATION MANAGEMENT     Belkis Christopher 47 year old female is on warfarin with therapeutic INR result. (Goal INR 2.0-3.0)    Recent labs: (last 7 days)     11/16/23  1529   INR 2.2*       ASSESSMENT     Source(s): Chart Review  Previous INR was Therapeutic last 2(+) visits  Medication, diet, health changes since last INR chart reviewed; none identified         PLAN     Recommended plan for no diet, medication or health factor changes affecting INR     Dosing Instructions: Continue your current warfarin dose with next INR in 6 weeks       Summary  As of 11/16/2023      Full warfarin instructions:  2.5 mg every Mon, Wed, Fri; 5 mg all other days   Next INR check:  12/28/2023               Detailed voice message left for Belkis with dosing instructions and follow up date.     Contact [phone]  to schedule and with any changes, questions or concerns.     Education provided:   Please call back if any changes to your diet, medications or how you've been taking warfarin  Goal range and lab monitoring: goal range and significance of current result    Plan made per [facility] anticoagulation protocol    Norma White RN  Anticoagulation Clinic  11/16/2023    _______________________________________________________________________     Anticoagulation Episode Summary       Current INR goal:  2.0-3.0   TTR:  63.1% (1 y)   Target end date:  Indefinite   Send INR [facility]ers to:  [facility]    Indications    Long term current use of anticoagulant [Z79.01]  Cerebral artery occlusion with cerebral infarction (H) (Resolved) [I63.50]  Primary hypercoagulable state (H24) [D68.59]  History of stroke [Z86.73]             Comments:               Anticoagulation Care Providers       Provider Role Specialty Phone number    Karina Shah DO Referring Internal Medicine [phone]

## 2023-12-10 ENCOUNTER — HEALTH MAINTENANCE LETTER (OUTPATIENT)
Age: 48
End: 2023-12-10

## 2023-12-14 ENCOUNTER — ANTICOAGULATION THERAPY VISIT (OUTPATIENT)
Dept: ANTICOAGULATION | Facility: CLINIC | Age: 48
End: 2023-12-14

## 2023-12-14 ENCOUNTER — OFFICE VISIT (OUTPATIENT)
Dept: FAMILY MEDICINE | Facility: CLINIC | Age: 48
End: 2023-12-14
Payer: COMMERCIAL

## 2023-12-14 VITALS
BODY MASS INDEX: 34.07 KG/M2 | DIASTOLIC BLOOD PRESSURE: 76 MMHG | OXYGEN SATURATION: 97 % | HEART RATE: 73 BPM | RESPIRATION RATE: 16 BRPM | WEIGHT: 212 LBS | TEMPERATURE: 97.3 F | SYSTOLIC BLOOD PRESSURE: 124 MMHG | HEIGHT: 66 IN

## 2023-12-14 DIAGNOSIS — Z86.73 HISTORY OF STROKE: ICD-10-CM

## 2023-12-14 DIAGNOSIS — Z79.01 LONG TERM CURRENT USE OF ANTICOAGULANT: Primary | ICD-10-CM

## 2023-12-14 DIAGNOSIS — Z00.00 ROUTINE GENERAL MEDICAL EXAMINATION AT A HEALTH CARE FACILITY: Primary | ICD-10-CM

## 2023-12-14 DIAGNOSIS — D68.59 PRIMARY HYPERCOAGULABLE STATE (H): ICD-10-CM

## 2023-12-14 DIAGNOSIS — Z79.01 LONG TERM CURRENT USE OF ANTICOAGULANT: ICD-10-CM

## 2023-12-14 DIAGNOSIS — E55.9 VITAMIN D DEFICIENCY: ICD-10-CM

## 2023-12-14 DIAGNOSIS — Z12.11 SCREEN FOR COLON CANCER: ICD-10-CM

## 2023-12-14 DIAGNOSIS — E78.5 HYPERLIPIDEMIA LDL GOAL <70: ICD-10-CM

## 2023-12-14 DIAGNOSIS — Z12.4 CERVICAL CANCER SCREENING: ICD-10-CM

## 2023-12-14 DIAGNOSIS — E78.5 HYPERLIPIDEMIA LDL GOAL <70: Primary | ICD-10-CM

## 2023-12-14 LAB
ANION GAP SERPL CALCULATED.3IONS-SCNC: 12 MMOL/L (ref 7–15)
BUN SERPL-MCNC: 19.6 MG/DL (ref 6–20)
CALCIUM SERPL-MCNC: 9 MG/DL (ref 8.6–10)
CHLORIDE SERPL-SCNC: 102 MMOL/L (ref 98–107)
CHOLEST SERPL-MCNC: 211 MG/DL
CREAT SERPL-MCNC: 0.73 MG/DL (ref 0.51–0.95)
DEPRECATED HCO3 PLAS-SCNC: 25 MMOL/L (ref 22–29)
EGFRCR SERPLBLD CKD-EPI 2021: >90 ML/MIN/1.73M2
ERYTHROCYTE [DISTWIDTH] IN BLOOD BY AUTOMATED COUNT: 14.8 % (ref 10–15)
FASTING STATUS PATIENT QL REPORTED: YES
GLUCOSE SERPL-MCNC: 95 MG/DL (ref 70–99)
HCT VFR BLD AUTO: 40.9 % (ref 35–47)
HDLC SERPL-MCNC: 54 MG/DL
HGB BLD-MCNC: 13.1 G/DL (ref 11.7–15.7)
INR BLD: 1.4 (ref 0.9–1.1)
LDLC SERPL CALC-MCNC: 140 MG/DL
MCH RBC QN AUTO: 28.3 PG (ref 26.5–33)
MCHC RBC AUTO-ENTMCNC: 32 G/DL (ref 31.5–36.5)
MCV RBC AUTO: 88 FL (ref 78–100)
NONHDLC SERPL-MCNC: 157 MG/DL
PLATELET # BLD AUTO: 296 10E3/UL (ref 150–450)
POTASSIUM SERPL-SCNC: 3.9 MMOL/L (ref 3.4–5.3)
RBC # BLD AUTO: 4.63 10E6/UL (ref 3.8–5.2)
SODIUM SERPL-SCNC: 139 MMOL/L (ref 135–145)
TRIGL SERPL-MCNC: 86 MG/DL
VIT D+METAB SERPL-MCNC: 45 NG/ML (ref 20–50)
WBC # BLD AUTO: 6.1 10E3/UL (ref 4–11)

## 2023-12-14 PROCEDURE — G0145 SCR C/V CYTO,THINLAYER,RESCR: HCPCS | Performed by: INTERNAL MEDICINE

## 2023-12-14 PROCEDURE — 80048 BASIC METABOLIC PNL TOTAL CA: CPT | Performed by: INTERNAL MEDICINE

## 2023-12-14 PROCEDURE — 80061 LIPID PANEL: CPT | Performed by: INTERNAL MEDICINE

## 2023-12-14 PROCEDURE — 85610 PROTHROMBIN TIME: CPT | Performed by: INTERNAL MEDICINE

## 2023-12-14 PROCEDURE — 36415 COLL VENOUS BLD VENIPUNCTURE: CPT | Performed by: INTERNAL MEDICINE

## 2023-12-14 PROCEDURE — 82306 VITAMIN D 25 HYDROXY: CPT | Performed by: INTERNAL MEDICINE

## 2023-12-14 PROCEDURE — G0124 SCREEN C/V THIN LAYER BY MD: HCPCS | Performed by: PATHOLOGY

## 2023-12-14 PROCEDURE — 87624 HPV HI-RISK TYP POOLED RSLT: CPT | Performed by: INTERNAL MEDICINE

## 2023-12-14 PROCEDURE — 99396 PREV VISIT EST AGE 40-64: CPT | Performed by: INTERNAL MEDICINE

## 2023-12-14 PROCEDURE — 99214 OFFICE O/P EST MOD 30 MIN: CPT | Mod: 25 | Performed by: INTERNAL MEDICINE

## 2023-12-14 PROCEDURE — 85027 COMPLETE CBC AUTOMATED: CPT | Performed by: INTERNAL MEDICINE

## 2023-12-14 RX ORDER — ATORVASTATIN CALCIUM 80 MG/1
80 TABLET, FILM COATED ORAL DAILY
Qty: 90 TABLET | Refills: 3 | Status: SHIPPED | OUTPATIENT
Start: 2023-12-14 | End: 2023-12-14 | Stop reason: ALTCHOICE

## 2023-12-14 RX ORDER — ROSUVASTATIN CALCIUM 40 MG/1
40 TABLET, COATED ORAL DAILY
Qty: 90 TABLET | Refills: 3 | Status: SHIPPED | OUTPATIENT
Start: 2023-12-14

## 2023-12-14 ASSESSMENT — PAIN SCALES - GENERAL: PAINLEVEL: NO PAIN (0)

## 2023-12-14 NOTE — PROGRESS NOTES
ANTICOAGULATION MANAGEMENT     Belkis Christopher 48 year old female is on warfarin with subtherapeutic INR result. (Goal INR 2.0-3.0)    Recent labs: (last 7 days)     12/14/23  0733   INR 1.4*       ASSESSMENT     Source(s): Chart Review and Patient/Caregiver Call     Warfarin doses taken: Missed dose(s) may be affecting INR  Diet: Increased greens/vitamin K in diet; plans to resume previous intake  Medication/supplement changes: None noted  New illness, injury, or hospitalization: No  Signs or symptoms of bleeding or clotting: No  Previous result: Therapeutic last 2(+) visits  Additional findings:  patient states she missed 3-4 doses last week while traveling and also consumed more vit K than usual       PLAN     Recommended plan for temporary change(s) affecting INR     Dosing Instructions: booster dose then continue your current warfarin dose with next INR in 1 week       Summary  As of 12/14/2023      Full warfarin instructions:  12/14: 10 mg; Otherwise 2.5 mg every Mon, Wed, Fri; 5 mg all other days   Next INR check:  12/21/2023               Sent [employer] message with dosing and follow up instructions    Contact [phone]  to schedule and with any changes, questions or concerns.     Education provided:   Please call back if any changes to your diet, medications or how you've been taking warfarin  Dietary considerations: importance of consistent vitamin K intake  Symptom monitoring: monitoring for clotting signs and symptoms, monitoring for stroke signs and symptoms, and when to seek medical attention/emergency care  Contact [phone]  with any changes, questions or concerns.     Plan made per [facility] anticoagulation protocol    Deb Roman RN  Anticoagulation Clinic  12/14/2023    _______________________________________________________________________     Anticoagulation Episode Summary       Current INR goal:  2.0-3.0   TTR:  62.8% (1 y)   Target end date:  Indefinite   Send INR reminders to:  [facility]  [place]    Indications    Long term current use of anticoagulant [Z79.01]  Cerebral artery occlusion with cerebral infarction (H) (Resolved) [I63.50]  Primary hypercoagulable state (H24) [D68.59]  History of stroke [Z86.73]             Comments:               Anticoagulation Care Providers       Provider Role Specialty Phone number    Karina Shah DO Referring Internal Medicine [phone]

## 2023-12-14 NOTE — PROGRESS NOTES
SUBJECTIVE:   Belkis is a 48 year old, presenting for the following:  Recheck Medication (Denied vaccines) and Physical        12/14/2023     6:43 AM   Additional Questions   Roomed by Sarah MOSELEY   Accompanied by self         12/14/2023     6:43 AM   Patient Reported Additional Medications   Patient reports taking the following new medications no new meds       Healthy Habits:     Getting at least 3 servings of Calcium per day:  Yes    Bi-annual eye exam:  NO    Dental care twice a year:  Yes    Sleep apnea or symptoms of sleep apnea:  None    Diet:  Regular (no restrictions)    Frequency of exercise:  6-7 days/week    Duration of exercise:  15-30 minutes    Taking medications regularly:  Yes    Barriers to taking medications:  None    Medication side effects:  None    Additional concerns today:  Yes (refills today)    Health Care Maintenance  --mother with history of colon polyps, no family history of colon cancer    History of stroke  --we discussed if she would be a candidate for DOAC in place of VKA    Hyperlipidemia Follow-Up    Are you regularly taking any medication or supplement to lower your cholesterol?   Yes- atorvastatin  Are you having muscle aches or other side effects that you think could be caused by your cholesterol lowering medication?  No   In Aug lipitor was increased from 40 to 80 mg; she has not been on another statin from what I can see    Today's PHQ-2 Score:       12/14/2023     6:40 AM   PHQ-2 ( 1999 Pfizer)   Q1: Little interest or pleasure in doing things 0   Q2: Feeling down, depressed or hopeless 0   PHQ-2 Score 0   Q1: Little interest or pleasure in doing things Not at all   Q2: Feeling down, depressed or hopeless Not at all   PHQ-2 Score 0         Have you ever done Advance Care Planning? (For example, a Health Directive, POLST, or a discussion with a medical provider or your loved ones about your wishes): No, advance care planning information given to patient to review.  Patient  declined advance care planning discussion at this time.    Social History     Tobacco Use    Smoking status: Never    Smokeless tobacco: Never   Substance Use Topics    Alcohol use: Yes     Comment: occ.             12/14/2023     6:53 AM   Alcohol Use   Prescreen: >3 drinks/day or >7 drinks/week? No     Reviewed orders with patient.  Reviewed health maintenance and updated orders accordingly - Yes  Current Outpatient Medications   Medication Sig Dispense Refill    atorvastatin (LIPITOR) 80 MG tablet Take 1 tablet (80 mg) by mouth daily 90 tablet 3    cholecalciferol (VITAMIN D3) 125 mcg (5000 units) capsule Take 5,000 mcg by mouth daily      coenzyme Q-10 200 MG CAPS Take 200 mg by mouth daily      warfarin ANTICOAGULANT (JANTOVEN ANTICOAGULANT) 5 MG tablet TAKE ONE-HALF TABLET (2.5MG) BY MOUTH ONCE DAILY ON MONDAY, WEDNESDAY AND FRIDAY, AND TAKE ONE TABLET (5MG) ONCE DAILY ON ALL OTHER DAYS OF THE WEEK OR AS DIRECTED BY INR CLINIC. NEEDS TO SEE PCP FOR FURTHER REFILLS 70 tablet 0       Breast Cancer Screening:    FHS-7:       8/26/2022     7:15 AM   Breast CA Risk Assessment (FHS-7)   Did any of your first-degree relatives have breast or ovarian cancer? No   Did any of your relatives have bilateral breast cancer? No   Did any man in your family have breast cancer? No   Did any woman in your family have breast and ovarian cancer? No   Did any woman in your family have breast cancer before age 50 y? No   Do you have 2 or more relatives with breast and/or ovarian cancer? No   Do you have 2 or more relatives with breast and/or bowel cancer? No     click delete button to remove this line now  Mammogram Screening: Recommended annual mammography  Pertinent mammograms are reviewed under the imaging tab.    History of abnormal Pap smear: NO - age 30-65 PAP every 5 years with negative HPV co-testing recommended      Latest Ref Rng & Units 12/13/2016     3:40 PM 12/13/2016    12:00 AM   PAP / HPV   PAP (Historical)   NIL   "  HPV 16 DNA NEG Negative     HPV 18 DNA NEG Negative     Other HR HPV NEG Negative       Reviewed and updated as needed this visit by clinical staff   Tobacco  Allergies  Meds  Problems             Reviewed and updated as needed this visit by Provider     Meds  Problems                Review of Systems  CONSTITUTIONAL: NEGATIVE for fever, chills, change in weight  INTEGUMENTARU/SKIN: NEGATIVE for worrisome rashes, moles or lesions  EYES: NEGATIVE for vision changes or irritation  ENT: NEGATIVE for ear, mouth and throat problems  RESP: NEGATIVE for significant cough or SOB  BREAST: NEGATIVE for masses, tenderness or discharge  CV: NEGATIVE for chest pain, palpitations or peripheral edema  GI: NEGATIVE for nausea, abdominal pain, heartburn, or change in bowel habits  : NEGATIVE for unusual urinary or vaginal symptoms. Periods are regular.  MUSCULOSKELETAL: NEGATIVE for significant arthralgias or myalgia  NEURO: NEGATIVE for weakness, dizziness or paresthesias  PSYCHIATRIC: NEGATIVE for changes in mood or affect     OBJECTIVE:   /76   Pulse 73   Temp 97.3  F (36.3  C) (Tympanic)   Resp 16   Ht 1.676 m (5' 6\")   Wt 96.2 kg (212 lb)   SpO2 97%   BMI 34.22 kg/m    Physical Exam  GENERAL: healthy, alert and no distress  EYES: Eyes grossly normal to inspection, PERRL and conjunctivae and sclerae normal  HENT: ear canals and TM's normal, nose and mouth without ulcers or lesions  NECK: no adenopathy, no asymmetry, masses, or scars and thyroid normal to palpation  RESP: lungs clear to auscultation - no rales, rhonchi or wheezes  CV: regular rate and rhythm, normal S1 S2, no S3 or S4, no murmur, click or rub, no peripheral edema and peripheral pulses strong  ABDOMEN: soft, nontender, no hepatosplenomegaly, no masses and bowel sounds normal   (female): normal female external genitalia, normal urethral meatus, vaginal mucosa pink, moist, well rugated, and normal cervix/adnexa/uterus without masses or " discharge; pap collected  MS: no gross musculoskeletal defects noted, no edema  SKIN: no suspicious lesions or rashes  NEURO: Normal strength and tone, mentation intact and speech normal  PSYCH: mentation appears normal, affect normal/bright        ASSESSMENT/PLAN:       ICD-10-CM    1. Routine general medical examination at a health care facility  Z00.00       2. Long term current use of anticoagulant  Z79.01 Basic metabolic panel  (Ca, Cl, CO2, Creat, Gluc, K, Na, BUN)     CBC with platelets     OFFICE/OUTPT VISIT,EST,LEVL IV      3. Hyperlipidemia LDL goal <70  E78.5 OFFICE/OUTPT VISIT,EST,LEVL IV      4. Vitamin D deficiency  E55.9 Vitamin D Deficiency     OFFICE/OUTPT VISIT,EST,LEVL IV      5. History of stroke  Z86.73 atorvastatin (LIPITOR) 80 MG tablet     Lipid panel reflex to direct LDL Fasting     OFFICE/OUTPT VISIT,EST,LEVL IV      6. Screen for colon cancer  Z12.11 Colonoscopy Screening  Referral     OFFICE/OUTPT VISIT,EST,LEVL IV      7. Cervical cancer screening  Z12.4 Pap Screen with HPV - recommended age 30 - 65 years     OFFICE/OUTPT VISIT,EST,LEVL IV        Health Care Maintenance  You are due for a mammogram.  Please call [phone] to schedule.  Pap done today  3. Recommend COVID, Tetanus, flu vaccine  4. You are due for a colonoscopy.  Please call [phone] to schedule    History of stroke  Hyperlipidemia  Blood work today  Refills sent  If cholesterol remains elevated, would recommend to change Atorvastatin (Lipitor) to Rosuvastatin (Crestor)  If you decide you want to meet with a hematologist, I can refer you to Dr. Murrell.  Other options would be Xarelto or Eliquis      Patient has been advised of split billing requirements and indicates understanding: Yes      COUNSELING:  Reviewed preventive health counseling, as reflected in patient instructions        She reports that she has never smoked. She has never used smokeless tobacco.          Karina Shah DO   HEALTH  [facility]

## 2023-12-14 NOTE — PATIENT INSTRUCTIONS
Health Care Maintenance  You are due for a mammogram.  Please call [phone] to schedule.  Pap done today  3. Recommend COVID, Tetanus, flu vaccine  4. You are due for a colonoscopy.  Please call [phone] to schedule    History of stroke  Hyperlipidemia  Blood work today  Refills sent  If cholesterol remains elevated, would recommend to change Atorvastatin (Lipitor) to Rosuvastatin (Crestor)  If you decide you want to meet with a hematologist, I can refer you to Dr. Murrell.  Other options would be Xarelto or Eliquis        Tips for a Healthy Diet    Add more fresh fruits and vegetables to your diet.  The more colorful with the fruit or vegetable (think berries, spinach, carrots, peppers) the healthier it tends to be.  Juice is not a fruit.  Prepare the vegetables in a healthy way - steam, bake.  Avoid breading, butter/oil to cook.  Use herbs and spices instead of salt to season food.  Add more fiber to your diet.  Swap out white bread, white rice, white pasta for whole grain versions.  Reduce the portion size and frequency of carbohydrates/starches.   Choose healthier fats such as nuts, olive oil, avocado, etc. Stay away from lard, butter.  Decrease the frequency and portion size of 'junk food' -pizza, candy, cookies, potato chips, etc.  Watch liquid calories such as coffee drinks, juice, soda, teas.  There tends to be excessive sugar in these beverages.  Increase protein in your diet.  Eggs, cheese, Greek yogurt, lentils, chicken, fish, seafood, are good healthy choices.  Protein keeps you chavez longer, and you are less likely to have blood sugar spikes  Eat healthy at least 80% of the time.  It is ok for a special treat every once in a while, just not every day.  When are you going to indulge (think [place] [employer] time), be sure you are eating extra healthy the day before and after.      Resources:  [place] Resources for Health and Wellness:https://www.[facility].edu/dig-yes-foods    2. Book -  Atomic Habits by Bryant Manning.  This a good book that looks into our habits and how to sustain good healthy habits and get rid of bad habits.        Exercise Resources:    For Seniors or Those with Pain/Mobility Issues:  Silver Rasheed  https://tools.Schedulicity/  [employer]itness, [employer] Fitness, [employer], [facility]  2. [employer] Stretch (airs on PBS)  https://FilesX/  3. Your [employer] - small group classes (in person or online) that help you stay active. Free or low cost  ServiceMax.Lendio  4. Yoga for Seniors - free, online  https://www.SourceTour/content/yoga-seniors  5. Sit and Be Fit https://www.sitandbefit.org/    Other Exercise Apps/Websites  [employer]  [employer]  Fitness  - free, You Tube channel        Preventive Health Recommendations  Female Ages 40 to 49    Yearly exam:   See your health care provider every year in order to  Review health changes.   Discuss preventive care.    Review your medicines if your doctor prescribed any.    Get a Pap test every three years (unless you have an abnormal result and your provider advises testing more often).    If you get Pap tests with HPV test, you only need to test every 5 years, unless you have an abnormal result. You do not need a Pap test if your uterus was removed (hysterectomy) and you have not had cancer.    You should be tested each year for STDs (sexually transmitted diseases), if you're at risk.   Ask your doctor if you should have a mammogram.    Have a colonoscopy (test for colon cancer) beginning at age 45.  Ask your provider about other options like a yearly FIT test or Cologuard test every 3 years (stool tests)      Have a cholesterol test every 5 years.     Have a diabetes test (fasting glucose) after age 45. If you are at risk for diabetes, you should have this test every 3 years.    Shots: Get a flu shot each year. Get a tetanus shot every 10 years.     Nutrition:   Eat at  least 5 servings of fruits and vegetables each day.  Eat whole-grain bread, whole-wheat pasta and brown rice instead of white grains and rice.  Get adequate Calcium and Vitamin D.      Lifestyle  Exercise at least 150 minutes a week (an average of 30 minutes a day, 5 days a week). This will help you control your weight and prevent disease.  Limit alcohol to one drink per day.  No smoking.   Wear sunscreen to prevent skin cancer.  See your dentist every six months for an exam and cleaning.

## 2023-12-14 NOTE — LETTER
December 20, 2023      Belkis Christopher  [address] [address]        Dear ,    We are writing to inform you of your test results.    Vitamin D is in the normal range, okay to continue with current vitamin D dose     Resulted Orders   Basic metabolic panel  (Ca, Cl, CO2, Creat, Gluc, K, Na, BUN)   Result Value Ref Range    Sodium 139 135 - 145 mmol/L      Comment:      Reference intervals for this test were updated on 09/26/2023 to more accurately reflect our healthy population. There may be differences in the flagging of prior results with similar values performed with this method. Interpretation of those prior results can be made in the context of the updated reference intervals.     Potassium 3.9 3.4 - 5.3 mmol/L    Chloride 102 98 - 107 mmol/L    Carbon Dioxide (CO2) 25 22 - 29 mmol/L    Anion Gap 12 7 - 15 mmol/L    Urea Nitrogen 19.6 6.0 - 20.0 mg/dL    Creatinine 0.73 0.51 - 0.95 mg/dL    GFR Estimate >90 >60 mL/min/1.73m2    Calcium 9.0 8.6 - 10.0 mg/dL    Glucose 95 70 - 99 mg/dL   CBC with platelets   Result Value Ref Range    WBC Count 6.1 4.0 - 11.0 10e3/uL    RBC Count 4.63 3.80 - 5.20 10e6/uL    Hemoglobin 13.1 11.7 - 15.7 g/dL    Hematocrit 40.9 35.0 - 47.0 %    MCV 88 78 - 100 fL    MCH 28.3 26.5 - 33.0 pg    MCHC 32.0 31.5 - 36.5 g/dL    RDW 14.8 10.0 - 15.0 %    Platelet Count 296 150 - 450 10e3/uL   Lipid panel reflex to direct LDL Fasting   Result Value Ref Range    Cholesterol 211 (H) <200 mg/dL    Triglycerides 86 <150 mg/dL    Direct Measure HDL 54 >=50 mg/dL    LDL Cholesterol Calculated 140 (H) <=100 mg/dL    Non HDL Cholesterol 157 (H) <130 mg/dL    Patient Fasting > 8hrs? Yes     Narrative    Cholesterol  Desirable:  <200 mg/dL    Triglycerides  Normal:  Less than 150 mg/dL  Borderline High:  150-199 mg/dL  High:  200-499 mg/dL  Very High:  Greater than or equal to 500 mg/dL    Direct Measure HDL  Female:  Greater than or equal to 50 mg/dL   Male:  Greater than or  equal to 40 mg/dL    LDL Cholesterol  Desirable:  <100mg/dL  Above Desirable:  100-129 mg/dL   Borderline High:  130-159 mg/dL   High:  160-189 mg/dL   Very High:  >= 190 mg/dL    Non HDL Cholesterol  Desirable:  130 mg/dL  Above Desirable:  130-159 mg/dL  Borderline High:  160-189 mg/dL  High:  190-219 mg/dL  Very High:  Greater than or equal to 220 mg/dL   Vitamin D Deficiency   Result Value Ref Range    Vitamin D, Total (25-Hydroxy) 45 20 - 50 ng/mL      Comment:      optimum levels    Narrative    Season, race, dietary intake, and treatment affect the concentration of 25-hydroxy-Vitamin D. Values may decrease during winter months and increase during summer months.    Vitamin D determination is routinely performed by an immunoassay specific for 25 hydroxyvitamin D3.  If an individual is on vitamin D2(ergocalciferol) supplementation, please specify 25 OH vitamin D2 and D3 level determination by LCMSMS test VITD23.         If you have any questions or concerns, please call the clinic at the number listed above.       Sincerely,      Karina Shah, DO

## 2023-12-14 NOTE — RESULT ENCOUNTER NOTE
The LDL or bad cholesterol is very elevated, goal is less than 70.  Recommend to stop atorvastatin (Lipitor) and start rosuvastatin (Crestor) 40 mg once daily.  Recommend recheck fasting cholesterol in about 2 months.    Kidney function, blood sugar, electrolytes, blood counts are normal.

## 2023-12-19 LAB
BKR LAB AP GYN ADEQUACY: ABNORMAL
BKR LAB AP GYN INTERPRETATION: ABNORMAL
BKR LAB AP HPV REFLEX: ABNORMAL
BKR LAB AP PREVIOUS ABNORMAL: ABNORMAL
PATH REPORT.COMMENTS IMP SPEC: ABNORMAL
PATH REPORT.COMMENTS IMP SPEC: ABNORMAL
PATH REPORT.RELEVANT HX SPEC: ABNORMAL

## 2023-12-20 PROBLEM — R87.610 ASCUS OF CERVIX WITH NEGATIVE HIGH RISK HPV: Status: ACTIVE | Noted: 2023-12-14

## 2023-12-20 LAB
HUMAN PAPILLOMA VIRUS 16 DNA: NEGATIVE
HUMAN PAPILLOMA VIRUS 18 DNA: NEGATIVE
HUMAN PAPILLOMA VIRUS FINAL DIAGNOSIS: NORMAL
HUMAN PAPILLOMA VIRUS OTHER HR: NEGATIVE

## 2023-12-21 ENCOUNTER — LAB (OUTPATIENT)
Dept: LAB | Facility: CLINIC | Age: 48
End: 2023-12-21
Payer: COMMERCIAL

## 2023-12-21 ENCOUNTER — ANTICOAGULATION THERAPY VISIT (OUTPATIENT)
Dept: ANTICOAGULATION | Facility: CLINIC | Age: 48
End: 2023-12-21

## 2023-12-21 DIAGNOSIS — Z79.01 LONG TERM CURRENT USE OF ANTICOAGULANT: ICD-10-CM

## 2023-12-21 DIAGNOSIS — D68.59 PRIMARY HYPERCOAGULABLE STATE (H): ICD-10-CM

## 2023-12-21 DIAGNOSIS — Z79.01 LONG TERM CURRENT USE OF ANTICOAGULANT: Primary | ICD-10-CM

## 2023-12-21 DIAGNOSIS — Z86.73 HISTORY OF STROKE: ICD-10-CM

## 2023-12-21 LAB — INR BLD: 2.3 (ref 0.9–1.1)

## 2023-12-21 PROCEDURE — 36416 COLLJ CAPILLARY BLOOD SPEC: CPT

## 2023-12-21 PROCEDURE — 85610 PROTHROMBIN TIME: CPT

## 2023-12-21 NOTE — PROGRESS NOTES
ANTICOAGULATION MANAGEMENT     Belkis Christopher 48 year old female is on warfarin with therapeutic INR result. (Goal INR 2.0-3.0)    Recent labs: (last 7 days)     12/21/23  1510   INR 2.3*       ASSESSMENT     Source(s): Chart Review and Patient/Caregiver Call     Warfarin doses taken: Warfarin taken as instructed  Diet: No new diet changes identified  Medication/supplement changes: None noted  New illness, injury, or hospitalization: No  Signs or symptoms of bleeding or clotting: No  Previous result: Subtherapeutic  Additional findings: None       PLAN     Recommended plan for no diet, medication or health factor changes affecting INR     Dosing Instructions: Continue your current warfarin dose with next INR in 2 weeks       Summary  As of 12/21/2023      Full warfarin instructions:  2.5 mg every Mon, Wed, Fri; 5 mg all other days   Next INR check:  1/4/2024               Telephone call with Belkis who verbalizes understanding and agrees to plan    Lab visit scheduled    Education provided:   Goal range and lab monitoring: goal range and significance of current result    Plan made per [facility] anticoagulation protocol    Norma White RN  Anticoagulation Clinic  12/21/2023    _______________________________________________________________________     Anticoagulation Episode Summary       Current INR goal:  2.0-3.0   TTR:  63.4% (1 y)   Target end date:  Indefinite   Send INR reminders to:  [facility]    Indications    Long term current use of anticoagulant [Z79.01]  Cerebral artery occlusion with cerebral infarction (H) (Resolved) [I63.50]  Primary hypercoagulable state (H24) [D68.59]  History of stroke [Z86.73]             Comments:               Anticoagulation Care Providers       Provider Role Specialty Phone number    Karina Shah DO Referring Internal Medicine [phone]

## 2024-01-05 ENCOUNTER — ANTICOAGULATION THERAPY VISIT (OUTPATIENT)
Dept: ANTICOAGULATION | Facility: CLINIC | Age: 49
End: 2024-01-05

## 2024-01-05 ENCOUNTER — LAB (OUTPATIENT)
Dept: LAB | Facility: CLINIC | Age: 49
End: 2024-01-05
Payer: COMMERCIAL

## 2024-01-05 DIAGNOSIS — D68.59 PRIMARY HYPERCOAGULABLE STATE (H): ICD-10-CM

## 2024-01-05 DIAGNOSIS — Z79.01 LONG TERM CURRENT USE OF ANTICOAGULANT: Primary | ICD-10-CM

## 2024-01-05 DIAGNOSIS — Z86.73 HISTORY OF STROKE: ICD-10-CM

## 2024-01-05 DIAGNOSIS — Z79.01 LONG TERM CURRENT USE OF ANTICOAGULANT: ICD-10-CM

## 2024-01-05 LAB — INR BLD: 2.7 (ref 0.9–1.1)

## 2024-01-05 PROCEDURE — 85610 PROTHROMBIN TIME: CPT

## 2024-01-05 PROCEDURE — 36416 COLLJ CAPILLARY BLOOD SPEC: CPT

## 2024-01-05 NOTE — PROGRESS NOTES
ANTICOAGULATION MANAGEMENT     Belkis Christopher 48 year old female is on warfarin with therapeutic INR result. (Goal INR 2.0-3.0)    Recent labs: (last 7 days)     01/05/24  1557   INR 2.7*       ASSESSMENT     Source(s): Chart Review  Previous INR was Therapeutic last visit; previously outside of goal range  Medication, diet, health changes since last INR chart reviewed; none identified         PLAN     Recommended plan for no diet, medication or health factor changes affecting INR     Dosing Instructions: Continue your current warfarin dose with next INR in 3 weeks       Summary  As of 1/5/2024      Full warfarin instructions:  2.5 mg every Mon, Wed, Fri; 5 mg all other days   Next INR check:  1/26/2024               Detailed voice message left for Belkis with dosing instructions and follow up date.   Sent [email or web address] message with dosing and follow up instructions    Contact [phone]  to schedule and with any changes, questions or concerns.     Education provided:   Please call back if any changes to your diet, medications or how you've been taking warfarin  Goal range and lab monitoring: goal range and significance of current result and Importance of following up at instructed interval    Plan made per [facility] anticoagulation protocol    Laurie Bell RN  Anticoagulation Clinic  1/5/2024    _______________________________________________________________________     Anticoagulation Episode Summary       Current INR goal:  2.0-3.0   TTR:  64.4% (1 y)   Target end date:  Indefinite   [facility]    Indications    Long term current use of anticoagulant [Z79.01]  Cerebral artery occlusion with cerebral infarction (H) (Resolved) [I63.50]  Primary hypercoagulable state (H24) [D68.59]  History of stroke [Z86.73]             Comments:               Anticoagulation Care Providers       Provider Role Specialty Phone number    Karina Shah DO Referring Internal Medicine [phone]

## 2024-01-09 ENCOUNTER — ANCILLARY PROCEDURE (OUTPATIENT)
Dept: MAMMOGRAPHY | Facility: CLINIC | Age: 49
End: 2024-01-09
Attending: INTERNAL MEDICINE
Payer: COMMERCIAL

## 2024-01-09 DIAGNOSIS — Z12.31 VISIT FOR SCREENING MAMMOGRAM: ICD-10-CM

## 2024-01-09 PROCEDURE — 77067 SCR MAMMO BI INCL CAD: CPT | Mod: TC | Performed by: RADIOLOGY

## 2024-01-09 PROCEDURE — 77063 BREAST TOMOSYNTHESIS BI: CPT | Mod: TC | Performed by: RADIOLOGY

## 2024-02-02 ENCOUNTER — LAB (OUTPATIENT)
Dept: LAB | Facility: CLINIC | Age: 49
End: 2024-02-02
Payer: COMMERCIAL

## 2024-02-02 ENCOUNTER — ANTICOAGULATION THERAPY VISIT (OUTPATIENT)
Dept: ANTICOAGULATION | Facility: CLINIC | Age: 49
End: 2024-02-02

## 2024-02-02 DIAGNOSIS — Z86.73 HISTORY OF STROKE: ICD-10-CM

## 2024-02-02 DIAGNOSIS — D68.59 PRIMARY HYPERCOAGULABLE STATE (H): ICD-10-CM

## 2024-02-02 DIAGNOSIS — Z79.01 LONG TERM CURRENT USE OF ANTICOAGULANT: Primary | ICD-10-CM

## 2024-02-02 DIAGNOSIS — Z79.01 LONG TERM CURRENT USE OF ANTICOAGULANT: ICD-10-CM

## 2024-02-02 LAB — INR BLD: 3.5 (ref 0.9–1.1)

## 2024-02-02 PROCEDURE — 36416 COLLJ CAPILLARY BLOOD SPEC: CPT

## 2024-02-02 PROCEDURE — 85610 PROTHROMBIN TIME: CPT

## 2024-02-02 NOTE — PROGRESS NOTES
ANTICOAGULATION MANAGEMENT     Belkis Christopher 48 year old female is on warfarin with supratherapeutic INR result. (Goal INR 2.0-3.0)    Recent labs: (last 7 days)     02/02/24  1533   INR 3.5*       ASSESSMENT     Source(s): Chart Review  Previous INR was Therapeutic last 2(+) visits  Medication, diet, health changes since last INR chart reviewed; none identified         PLAN     Unable to reach patient today.    Left message for patient to call back. FoneSenset message sent per patient preferences with weekend dosing.     Follow up required to confirm warfarin dose taken and assess for changes    Deb Pérez, RN  Anticoagulation Clinic  2/2/2024

## 2024-02-05 NOTE — PROGRESS NOTES
ANTICOAGULATION MANAGEMENT     Belkis Christophre 48 year old female is on warfarin with supratherapeutic INR result. (Goal INR 2.0-3.0)    Recent labs: (last 7 days)     02/02/24  1533   INR 3.5*       ASSESSMENT     Source(s): Chart Review and Patient/Caregiver Call     Warfarin doses taken: Warfarin taken as instructed  Diet: No new diet changes identified  Medication/supplement changes:  Rosuvastatin 40 mg started on 12/14/23 which may be increasing INR today  New illness, injury, or hospitalization: No  Signs or symptoms of bleeding or clotting: No  Previous result: Therapeutic last 2(+) visits  Additional findings: None       PLAN     Recommended plan for ongoing change(s) affecting INR     Dosing Instructions: hold dose then decrease your warfarin dose (9.1% change) with next INR in 2 weeks       Summary  As of 2/2/2024      Full warfarin instructions:  2/2: Hold; Otherwise 5 mg every Sun, Tue, Thu; 2.5 mg all other days   Next INR check:  2/16/2024               Telephone call with Belkis who agrees to plan and repeated back plan correctly    Lab visit scheduled    Education provided:   Contact [phone]  with any changes, questions or concerns.     Plan made per [facility] anticoagulation protocol    Deb Pérez RN  Anticoagulation Clinic  2/5/2024    _______________________________________________________________________     Anticoagulation Episode Summary       Current INR goal:  2.0-3.0   TTR:  62.1% (1 y)   Target end date:  Indefinite   [facility]    Indications    Long term current use of anticoagulant [Z79.01]  Cerebral artery occlusion with cerebral infarction (H) (Resolved) [I63.50]  Primary hypercoagulable state (H24) [D68.59]  History of stroke [Z86.73]             Comments:               Anticoagulation Care Providers       Provider Role Specialty Phone number    Karina Shah DO Referring Internal Medicine [phone]

## 2024-02-16 ENCOUNTER — LAB (OUTPATIENT)
Dept: LAB | Facility: CLINIC | Age: 49
End: 2024-02-16
Payer: COMMERCIAL

## 2024-02-16 ENCOUNTER — ANTICOAGULATION THERAPY VISIT (OUTPATIENT)
Dept: ANTICOAGULATION | Facility: CLINIC | Age: 49
End: 2024-02-16

## 2024-02-16 DIAGNOSIS — Z86.73 HISTORY OF STROKE: ICD-10-CM

## 2024-02-16 DIAGNOSIS — D68.59 PRIMARY HYPERCOAGULABLE STATE (H): ICD-10-CM

## 2024-02-16 DIAGNOSIS — Z79.01 LONG TERM CURRENT USE OF ANTICOAGULANT: ICD-10-CM

## 2024-02-16 DIAGNOSIS — Z79.01 LONG TERM CURRENT USE OF ANTICOAGULANT: Primary | ICD-10-CM

## 2024-02-16 LAB — INR BLD: 2.7 (ref 0.9–1.1)

## 2024-02-16 PROCEDURE — 85610 PROTHROMBIN TIME: CPT

## 2024-02-16 PROCEDURE — 36416 COLLJ CAPILLARY BLOOD SPEC: CPT

## 2024-02-16 NOTE — PROGRESS NOTES
ANTICOAGULATION MANAGEMENT     Belkis Christopher 48 year old female is on warfarin with therapeutic INR result. (Goal INR 2.0-3.0)    Recent labs: (last 7 days)     02/16/24  1527   INR 2.7*       ASSESSMENT     Source(s): Chart Review  Previous INR was Supratherapeutic  Medication, diet, health changes since last INR chart reviewed; none identified         PLAN     Recommended plan for no diet, medication or health factor changes affecting INR     Dosing Instructions: Continue your current warfarin dose with next INR in 3 weeks       Summary  As of 2/16/2024      Full warfarin instructions:  5 mg every Sun, Tue, Thu; 2.5 mg all other days   Next INR check:  3/8/2024               Detailed voice message left for Belkis with dosing instructions and follow up date.     Contact [phone]  to schedule and with any changes, questions or concerns.     Education provided:   Please call back if any changes to your diet, medications or how you've been taking warfarin  Goal range and lab monitoring: goal range and significance of current result    Plan made per [facility] anticoagulation protocol    Norma White RN  Anticoagulation Clinic  2/16/2024    _______________________________________________________________________     Anticoagulation Episode Summary       Current INR goal:  2.0-3.0   TTR:  63.0% (1 y)   Target end date:  Indefinite   Send INR reminders to:  [facility]    Indications    Long term current use of anticoagulant [Z79.01]  Cerebral artery occlusion with cerebral infarction (H) (Resolved) [I63.50]  Primary hypercoagulable state (H24) [D68.59]  History of stroke [Z86.73]             Comments:               Anticoagulation Care Providers       Provider Role Specialty Phone number    Karina Shah DO Referring Internal Medicine [phone]

## 2024-03-18 DIAGNOSIS — Z79.01 LONG TERM CURRENT USE OF ANTICOAGULANT: ICD-10-CM

## 2024-03-18 RX ORDER — WARFARIN SODIUM 5 MG/1
TABLET ORAL
Qty: 70 TABLET | Refills: 1 | Status: SHIPPED | OUTPATIENT
Start: 2024-03-18 | End: 2024-09-10

## 2024-03-18 NOTE — TELEPHONE ENCOUNTER
ANTICOAGULATION MANAGEMENT:  Medication Refill    Anticoagulation Summary  As of 2/16/2024      Warfarin maintenance plan:  5 mg (5 mg x 1) every Sun, Tue, Thu; 2.5 mg (5 mg x 0.5) all other days   Next INR check:  3/8/2024   Target end date:  Indefinite    Indications    Long term current use of anticoagulant [Z79.01]  Cerebral artery occlusion with cerebral infarction (H) (Resolved) [I63.50]  Primary hypercoagulable state (H24) [D68.59]  History of stroke [Z86.73]                 Anticoagulation Care Providers       Provider Role Specialty Phone number    Karina Shah DO Referring Internal Medicine [phone]            Refill Criteria    Visit with referring provider/group: Meets criteria: office visit within referring provider group in the last 1 year on 12/14/23    [facility] referral last signed: 08/16/2023; within last year: Yes    Lab monitoring not exceeding 2 weeks overdue: No    Belkis meets all criteria for refill. Rx instructions and quantity supplied updated to match patient's current dosing plan. Warfarin 90 day supply with 1 refill granted per [facility] protocol     Vira Gan RN  Anticoagulation Clinic

## 2024-03-19 ENCOUNTER — LAB (OUTPATIENT)
Dept: LAB | Facility: CLINIC | Age: 49
End: 2024-03-19
Payer: COMMERCIAL

## 2024-03-19 ENCOUNTER — ANTICOAGULATION THERAPY VISIT (OUTPATIENT)
Dept: ANTICOAGULATION | Facility: CLINIC | Age: 49
End: 2024-03-19

## 2024-03-19 DIAGNOSIS — D68.59 PRIMARY HYPERCOAGULABLE STATE (H): ICD-10-CM

## 2024-03-19 DIAGNOSIS — Z86.73 HISTORY OF STROKE: ICD-10-CM

## 2024-03-19 DIAGNOSIS — Z79.01 LONG TERM CURRENT USE OF ANTICOAGULANT: Primary | ICD-10-CM

## 2024-03-19 DIAGNOSIS — Z79.01 LONG TERM CURRENT USE OF ANTICOAGULANT: ICD-10-CM

## 2024-03-19 LAB — INR BLD: 3.6 (ref 0.9–1.1)

## 2024-03-19 PROCEDURE — 85610 PROTHROMBIN TIME: CPT

## 2024-03-19 PROCEDURE — 36416 COLLJ CAPILLARY BLOOD SPEC: CPT

## 2024-03-19 NOTE — PROGRESS NOTES
ANTICOAGULATION MANAGEMENT     Belkis Christopher 48 year old female is on warfarin with supratherapeutic INR result. (Goal INR 2.0-3.0)    Recent labs: (last 7 days)     03/19/24  1417   INR 3.6*       ASSESSMENT     Source(s): Chart Review  Previous INR was Therapeutic last visit; previously outside of goal range  Medication, diet, health changes since last INR chart reviewed; none identified         PLAN     Recommended plan for no diet, medication or health factor changes affecting INR     Dosing Instructions: hold dose then continue your current warfarin dose with next INR in 2 weeks       Summary  As of 3/19/2024      Full warfarin instructions:  3/19: Hold; Otherwise 5 mg every Sun, Tue, Thu; 2.5 mg all other days   Next INR check:  4/2/2024               Detailed voice message left for Belkis with dosing instructions and follow up date.     Contact [phone] to schedule and with any changes, questions or concerns.     Education provided:   Please call back if any changes to your diet, medications or how you've been taking warfarin    Plan made per [facility] anticoagulation protocol    Farhan Acharya RN  Anticoagulation Clinic  3/19/2024    _______________________________________________________________________     Anticoagulation Episode Summary       Current INR goal:  2.0-3.0   TTR:  61.2% (1 y)   Target end date:  Indefinite   Send INR reminders to:  [facility]    Indications    Long term current use of anticoagulant [Z79.01]  Cerebral artery occlusion with cerebral infarction (H) (Resolved) [I63.50]  Primary hypercoagulable state (H24) [D68.59]  History of stroke [Z86.73]             Comments:               Anticoagulation Care Providers       Provider Role Specialty Phone number    Karina Shah DO Referring Internal Medicine [phone]

## 2024-04-05 ENCOUNTER — LAB (OUTPATIENT)
Dept: LAB | Facility: CLINIC | Age: 49
End: 2024-04-05
Payer: COMMERCIAL

## 2024-04-05 ENCOUNTER — ANTICOAGULATION THERAPY VISIT (OUTPATIENT)
Dept: ANTICOAGULATION | Facility: CLINIC | Age: 49
End: 2024-04-05

## 2024-04-05 DIAGNOSIS — Z86.73 HISTORY OF STROKE: ICD-10-CM

## 2024-04-05 DIAGNOSIS — D68.59 PRIMARY HYPERCOAGULABLE STATE (H): ICD-10-CM

## 2024-04-05 DIAGNOSIS — Z79.01 LONG TERM CURRENT USE OF ANTICOAGULANT: Primary | ICD-10-CM

## 2024-04-05 DIAGNOSIS — Z79.01 LONG TERM CURRENT USE OF ANTICOAGULANT: ICD-10-CM

## 2024-04-05 LAB — INR BLD: 1.9 (ref 0.9–1.1)

## 2024-04-05 PROCEDURE — 36416 COLLJ CAPILLARY BLOOD SPEC: CPT

## 2024-04-05 PROCEDURE — 85610 PROTHROMBIN TIME: CPT

## 2024-04-05 NOTE — PROGRESS NOTES
ANTICOAGULATION MANAGEMENT     Belkis Christopher 48 year old female is on warfarin with subtherapeutic INR result. (Goal INR 2.0-3.0)    Recent labs: (last 7 days)     04/05/24  0747   INR 1.9*       ASSESSMENT     Source(s): Chart Review and Patient/Caregiver Call     Warfarin doses taken: Warfarin taken as instructed  Diet: No new diet changes identified  Medication/supplement changes: None noted  New illness, injury, or hospitalization: No  Signs or symptoms of bleeding or clotting: No  Previous result: Supratherapeutic  Additional findings: None     PLAN     Recommended plan for no diet, medication or health factor changes affecting INR     Dosing Instructions: Continue your current warfarin dose with next INR in 2 weeks       Patient is unsure why INR was elevated at last visit - she is trying to work on using a pill box more routine.    Summary  As of 4/5/2024      Full warfarin instructions:  5 mg every Sun, Tue, Thu; 2.5 mg all other days   Next INR check:  4/19/2024               Telephone call with Belkis who verbalizes understanding and agrees to plan    Lab visit scheduled    Education provided:   Please call back if any changes to your diet, medications or how you've been taking warfarin  Symptom monitoring: monitoring for clotting signs and symptoms and monitoring for stroke signs and symptoms    Plan made with [facility] Pharmacist Janeen Gan, GEORGE  Anticoagulation Clinic  4/5/2024    _______________________________________________________________________     Anticoagulation Episode Summary       Current INR goal:  2.0-3.0   TTR:  59.5% (1 y)   Target end date:  Indefinite   [facility]    Indications    Long term current use of anticoagulant [Z79.01]  Cerebral artery occlusion with cerebral infarction (H) (Resolved) [I63.50]  Primary hypercoagulable state (H24) [D68.59]  History of stroke [Z86.73]             Comments:               Anticoagulation Care  Providers       Provider Role Specialty Phone number    Karina Shah DO Referring Internal Medicine [phone]

## 2024-04-19 ENCOUNTER — ANTICOAGULATION THERAPY VISIT (OUTPATIENT)
Dept: ANTICOAGULATION | Facility: CLINIC | Age: 49
End: 2024-04-19

## 2024-04-19 ENCOUNTER — LAB (OUTPATIENT)
Dept: LAB | Facility: CLINIC | Age: 49
End: 2024-04-19
Payer: COMMERCIAL

## 2024-04-19 DIAGNOSIS — D68.59 PRIMARY HYPERCOAGULABLE STATE (H): ICD-10-CM

## 2024-04-19 DIAGNOSIS — Z79.01 LONG TERM CURRENT USE OF ANTICOAGULANT: ICD-10-CM

## 2024-04-19 DIAGNOSIS — Z86.73 HISTORY OF STROKE: ICD-10-CM

## 2024-04-19 DIAGNOSIS — Z79.01 LONG TERM CURRENT USE OF ANTICOAGULANT: Primary | ICD-10-CM

## 2024-04-19 LAB — INR BLD: 2.2 (ref 0.9–1.1)

## 2024-04-19 PROCEDURE — 85610 PROTHROMBIN TIME: CPT

## 2024-04-19 PROCEDURE — 36416 COLLJ CAPILLARY BLOOD SPEC: CPT

## 2024-04-19 NOTE — PROGRESS NOTES
ANTICOAGULATION MANAGEMENT     Belkis Christopher 48 year old female is on warfarin with therapeutic INR result. (Goal INR 2.0-3.0)    Recent labs: (last 7 days)     04/19/24  0700   INR 2.2*       ASSESSMENT     Source(s): Chart Review and Patient/Caregiver Call     Warfarin doses taken: Less warfarin taken than planned which may be affecting INR - missed dose on 4/14 though took part of that dose the next day.   Diet: No new diet changes identified  Medication/supplement changes: None noted  New illness, injury, or hospitalization: No  Signs or symptoms of bleeding or clotting: No  Previous result: Subtherapeutic  Additional findings: None       PLAN     Recommended plan for temporary change(s) affecting INR     Dosing Instructions: Continue your current warfarin dose with next INR in 2-3 weeks       Summary  As of 4/19/2024      Full warfarin instructions:  5 mg every Sun, Tue, Thu; 2.5 mg all other days   Next INR check:  5/10/2024               Telephone call with Belkis who verbalizes understanding and agrees to plan    Lab visit scheduled    Education provided:   Please call back if any changes to your diet, medications or how you've been taking warfarin  Taking warfarin: take warfarin at same time each day; preferably in the evening    Plan made per [facility] anticoagulation protocol    Amira Boyer RN  Anticoagulation Clinic  4/19/2024    _______________________________________________________________________     Anticoagulation Episode Summary       Current INR goal:  2.0-3.0   TTR:  62.1% (1 y)   Target end date:  Indefinite   Send INR reminders to:  [facility]    Indications    Long term current use of anticoagulant [Z79.01]  Cerebral artery occlusion with cerebral infarction (H) (Resolved) [I63.50]  Primary hypercoagulable state (H24) [D68.59]  History of stroke [Z86.73]             Comments:               Anticoagulation Care Providers       Provider Role Specialty Phone number    Shah  Karina Markham DO [facility] Internal Medicine [phone]

## 2024-05-10 ENCOUNTER — ANTICOAGULATION THERAPY VISIT (OUTPATIENT)
Dept: ANTICOAGULATION | Facility: CLINIC | Age: 49
End: 2024-05-10

## 2024-05-10 ENCOUNTER — LAB (OUTPATIENT)
Dept: LAB | Facility: CLINIC | Age: 49
End: 2024-05-10
Payer: COMMERCIAL

## 2024-05-10 DIAGNOSIS — Z79.01 LONG TERM CURRENT USE OF ANTICOAGULANT: Primary | ICD-10-CM

## 2024-05-10 DIAGNOSIS — D68.59 PRIMARY HYPERCOAGULABLE STATE (H): ICD-10-CM

## 2024-05-10 DIAGNOSIS — Z79.01 LONG TERM CURRENT USE OF ANTICOAGULANT: ICD-10-CM

## 2024-05-10 DIAGNOSIS — Z86.73 HISTORY OF STROKE: ICD-10-CM

## 2024-05-10 LAB — INR BLD: 2 (ref 0.9–1.1)

## 2024-05-10 PROCEDURE — 85610 PROTHROMBIN TIME: CPT

## 2024-05-10 PROCEDURE — 36416 COLLJ CAPILLARY BLOOD SPEC: CPT

## 2024-05-10 NOTE — PROGRESS NOTES
ANTICOAGULATION MANAGEMENT     Belkis Christopher 48 year old female is on warfarin with therapeutic INR result. (Goal INR 2.0-3.0)    Recent labs: (last 7 days)     05/10/24  0727   INR 2.0*       ASSESSMENT     Source(s): Chart Review and Patient/Caregiver Call     Warfarin doses taken: Missed dose(s) may be affecting INR  Diet: No new diet changes identified  Medication/supplement changes: None noted  New illness, injury, or hospitalization: No  Signs or symptoms of bleeding or clotting: No  Previous result: Therapeutic last visit; previously outside of goal range  Additional findings: patient in range and she misses doses frequently. She is aware to take the missed dose the next day if she can.       PLAN     Recommended plan for temporary change(s) affecting INR     Dosing Instructions: Continue your current warfarin dose with next INR in 4 weeks       Summary  As of 5/10/2024      Full warfarin instructions:  5 mg every Sun, Tue, Thu; 2.5 mg all other days   Next INR check:  6/7/2024               Telephone call with Belkis who verbalizes understanding and agrees to plan    Lab visit scheduled    Education provided:   Please call back if any changes to your diet, medications or how you've been taking warfarin  Taking warfarin: Importance of taking warfarin as instructed  Contact [phone]  with any changes, questions or concerns.     Plan made per [facility] anticoagulation protocol    Deb Roman RN  Anticoagulation Clinic  5/10/2024    _______________________________________________________________________     Anticoagulation Episode Summary       Current INR goal:  2.0-3.0   TTR:  67.8% (1 y)   Target end date:  Indefinite   Send INR reminders to:  [facility]    Indications    Long term current use of anticoagulant [Z79.01]  Cerebral artery occlusion with cerebral infarction (H) (Resolved) [I63.50]  Primary hypercoagulable state (H24) [D68.59]  History of stroke [Z86.73]             Comments:                Anticoagulation Care Providers       Provider Role Specialty Phone number    Karina Shah DO Referring Internal Medicine [phone]

## 2024-06-07 ENCOUNTER — ANTICOAGULATION THERAPY VISIT (OUTPATIENT)
Dept: ANTICOAGULATION | Facility: CLINIC | Age: 49
End: 2024-06-07

## 2024-06-07 ENCOUNTER — LAB (OUTPATIENT)
Dept: LAB | Facility: CLINIC | Age: 49
End: 2024-06-07
Payer: COMMERCIAL

## 2024-06-07 DIAGNOSIS — D68.59 PRIMARY HYPERCOAGULABLE STATE (H): ICD-10-CM

## 2024-06-07 DIAGNOSIS — Z86.73 HISTORY OF STROKE: ICD-10-CM

## 2024-06-07 DIAGNOSIS — Z79.01 LONG TERM CURRENT USE OF ANTICOAGULANT: ICD-10-CM

## 2024-06-07 DIAGNOSIS — Z79.01 LONG TERM CURRENT USE OF ANTICOAGULANT: Primary | ICD-10-CM

## 2024-06-07 LAB — INR BLD: 3.1 (ref 0.9–1.1)

## 2024-06-07 PROCEDURE — 85610 PROTHROMBIN TIME: CPT

## 2024-06-07 PROCEDURE — 36416 COLLJ CAPILLARY BLOOD SPEC: CPT

## 2024-06-07 NOTE — PROGRESS NOTES
ANTICOAGULATION MANAGEMENT     Belkis Christopher 48 year old female is on warfarin with supratherapeutic INR result. (Goal INR 2.0-3.0)    Recent labs: (last 7 days)     06/07/24  0718   INR 3.1*       ASSESSMENT     Source(s): Chart Review and Patient/Caregiver Call     Warfarin doses taken: Warfarin taken as instructed  Diet: Change in alcohol intake may be affecting INR. Had a couple glasses of wine out to dinner this week   Medication/supplement changes: None noted  New illness, injury, or hospitalization: No  Signs or symptoms of bleeding or clotting: No  Previous result: Therapeutic last 2(+) visits but both times, had missed doses  Additional findings: patient may need a dose reduction if she does not miss any doses at next recheck or have any temp factors and INR still supra       PLAN     Recommended plan for temporary change(s) affecting INR     Dosing Instructions: Continue your current warfarin dose with next INR in 2 weeks       Summary  As of 6/7/2024      Full warfarin instructions:  5 mg every Sun, Tue, Thu; 2.5 mg all other days   Next INR check:  6/21/2024               Telephone call with Belkis who verbalizes understanding and agrees to plan    Lab visit scheduled    Education provided:   Please call back if any changes to your diet, medications or how you've been taking warfarin  Contact [phone]  with any changes, questions or concerns.     Plan made per [facility] anticoagulation protocol    Deb Roman RN  Anticoagulation Clinic  6/7/2024    _______________________________________________________________________     Anticoagulation Episode Summary       Current INR goal:  2.0-3.0   TTR:  70.1% (1 y)   Target end date:  Indefinite   Send INR reminders to:  [facility]    Indications    Long term current use of anticoagulant [Z79.01]  Cerebral artery occlusion with cerebral infarction (H) (Resolved) [I63.50]  Primary hypercoagulable state (H24) [D68.59]  History of stroke [Z86.73]              Comments:               Anticoagulation Care Providers       Provider Role Specialty Phone number    Karina Shah,  Referring Internal Medicine [phone]

## 2024-06-21 ENCOUNTER — LAB (OUTPATIENT)
Dept: LAB | Facility: CLINIC | Age: 49
End: 2024-06-21
Payer: COMMERCIAL

## 2024-06-21 ENCOUNTER — ANTICOAGULATION THERAPY VISIT (OUTPATIENT)
Dept: ANTICOAGULATION | Facility: CLINIC | Age: 49
End: 2024-06-21

## 2024-06-21 DIAGNOSIS — Z86.73 HISTORY OF STROKE: ICD-10-CM

## 2024-06-21 DIAGNOSIS — Z79.01 LONG TERM CURRENT USE OF ANTICOAGULANT: Primary | ICD-10-CM

## 2024-06-21 DIAGNOSIS — D68.59 PRIMARY HYPERCOAGULABLE STATE (H): ICD-10-CM

## 2024-06-21 DIAGNOSIS — Z79.01 LONG TERM CURRENT USE OF ANTICOAGULANT: ICD-10-CM

## 2024-06-21 LAB — INR BLD: 2.2 (ref 0.9–1.1)

## 2024-06-21 PROCEDURE — 36416 COLLJ CAPILLARY BLOOD SPEC: CPT

## 2024-06-21 PROCEDURE — 85610 PROTHROMBIN TIME: CPT

## 2024-06-21 NOTE — PROGRESS NOTES
ANTICOAGULATION MANAGEMENT     Belkis Christopher 48 year old female is on warfarin with therapeutic INR result. (Goal INR 2.0-3.0)    Recent labs: (last 7 days)     06/21/24  1528   INR 2.2*       ASSESSMENT     Source(s): Chart Review  Previous INR was Supratherapeutic  Medication, diet, health changes since last INR chart reviewed; none identified         PLAN     Recommended plan for no diet, medication or health factor changes affecting INR     Dosing Instructions: Continue your current warfarin dose with next INR in 3 weeks       Summary  As of 6/21/2024      Full warfarin instructions:  5 mg every Sun, Tue, Thu; 2.5 mg all other days   Next INR check:  7/12/2024               Sent [employer] message with dosing and follow up instructions  VM left to check "[employer]    Contact [phone]  to schedule and with any changes, questions or concerns.     Education provided:   Please call back if any changes to your diet, medications or how you've been taking warfarin  Contact [phone]  with any changes, questions or concerns.     Plan made per [facility] anticoagulation protocol    Deb Roman RN  Anticoagulation Clinic  6/21/2024    _______________________________________________________________________     Anticoagulation Episode Summary       Current INR goal:  2.0-3.0   TTR:  69.7% (1 y)   Target end date:  Indefinite   Send INR reminders to:  [facility]    Indications    Long term current use of anticoagulant [Z79.01]  Cerebral artery occlusion with cerebral infarction (H) (Resolved) [I63.50]  Primary hypercoagulable state (H24) [D68.59]  History of stroke [Z86.73]             Comments:               Anticoagulation Care Providers       Provider Role Specialty Phone number    Karina Shah DO Referring Internal Medicine [phone]

## 2024-07-12 ENCOUNTER — LAB (OUTPATIENT)
Dept: LAB | Facility: CLINIC | Age: 49
End: 2024-07-12
Payer: COMMERCIAL

## 2024-07-12 ENCOUNTER — TELEPHONE (OUTPATIENT)
Dept: FAMILY MEDICINE | Facility: CLINIC | Age: 49
End: 2024-07-12

## 2024-07-12 ENCOUNTER — ANTICOAGULATION THERAPY VISIT (OUTPATIENT)
Dept: ANTICOAGULATION | Facility: CLINIC | Age: 49
End: 2024-07-12

## 2024-07-12 DIAGNOSIS — Z86.73 HISTORY OF STROKE: ICD-10-CM

## 2024-07-12 DIAGNOSIS — D68.59 PRIMARY HYPERCOAGULABLE STATE (H): ICD-10-CM

## 2024-07-12 DIAGNOSIS — Z79.01 LONG TERM CURRENT USE OF ANTICOAGULANT: ICD-10-CM

## 2024-07-12 DIAGNOSIS — D68.59 PRIMARY HYPERCOAGULABLE STATE (H): Primary | ICD-10-CM

## 2024-07-12 DIAGNOSIS — Z79.01 LONG TERM CURRENT USE OF ANTICOAGULANT: Primary | ICD-10-CM

## 2024-07-12 LAB — INR BLD: 3.3 (ref 0.9–1.1)

## 2024-07-12 PROCEDURE — 85610 PROTHROMBIN TIME: CPT

## 2024-07-12 PROCEDURE — 36416 COLLJ CAPILLARY BLOOD SPEC: CPT

## 2024-07-12 NOTE — PROGRESS NOTES
ANTICOAGULATION MANAGEMENT     Belkis Christopher 48 year old female is on warfarin with supratherapeutic INR result. (Goal INR 2.0-3.0)    Recent labs: (last 7 days)     07/12/24  1511   INR 3.3*       ASSESSMENT     Source(s): Chart Review and Patient/Caregiver Call     Warfarin doses taken: Warfarin taken as instructed  Diet: No new diet changes identified  Medication/supplement changes: None noted  New illness, injury, or hospitalization: No  Signs or symptoms of bleeding or clotting: No  Previous result: Therapeutic last visit; previously outside of goal range  Additional findings: None       PLAN     Recommended plan for no diet, medication or health factor changes affecting INR     Dosing Instructions: decrease your warfarin dose (10% change) with next INR in 2 weeks       Summary  As of 7/12/2024      Full warfarin instructions:  5 mg every Sun, Thu; 2.5 mg all other days   Next INR check:  7/26/2024               Telephone call with Belkis who verbalizes understanding and agrees to plan    Lab visit scheduled    Education provided: Goal range and lab monitoring: goal range and significance of current result    Plan made per [facility] anticoagulation protocol    Norma White RN  Anticoagulation Clinic  7/12/2024    _______________________________________________________________________     Anticoagulation Episode Summary       Current INR goal:  2.0-3.0   TTR:  68.1% (1 y)   Target end date:  Indefinite   Send INR reminders to:  [facility]    Indications    Long term current use of anticoagulant [Z79.01]  Cerebral artery occlusion with cerebral infarction (H) (Resolved) [I63.50]  Primary hypercoagulable state (H24) [D68.59]  History of stroke [Z86.73]             Comments:               Anticoagulation Care Providers       Provider Role Specialty Phone number    Karina Shah DO Referring Internal Medicine [phone]

## 2024-07-12 NOTE — TELEPHONE ENCOUNTER
ANTICOAGULATION CLINIC REFERRAL RENEWAL REQUEST       An annual renewal order is required for all patients referred to [facility].?  Please review and sign the pended referral order for Belkis Christopher.       ANTICOAGULATION SUMMARY      Warfarin indication(s)   Stroke and Hypercoaguable state, cerebral artery occlusion    Mechanical heart valve present?  NO       Current goal range   INR: 2.0-3.0     Goal appropriate for indication? Goal INR 2-3, standard for indication(s) above     Time in Therapeutic Range (TTR)  (Goal > 60%) 68.1%       Office visit with referring provider's group within last year yes on 12/14/23       Norma White RN  [facility]

## 2024-08-02 ENCOUNTER — ANTICOAGULATION THERAPY VISIT (OUTPATIENT)
Dept: ANTICOAGULATION | Facility: CLINIC | Age: 49
End: 2024-08-02

## 2024-08-02 ENCOUNTER — LAB (OUTPATIENT)
Dept: LAB | Facility: CLINIC | Age: 49
End: 2024-08-02
Payer: COMMERCIAL

## 2024-08-02 DIAGNOSIS — D68.59 PRIMARY HYPERCOAGULABLE STATE (H): ICD-10-CM

## 2024-08-02 DIAGNOSIS — Z79.01 LONG TERM CURRENT USE OF ANTICOAGULANT: ICD-10-CM

## 2024-08-02 DIAGNOSIS — Z79.01 LONG TERM CURRENT USE OF ANTICOAGULANT: Primary | ICD-10-CM

## 2024-08-02 DIAGNOSIS — Z86.73 HISTORY OF STROKE: ICD-10-CM

## 2024-08-02 LAB — INR BLD: 2 (ref 0.9–1.1)

## 2024-08-02 PROCEDURE — 85610 PROTHROMBIN TIME: CPT

## 2024-08-02 PROCEDURE — 36416 COLLJ CAPILLARY BLOOD SPEC: CPT

## 2024-08-02 NOTE — PROGRESS NOTES
ANTICOAGULATION MANAGEMENT     Belkis Christopher 48 year old female is on warfarin with therapeutic INR result. (Goal INR 2.0-3.0)    Recent labs: (last 7 days)     08/02/24  1603   INR 2.0*       ASSESSMENT     Source(s): Chart Review and Patient/Caregiver Call     Warfarin doses taken: Warfarin taken as instructed  Diet: No new diet changes identified  Medication/supplement changes: None noted  New illness, injury, or hospitalization: No  Signs or symptoms of bleeding or clotting: No  Previous result: Supratherapeutic  Additional findings:  request to return to previous warfarin dose     PLAN     Recommended plan for no diet, medication or health factor changes affecting INR     Dosing Instructions: Increase your warfarin dose (11.1% change) with next INR in 2 weeks       Shared clinical decision making with patient. Patient prefers to return to previous warfarin dose to be at the top end of her goal.    Summary  As of 8/2/2024      Full warfarin instructions:  5 mg every Sun, Tue, Thu; 2.5 mg all other days   Next INR check:  8/16/2024               Telephone call with Belkis who verbalizes understanding and agrees to plan    Lab visit scheduled    Education provided: Please call back if any changes to your diet, medications or how you've been taking warfarin    Plan made with [facility] Pharmacist Janeen Gan, GEORGE  Anticoagulation Clinic  8/2/2024    _______________________________________________________________________     Anticoagulation Episode Summary       Current INR goal:  2.0-3.0   TTR:  67.6% (1 y)   Target end date:  Indefinite   Send INR reminders to:  [facility]    Indications    Long term current use of anticoagulant [Z79.01]  Cerebral artery occlusion with cerebral infarction (H) (Resolved) [I63.50]  Primary hypercoagulable state (H24) [D68.59]  History of stroke [Z86.73]             Comments:               Anticoagulation Care Providers       Provider Role Specialty Phone number     Karina Shah,  [facility] Internal Medicine [phone]

## 2024-08-09 ENCOUNTER — TELEPHONE (OUTPATIENT)
Dept: FAMILY MEDICINE | Facility: CLINIC | Age: 49
End: 2024-08-09
Payer: COMMERCIAL

## 2024-08-09 DIAGNOSIS — E78.5 HYPERLIPIDEMIA LDL GOAL <70: Primary | ICD-10-CM

## 2024-08-09 NOTE — TELEPHONE ENCOUNTER
I put in the order as was noted on her last lipid panel. Advised to come fasting. She expressed understanding.    Zahida French RN

## 2024-08-09 NOTE — TELEPHONE ENCOUNTER
Order/Referral Request    Who is requesting: Patient    Orders being requested: Lipid Panel    Reason service is needed/diagnosis: Recheck after starting new medication    When are orders needed by: Patient would like to     Has this been discussed with Provider: Yes    Does patient have a preference on a Group/Provider/Facility? [place]    Does patient have an appointment scheduled?: Yes: Patient is having an INR done on 08/20/2024 and would like to have lipid done at the same time.    Where to send orders: Place orders within Epic    Could we send this information to you in [employer] or would you prefer to receive a phone call?:   Patient would like to be contacted via [employer]

## 2024-08-20 ENCOUNTER — LAB (OUTPATIENT)
Dept: LAB | Facility: CLINIC | Age: 49
End: 2024-08-20
Payer: COMMERCIAL

## 2024-08-20 ENCOUNTER — ANTICOAGULATION THERAPY VISIT (OUTPATIENT)
Dept: ANTICOAGULATION | Facility: CLINIC | Age: 49
End: 2024-08-20

## 2024-08-20 DIAGNOSIS — D68.59 PRIMARY HYPERCOAGULABLE STATE (H): ICD-10-CM

## 2024-08-20 DIAGNOSIS — Z86.73 HISTORY OF STROKE: ICD-10-CM

## 2024-08-20 DIAGNOSIS — Z79.01 LONG TERM CURRENT USE OF ANTICOAGULANT: ICD-10-CM

## 2024-08-20 DIAGNOSIS — Z79.01 LONG TERM CURRENT USE OF ANTICOAGULANT: Primary | ICD-10-CM

## 2024-08-20 DIAGNOSIS — E78.5 HYPERLIPIDEMIA LDL GOAL <70: ICD-10-CM

## 2024-08-20 LAB
CHOLEST SERPL-MCNC: 155 MG/DL
FASTING STATUS PATIENT QL REPORTED: YES
HDLC SERPL-MCNC: 44 MG/DL
INR BLD: 2.6 (ref 0.9–1.1)
LDLC SERPL CALC-MCNC: 87 MG/DL
NONHDLC SERPL-MCNC: 111 MG/DL
TRIGL SERPL-MCNC: 122 MG/DL

## 2024-08-20 PROCEDURE — 80061 LIPID PANEL: CPT

## 2024-08-20 PROCEDURE — 36415 COLL VENOUS BLD VENIPUNCTURE: CPT

## 2024-08-20 PROCEDURE — 85610 PROTHROMBIN TIME: CPT

## 2024-08-20 PROCEDURE — 36416 COLLJ CAPILLARY BLOOD SPEC: CPT

## 2024-08-20 NOTE — PROGRESS NOTES
ANTICOAGULATION MANAGEMENT     Belkis Christopher 48 year old female is on warfarin with therapeutic INR result. (Goal INR 2.0-3.0)    Recent labs: (last 7 days)     08/20/24  0740   INR 2.6*       ASSESSMENT     Source(s): Chart Review  Previous INR was Therapeutic last visit; previously outside of goal range 8/2 dosing increased by 11% per patient request   Medication, diet, health changes since last INR chart reviewed; none identified         PLAN     Recommended plan for no diet, medication or health factor changes affecting INR     Dosing Instructions: Continue your current warfarin dose with next INR in 3 weeks       Summary  As of 8/20/2024      Full warfarin instructions:  5 mg every Sun, Tue, Thu; 2.5 mg all other days   Next INR check:  9/10/2024               Sent [employer] message with dosing and follow up instructions    Contact [phone] to schedule and with any changes, questions or concerns.     Education provided: Goal range and lab monitoring: goal range and significance of current result  Contact [phone] with any changes, questions or concerns.     Plan made per [facility] anticoagulation protocol    Giselle Wang, RN  Anticoagulation Clinic  8/20/2024    _______________________________________________________________________     Anticoagulation Episode Summary       Current INR goal:  2.0-3.0   TTR:  72.5% (1 y)   Target end date:  Indefinite   Send INR reminders to:  [facility]    Indications    Long term current use of anticoagulant [Z79.01]  Cerebral artery occlusion with cerebral infarction (H) (Resolved) [I63.50]  Primary hypercoagulable state (H24) [D68.59]  History of stroke [Z86.73]             Comments:               Anticoagulation Care Providers       Provider Role Specialty Phone number    Karina Shah DO Referring Internal Medicine [phone]

## 2024-08-20 NOTE — RESULT ENCOUNTER NOTE
Ambar Christopher,     I am a covering provider for Dr. Shah who is currently out of office.     Based on the results of your lipid panel, your total cholesterol and LDL cholesterol have improved/decreased compared to 8 months ago.  You did have a mildly decreased HDL (healthy) cholesterol.     At this time, no medication changes are needed, in order to protect yourself from a future heart attack or stroke it is important that you: Try to limit your dietary intake of fatty, greasy, oily, fried, take out, and fast food when possible. Also, I would suggest you cut back on red and processed meats, sodium and sugar-sweetened foods and beverages. Regarding exercise, please get at least 30 minutes of CONTINUOUS moderate intensity aerobic exercise at least 3 times per week.    If you have any additional questions or concerns please make a follow-up appointment with your PCP.    Sincerely,     Josselyn Frederick MD

## 2024-09-10 DIAGNOSIS — Z79.01 LONG TERM CURRENT USE OF ANTICOAGULANT: ICD-10-CM

## 2024-09-10 RX ORDER — WARFARIN SODIUM 5 MG/1
TABLET ORAL
Qty: 70 TABLET | Refills: 1 | Status: SHIPPED | OUTPATIENT
Start: 2024-09-10

## 2024-09-10 NOTE — TELEPHONE ENCOUNTER
ANTICOAGULATION MANAGEMENT:  Medication Refill    Anticoagulation Summary  As of 8/20/2024      Warfarin maintenance plan:  5 mg (5 mg x 1) every Sun, Tue, Thu; 2.5 mg (5 mg x 0.5) all other days   Next INR check:  9/10/2024   Target end date:  Indefinite    Indications    Long term current use of anticoagulant [Z79.01]  Cerebral artery occlusion with cerebral infarction (H) (Resolved) [I63.50]  Primary hypercoagulable state (H24) [D68.59]  History of stroke [Z86.73]                 Anticoagulation Care Providers       Provider Role Specialty Phone number    Karina Shah DO Referring Internal Medicine [phone]            Refill Criteria    Visit with referring provider/group: Meets criteria: visit within referring provider group in the last 15 months on 12/14/23    [facility] referral last signed: 07/13/2024; within last year: Yes    Lab monitoring not exceeding 2 weeks overdue: Yes    Belkis meets all criteria for refill. Rx instructions and quantity match patient's current dosing plan. Warfarin 90 day supply with 1 refill granted per [facility] protocol     Norma White, RN  Anticoagulation Clinic

## 2024-09-17 ENCOUNTER — MYC MEDICAL ADVICE (OUTPATIENT)
Dept: ANTICOAGULATION | Facility: CLINIC | Age: 49
End: 2024-09-17
Payer: COMMERCIAL

## 2024-09-27 ENCOUNTER — LAB (OUTPATIENT)
Dept: LAB | Facility: CLINIC | Age: 49
End: 2024-09-27
Payer: COMMERCIAL

## 2024-09-27 ENCOUNTER — ANTICOAGULATION THERAPY VISIT (OUTPATIENT)
Dept: ANTICOAGULATION | Facility: CLINIC | Age: 49
End: 2024-09-27

## 2024-09-27 DIAGNOSIS — Z79.01 LONG TERM CURRENT USE OF ANTICOAGULANT: Primary | ICD-10-CM

## 2024-09-27 DIAGNOSIS — D68.59 PRIMARY HYPERCOAGULABLE STATE (H): ICD-10-CM

## 2024-09-27 DIAGNOSIS — Z86.73 HISTORY OF STROKE: ICD-10-CM

## 2024-09-27 DIAGNOSIS — Z79.01 LONG TERM CURRENT USE OF ANTICOAGULANT: ICD-10-CM

## 2024-09-27 LAB — INR BLD: 2.8 (ref 0.9–1.1)

## 2024-09-27 PROCEDURE — 85610 PROTHROMBIN TIME: CPT

## 2024-09-27 PROCEDURE — 36416 COLLJ CAPILLARY BLOOD SPEC: CPT

## 2024-09-27 NOTE — PROGRESS NOTES
ANTICOAGULATION MANAGEMENT     Belkis Christopher 48 year old female is on warfarin with therapeutic INR result. (Goal INR 2.0-3.0)    Recent labs: (last 7 days)     09/27/24  1453   INR 2.8*       ASSESSMENT     Source(s): Chart Review  Previous INR was Therapeutic last 2(+) visits  Medication, diet, health changes since last INR chart reviewed; none identified         PLAN     Recommended plan for no diet, medication or health factor changes affecting INR     Dosing Instructions: Continue your current warfarin dose with next INR in 4 weeks       Summary  As of 9/27/2024      Full warfarin instructions:  5 mg every Sun, Tue, Thu; 2.5 mg all other days   Next INR check:  10/25/2024               Detailed voice message left for Belkis with dosing instructions and follow up date.     Contact [phone] to schedule and with any changes, questions or concerns.     Education provided: Please call back if any changes to your diet, medications or how you've been taking warfarin  Goal range and lab monitoring: goal range and significance of current result    Plan made per [facility] anticoagulation protocol    Norma White RN  9/27/2024  Anticoagulation Clinic  [facility] for routing messages: arnaud BORREGO  [facility] patient phone line: [phone]        _______________________________________________________________________     Anticoagulation Episode Summary       Current INR goal:  2.0-3.0   TTR:  72.8% (1 y)   Target end date:  Indefinite   Send INR reminders to:  LACHELLE BORREGO    Indications    Long term current use of anticoagulant [Z79.01]  Cerebral artery occlusion with cerebral infarction (H) (Resolved) [I63.50]  Primary hypercoagulable state (H) [D68.59]  History of stroke [Z86.73]             Comments:               Anticoagulation Care Providers       Provider Role Specialty Phone number    Karina Shah DO Referring Internal Medicine [phone]

## 2024-10-01 ENCOUNTER — TELEPHONE (OUTPATIENT)
Dept: FAMILY MEDICINE | Facility: CLINIC | Age: 49
End: 2024-10-01
Payer: COMMERCIAL

## 2024-10-01 DIAGNOSIS — Z79.01 LONG TERM CURRENT USE OF ANTICOAGULANT: Primary | ICD-10-CM

## 2024-10-01 DIAGNOSIS — Z86.73 HISTORY OF STROKE: ICD-10-CM

## 2024-10-01 DIAGNOSIS — D68.59 PRIMARY HYPERCOAGULABLE STATE (H): ICD-10-CM

## 2024-10-01 NOTE — TELEPHONE ENCOUNTER
JUANITO-PROCEDURAL ANTICOAGULATION  MANAGEMENT    Belkis requesting pre-procedure hold orders for warfarin and review for bridging      Procedure date: 10/17/24       Procedure: Colonoscopy      Procedure location and phone number (if external): [place]     Number of warfarin hold days requested and/or target INR: unknown    Pre-op date: Not applicable      Routing to Anticoagulation Pharmacist for review.     [facility] pool: arnaud White RN

## 2024-10-02 RX ORDER — ENOXAPARIN SODIUM 100 MG/ML
100 INJECTION SUBCUTANEOUS EVERY 12 HOURS
Qty: 12 ML | Refills: 1 | Status: SHIPPED | OUTPATIENT
Start: 2024-10-02

## 2024-10-02 NOTE — TELEPHONE ENCOUNTER
Called and spoke w/ pt - discussed procedure plan in detail. Answered questions and sent prescription to preferred pharmacy.  Patient verbalizes understanding and agrees to plan. No further questions or concerns.  Vira Gan RN on 10/2/2024 at 3:45 PM

## 2024-10-02 NOTE — TELEPHONE ENCOUNTER
"Belkis Christopher  [address] [address]      Procedure Instructions for Anticoagulation     For your upcoming colonoscopy on 10/17/24 your healthcare provider wants you to stop warfarin as directed below. To protect you from a clot while your off your warfarin, your provider wants you to inject a short-acting medication called enoxaparin (Lovenox), this is called \"bridging.\"      Enoxaparin (Lovenox) is an injection that you or a caregiver can give at home. It is injected just underneath the skin in your stomach. Your anticoagulation nurse can explain to you how to give the injection if you have not done so before. Additionally a video is available at [email or web address]    Bring these instructions with you to your procedure to show the provider doing the procedure. Please discuss with the provider doing the procedure if it is okay to restart warfarin and enoxaparin as we have planned.      You will take enoxaparin 100 mg every 12 hours (one full syringe) as outlined below. A prescription was sent to [facility].    10/11/24, Take last dose of warfarin  10/12/24, NO warfarin  10/13/24, NO warfarin  10/14/24, NO warfarin, enoxaparin every 12 hours (AM and PM)  10/15/24, NO warfarin, enoxaparin every 12 hours (AM and PM)  10/16/24, NO warfarin, enoxaparin AM only (no enoxaparin 24 hours prior to surgery)    10/17/24, DAY OF PROCEDURE, NO enoxaparin. Restart warfarin 7.5 mg in the evening, if okay with provider doing the procedure.    Make sure to ask the provider doing your procedure if it is okay to begin your warfarin and enoxaparin as planned     10/18/24, Restart enoxaparin every 12 hours (AM and PM), if okay with provider doing the procedure, and 2.5 mg warfarin in the evening.   10/19/24, Enoxaparin every 12 hours (AM and PM) and 2.5 mg warfarin in the evening.  10/20/24, Enoxaparin every 12 hours (AM and PM) and 5 mg warfarin in the evening.  10/21/24, Enoxaparin " every 12 hours (AM and PM) and 2.5 mg warfarin in the evening.  10/22/24, Enoxaparin in the AM. Recheck INR. Anticoagulation clinic to call with further dosing instructions.     Please watch for symptoms of both bleeding and clotting while on warfarin and enoxaparin:    Call 911 or go to the emergency room if you are experiencing any of the following:   Coughing or throwing up blood, can't control bleeding from a cut or injury after putting pressure on it, have a sudden severe headache, have red or black stools, or have red or orange urine.  Chest pain or shortness of breath  Any symptoms of a stroke including: sudden numbness or weakness in your face, arm or leg; sudden confusion or trouble speaking, reading or understanding; sudden blurred or decreased vision; sudden trouble walking or moving a part of the body; sudden severe headache for no reason.    Call your care team if you have:   Bleeding gums, large bruises, pale skin.  Sudden pain, tenderness or swelling in your leg or arm    Please contact the Anticoagulation Clinic at [phone] if you have any questions or concerns.     Vira HUFFMAN RN

## 2024-10-02 NOTE — TELEPHONE ENCOUNTER
JUANITO-PROCEDURAL ANTICOAGULATION  MANAGEMENT    ASSESSMENT     Warfarin interruption plan for colonoscopy on 10/17/2024.    Indication for Anticoagulation: Stroke, Antiphospholipid Antibodies, and Heterozygous Factor V Leiden    Stroke ~2000  Cardiolipin IGG antibody + when tested 05/2006, 05/2018  Beta 2 glycoprotein IGG + 05/2018  Lupus anticoagulant + 05/2018 & 09/2018 (may reflect warfarin interference, notes state protein C & S not tested at time since actively taking warfarin)    Juanito-Procedure Risk stratification for thromboembolism: high (2022 Chest guidelines)    HIGH RISK: 2022 CHEST Perioperative Management guidelines suggests bridging patients at high risk for thromboembolism when interrupting warfarin for an elective surgery/procedure    RECOMMENDATION     Pre-Procedure:  Hold warfarin for 5 days, until after procedure starting: 10/12/2024   Enoxaparin (Lovenox) 100 mg subq Q 12 hrs (1 mg/kg Q 12 hrs for CrCl >= 60 ml/min and BMI <= 40 kg/m2)   Start enoxaparin: 10/14/2024  Last dose of enoxaparin prior to procedure: 10/16/2024 AM  (~24 hours prior to procedure)    Post-Procedure:  Resume warfarin dose if okay with provider doing procedure on night of procedure, 10/17/2024 PM: 7.5mg  Resume enoxaparin (Lovenox) ~ 24 hrs post procedure when okay with provider doing procedure. Continue until INR >= 2.0  Recheck INR ~5 days after resuming warfarin   ?     Plan routed to referring provider for approval  ?   Janeen Boyer [facility]    SUBJECTIVE/OBJECTIVE     Belkis Christopher, a 48 year old female    Goal INR Range: 2.0-3.0     Patient bridged in past: Not within [employer] system records, records at time of CVA not in EMR    Wt Readings from Last 3 Encounters:   12/14/23 96.2 kg (212 lb)   02/19/20 88.5 kg (195 lb)   03/05/19 94.3 kg (208 lb)      Ideal body weight: 59.3 kg (130 lb 11.7 oz)  Adjusted ideal body weight: 74 kg (163 lb 3.8 oz)     Estimated body mass index is 34.22 kg/m  as calculated from the  "following:    Height as of 12/14/23: 1.676 m (5' 6\").    Weight as of 12/14/23: 96.2 kg (212 lb).    Lab Results   Component Value Date    INR 2.8 (H) 09/27/2024    INR 2.6 (H) 08/20/2024    INR 2.0 (H) 08/02/2024     Lab Results   Component Value Date    HGB 13.1 12/14/2023    HCT 40.9 12/14/2023     12/14/2023     Lab Results   Component Value Date    CR 0.73 12/14/2023    CR 0.72 12/21/2020    CR 0.72 05/29/2020     Estimated Creatinine Clearance: 110.2 mL/min (based on SCr of 0.73 mg/dL).    "

## 2024-10-03 ENCOUNTER — ANESTHESIA EVENT (OUTPATIENT)
Dept: GASTROENTEROLOGY | Facility: CLINIC | Age: 49
End: 2024-10-03
Payer: COMMERCIAL

## 2024-10-03 NOTE — ANESTHESIA PREPROCEDURE EVALUATION
Anesthesia Pre-Procedure Evaluation    Patient: Belkis Christopher   MRN: [id number] : 1975        Procedure : Procedure(s):  Colonoscopy          Past Medical History:   Diagnosis Date     Factor 5 Leiden mutation, heterozygous (H)      High cholesterol      Obese      TIA (transient ischemic attack)     20years ago      Past Surgical History:   Procedure Laterality Date     DILATION AND CURETTAGE, HYSTEROSCOPY, ABLATE ENDOMETRIUM, COMBINED N/A 2019    Procedure: Hysteroscopy, endometrial sampling, endometrial ablation. Dee.;  Surgeon: Adis Brower MD;  Location: [place] OR     GYN SURGERY        Allergies   Allergen Reactions     Nka [No Known Allergies]       Social History     Tobacco Use     Smoking status: Never     Smokeless tobacco: Never   Substance Use Topics     Alcohol use: Yes     Comment: occ.      Wt Readings from Last 1 Encounters:   23 96.2 kg (212 lb)        Anesthesia Evaluation   Pt has had prior anesthetic. Type: General.        ROS/MED HX  ENT/Pulmonary:  - neg pulmonary ROS     Neurologic:     (+)          CVA,    TIA,                  Cardiovascular: Comment: Hx carotid artery dissection      (+) Dyslipidemia - -  CAD -  - -   Taking blood thinners                                   METS/Exercise Tolerance:     Hematologic: Comments: Factor V Leiden        Musculoskeletal:  - neg musculoskeletal ROS     GI/Hepatic:  - neg GI/hepatic ROS     Renal/Genitourinary:  - neg Renal ROS     Endo:     (+)          thyroid problem, hypothyroidism,    Obesity,       Psychiatric/Substance Use:  - neg psychiatric ROS     Infectious Disease:  - neg infectious disease ROS     Malignancy:  - neg malignancy ROS     Other:  - neg other ROS          Physical Exam    Airway        Mallampati: II   TM distance: > 3 FB   Neck ROM: full   Mouth opening: > 3 cm    Respiratory Devices and Support         Dental           Cardiovascular   cardiovascular exam normal          Pulmonary   pulmonary  "exam normal            OUTSIDE LABS:  CBC:   Lab Results   Component Value Date    WBC 6.1 12/14/2023    WBC 7.1 12/21/2020    HGB 13.1 12/14/2023    HGB 13.3 12/21/2020    HCT 40.9 12/14/2023    HCT 40.0 12/21/2020     12/14/2023     12/21/2020     BMP:   Lab Results   Component Value Date     12/14/2023     12/21/2020    POTASSIUM 3.9 12/14/2023    POTASSIUM 3.7 12/21/2020    CHLORIDE 102 12/14/2023    CHLORIDE 101 12/21/2020    CO2 25 12/14/2023    CO2 28 12/21/2020    BUN 19.6 12/14/2023    BUN 15 12/21/2020    CR 0.73 12/14/2023    CR 0.72 12/21/2020    GLC 95 12/14/2023    GLC 87 12/21/2020     COAGS:   Lab Results   Component Value Date    INR 2.8 (H) 09/27/2024     POC:   Lab Results   Component Value Date    HCG Negative 01/28/2019     HEPATIC: No results found for: \"ALBUMIN\", \"PROTTOTAL\", \"ALT\", \"AST\", \"GGT\", \"ALKPHOS\", \"BILITOTAL\", \"BILIDIRECT\", \"AIDE\"  OTHER:   Lab Results   Component Value Date    MARY 9.0 12/14/2023    TSH 5.10 (H) 05/29/2020    T4 0.92 05/29/2020    CRP 12.5 (H) 05/17/2018    SED 27 (H) 05/17/2018       Anesthesia Plan    ASA Status:  3       Anesthesia Type: General.   Induction: Propofol.   Maintenance: TIVA.        Consents    Anesthesia Plan(s) and associated risks, benefits, and realistic alternatives discussed. Questions answered and patient/representative(s) expressed understanding.     - Discussed: Risks, Benefits and Alternatives for BOTH SEDATION and the PROCEDURE were discussed     - Discussed with:  Patient            Postoperative Care    Pain management: IV analgesics, Oral pain medications, Multi-modal analgesia.   PONV prophylaxis: Ondansetron (or other 5HT-3), Dexamethasone or Solumedrol     Comments:             WESLY Colby CRNA    I have reviewed the pertinent notes and labs in the chart from the past 30 days and (re)examined the patient.  Any updates or changes from those notes are reflected in this note.            # " Coagulation Defect: INR = 2.8 (Ref range: 0.9 - 1.1) and/or PTT = N/A, will monitor for bleeding

## 2024-10-17 ENCOUNTER — ANESTHESIA (OUTPATIENT)
Dept: GASTROENTEROLOGY | Facility: CLINIC | Age: 49
End: 2024-10-17
Payer: COMMERCIAL

## 2024-10-17 ENCOUNTER — DOCUMENTATION ONLY (OUTPATIENT)
Dept: ANTICOAGULATION | Facility: CLINIC | Age: 49
End: 2024-10-17
Payer: COMMERCIAL

## 2024-10-17 ENCOUNTER — HOSPITAL ENCOUNTER (OUTPATIENT)
Facility: CLINIC | Age: 49
Discharge: HOME | End: 2024-10-17
Attending: STUDENT IN AN ORGANIZED HEALTH CARE EDUCATION/TRAINING PROGRAM | Admitting: STUDENT IN AN ORGANIZED HEALTH CARE EDUCATION/TRAINING PROGRAM
Payer: COMMERCIAL

## 2024-10-17 VITALS
TEMPERATURE: 98 F | SYSTOLIC BLOOD PRESSURE: 117 MMHG | RESPIRATION RATE: 14 BRPM | BODY MASS INDEX: 34.07 KG/M2 | HEART RATE: 73 BPM | OXYGEN SATURATION: 96 % | HEIGHT: 66 IN | DIASTOLIC BLOOD PRESSURE: 79 MMHG | WEIGHT: 212 LBS

## 2024-10-17 LAB — COLONOSCOPY: NORMAL

## 2024-10-17 PROCEDURE — 88305 TISSUE EXAM BY PATHOLOGIST: CPT | Mod: 26 | Performed by: PATHOLOGY

## 2024-10-17 PROCEDURE — 250N000009 HC RX 250: Performed by: PHYSICIAN ASSISTANT

## 2024-10-17 PROCEDURE — 88305 TISSUE EXAM BY PATHOLOGIST: CPT | Mod: TC | Performed by: STUDENT IN AN ORGANIZED HEALTH CARE EDUCATION/TRAINING PROGRAM

## 2024-10-17 PROCEDURE — 45378 DIAGNOSTIC COLONOSCOPY: CPT | Performed by: STUDENT IN AN ORGANIZED HEALTH CARE EDUCATION/TRAINING PROGRAM

## 2024-10-17 PROCEDURE — 45385 COLONOSCOPY W/LESION REMOVAL: CPT | Mod: PT | Performed by: STUDENT IN AN ORGANIZED HEALTH CARE EDUCATION/TRAINING PROGRAM

## 2024-10-17 PROCEDURE — 250N000011 HC RX IP 250 OP 636: Performed by: NURSE ANESTHETIST, CERTIFIED REGISTERED

## 2024-10-17 PROCEDURE — 250N000009 HC RX 250: Performed by: NURSE ANESTHETIST, CERTIFIED REGISTERED

## 2024-10-17 PROCEDURE — 370N000017 HC ANESTHESIA TECHNICAL FEE, PER MIN: Performed by: STUDENT IN AN ORGANIZED HEALTH CARE EDUCATION/TRAINING PROGRAM

## 2024-10-17 RX ORDER — NALOXONE HYDROCHLORIDE 0.4 MG/ML
0.2 INJECTION, SOLUTION INTRAMUSCULAR; INTRAVENOUS; SUBCUTANEOUS
Status: DISCONTINUED | OUTPATIENT
Start: 2024-10-17 | End: 2024-10-17 | Stop reason: HOSPADM

## 2024-10-17 RX ORDER — NALOXONE HYDROCHLORIDE 0.4 MG/ML
0.4 INJECTION, SOLUTION INTRAMUSCULAR; INTRAVENOUS; SUBCUTANEOUS
Status: DISCONTINUED | OUTPATIENT
Start: 2024-10-17 | End: 2024-10-17 | Stop reason: HOSPADM

## 2024-10-17 RX ORDER — PROPOFOL 10 MG/ML
INJECTION, EMULSION INTRAVENOUS PRN
Status: DISCONTINUED | OUTPATIENT
Start: 2024-10-17 | End: 2024-10-17

## 2024-10-17 RX ORDER — LIDOCAINE HYDROCHLORIDE 20 MG/ML
INJECTION, SOLUTION INFILTRATION; PERINEURAL PRN
Status: DISCONTINUED | OUTPATIENT
Start: 2024-10-17 | End: 2024-10-17

## 2024-10-17 RX ORDER — LIDOCAINE 40 MG/G
CREAM TOPICAL
Status: DISCONTINUED | OUTPATIENT
Start: 2024-10-17 | End: 2024-10-17 | Stop reason: HOSPADM

## 2024-10-17 RX ORDER — GLYCOPYRROLATE 0.2 MG/ML
INJECTION, SOLUTION INTRAMUSCULAR; INTRAVENOUS PRN
Status: DISCONTINUED | OUTPATIENT
Start: 2024-10-17 | End: 2024-10-17

## 2024-10-17 RX ORDER — FLUMAZENIL 0.1 MG/ML
0.2 INJECTION, SOLUTION INTRAVENOUS
Status: DISCONTINUED | OUTPATIENT
Start: 2024-10-17 | End: 2024-10-17 | Stop reason: HOSPADM

## 2024-10-17 RX ORDER — PROPOFOL 10 MG/ML
INJECTION, EMULSION INTRAVENOUS CONTINUOUS PRN
Status: DISCONTINUED | OUTPATIENT
Start: 2024-10-17 | End: 2024-10-17

## 2024-10-17 RX ADMIN — PROPOFOL 200 MCG/KG/MIN: 10 INJECTION, EMULSION INTRAVENOUS at 11:41

## 2024-10-17 RX ADMIN — LIDOCAINE HYDROCHLORIDE 0.1 ML: 10 INJECTION, SOLUTION EPIDURAL; INFILTRATION; INTRACAUDAL; PERINEURAL at 11:34

## 2024-10-17 RX ADMIN — PROPOFOL 100 MG: 10 INJECTION, EMULSION INTRAVENOUS at 11:41

## 2024-10-17 RX ADMIN — LIDOCAINE HYDROCHLORIDE 100 MG: 20 INJECTION, SOLUTION INFILTRATION; PERINEURAL at 11:41

## 2024-10-17 RX ADMIN — GLYCOPYRROLATE 0.1 MG: 0.2 INJECTION, SOLUTION INTRAMUSCULAR; INTRAVENOUS at 11:41

## 2024-10-17 ASSESSMENT — ACTIVITIES OF DAILY LIVING (ADL)
ADLS_ACUITY_SCORE: 35

## 2024-10-17 NOTE — ANESTHESIA CARE TRANSFER NOTE
Patient: Belkis Christopher    Procedure: Procedure(s):  Colonoscopy       Diagnosis: Screen for colon cancer [Z12.11]  Diagnosis Additional Information: No value filed.    Anesthesia Type:   General     Note:    Oropharynx: oropharynx clear of all foreign objects  Level of Consciousness: drowsy  Oxygen Supplementation: nasal cannula      Dentition: dentition unchanged  Vital Signs Stable: post-procedure vital signs reviewed and stable  Report to RN Given: handoff report given  Patient transferred to: Phase II    Handoff Report: Identifed the Patient, Identified the Reponsible Provider, Reviewed the pertinent medical history, Discussed the surgical course, Reviewed Intra-OP anesthesia mangement and issues during anesthesia, Set expectations for post-procedure period and Allowed opportunity for questions and acknowledgement of understanding      Vitals:  Vitals Value Taken Time   BP     Temp     Pulse     Resp     SpO2         Electronically Signed By: WESLY Abraham CRNA  October 17, 2024  12:06 PM

## 2024-10-17 NOTE — CONFIDENTIAL NOTE
"ANTICOAGULATION  MANAGEMENT: Discharge Review    Belkis Christopher chart reviewed for anticoagulation continuity of care    Outpatient surgery/procedure on 10/17/24 for colonoscopy.    Discharge disposition: Home    Results:    No results for input(s): \"INR\", \"[id number]\", \"F2\", \"ALMWH\", \"AAUFH\" in the last 168 hours.  Anticoagulation inpatient management:     not applicable     Anticoagulation discharge instructions:     Warfarin dosing:  booster dose then resume maintenance dose   Bridging: bridging with enoxaparin (Lovenox)   INR goal change: No      Medication changes affecting anticoagulation: No    Additional factors affecting anticoagulation: No     PLAN     Recommend to check INR on 10/22 as scheduled    Patient not contacted    No adjustment to Anticoagulation Calendar was required - planned procedure. [employer] sent to patient with instructions (previously).    iVra Gan RN  10/17/2024  Anticoagulation Clinic  [facility] for routing messages: arnaud BORREGO  [facility] patient phone line: [phone]   "

## 2024-10-17 NOTE — LETTER
Belkis Christopher  [address] [address]      October 21, 2024    Dear Belkis,  This letter is written to inform you of the results of your recent colonoscopy.  Your examination showed polyp(s) in your rectum. All polyps were removed in their entirety and sent for review by a pathologist. As you will see on the pathology report below, the tissue(s) were hyperplastic polyps. Your examination was otherwise without abnormality.    Hyperplastic polyps are entirely benign (non-cancerous) and rarely associated with the development of additional polyps or colorectal cancer.    Given these findings, I recommend that you undergo a repeat colonoscopy in ten years for screening. We will enter you into a recall system so you receive a reminder closer to the time that you are due for repeat examination. Your physician also recommends that you adhere to a high fiber diet indefinitely to promote colon health.     Please remember that this recommendation is made with the understanding that you are not experiencing persistent changes in bowel function, bleeding per rectum, and/or significant abdominal pain. If you experience these symptoms, please contact your primary care provider for a further evaluation.     If you have any questions or concerns about the results of your colonoscopy or the appropriate follow-up, please contact my assistant at [phone]    Sincerely,      Lee Blanca MD   [facility]  ___                    Resulted Orders   Surgical Pathology Exam   Result Value Ref Range    Case Report       Surgical Pathology Report                         Case: [id number]                                  Authorizing Provider:  Lee Blanca MD       Collected:           10/17/2024 12:01 PM          Ordering Location:     [facility]   Received:            10/17/2024 12:10 PM                                 [place]                                                              "         Pathologist:           Juan C Cedillo MD                                                          Specimen:    Rectum                                                                                     Final Diagnosis       A. Colon, Rectum, polyps x2, polypectomy:  -Hyperplastic polyps  -Negative for dysplasia or malignancy.        Clinical Information       Procedure:  Colonoscopy  Pre-op Diagnosis: Screen for colon cancer [Z12.11]  Post-op Diagnosis: Z12.11 - Screen for colon cancer [ICD-10-CM]      Gross Description       A(1). Rectum, :  The specimen is received in formalin, labeled with the patient's name, medical record number and other identifying information designated \"rectum\". It consists of 2, 0.6 cm (inked blue) and 0.9 cm (inked black) pale-tan soft tissue fragments which are submitted entirely in 1 cassette.    A1-2 soft tissue fragments (1 inked blue-bisected/1 inked black-trisected)   (MENDEZ Heath (ASCP) 10/18/2024 7:40 AM       Microscopic Description       Microscopic examination was performed.        Performing Labs       The technical component of this testing was completed at [facility].    Stain controls for all stains resulted within this report have been reviewed and show appropriate reactivity.       Case Images               "

## 2024-10-17 NOTE — ANESTHESIA POSTPROCEDURE EVALUATION
Patient: Belkis Christopher    Procedure: Procedure(s):  Colonoscopy       Anesthesia Type:  General    Note:  Disposition: Outpatient   Postop Pain Control: Uneventful            Sign Out: Well controlled pain   PONV: No   Neuro/Psych: Uneventful            Sign Out: Acceptable/Baseline neuro status   Airway/Respiratory: Uneventful            Sign Out: Acceptable/Baseline resp. status   CV/Hemodynamics: Uneventful            Sign Out: Acceptable CV status; No obvious hypovolemia; No obvious fluid overload   Other NRE: NONE   DID A NON-ROUTINE EVENT OCCUR? No           Last vitals:  Vitals Value Taken Time   /74 10/17/24 1205   Temp     Pulse 85 10/17/24 1205   Resp     SpO2 97 % 10/17/24 1208   Vitals shown include unfiled device data.    Electronically Signed By: WESLY Abraham CRNA  October 17, 2024  12:09 PM

## 2024-10-17 NOTE — H&P
[facility]    Pre-Endoscopy History and Physical     Belkis Christopher MRN# [id number]   YOB: 1975 Age: 48 year old     Date of Procedure: 10/17/2024  Primary care provider: Karina Shah  Type of Endoscopy: Colonoscopy with possible biopsy, possible polypectomy  Reason for Procedure: screening  Type of Anesthesia Anticipated: Conscious Sedation    HPI:    Belkis is a 48 year old female who will be undergoing the above procedure.      A history and physical has been performed. The patient's medications and allergies have been reviewed. The risks and benefits of the procedure and the sedation options and risks were discussed with the patient.  All questions were answered and informed consent was obtained.      She denies a personal or family history of anesthesia complications or bleeding disorders.     Colonoscopy, first one, screening. ASA II. Takes warfarin, hold for 5 days. No significant surgical or family hx.     Patient Active Problem List   Diagnosis    Long term current use of anticoagulant    History of stroke    Hyperlipidemia LDL goal <70    Subclinical hypothyroidism    Primary hypercoagulable state (H)    Vitamin D deficiency    Menorrhagia with regular cycle    Carotid artery dissection (H)    ASCUS of cervix with negative high risk HPV        Past Medical History:   Diagnosis Date    Factor 5 Leiden mutation, heterozygous (H)     High cholesterol     Obese     TIA (transient ischemic attack)     20years ago        Past Surgical History:   Procedure Laterality Date    DILATION AND CURETTAGE, HYSTEROSCOPY, ABLATE ENDOMETRIUM, COMBINED N/A 01/28/2019    Procedure: Hysteroscopy, endometrial sampling, endometrial ablation. Dee.;  Surgeon: Adis Brower MD;  Location: [place] OR    GYN SURGERY         Social History     Tobacco Use    Smoking status: Never    Smokeless tobacco: Never   Substance Use Topics    Alcohol use: Yes     Comment: occ.       Family  "History   Problem Relation Age of Onset    Hypertension Father        Prior to Admission medications    Medication Sig Start Date End Date Taking? Authorizing Provider   cholecalciferol (VITAMIN D3) 125 mcg (5000 units) capsule Take 5,000 mcg by mouth daily   Yes Reported, Patient   coenzyme Q-10 200 MG CAPS Take 200 mg by mouth daily 8/4/16  Yes Efra Rosa MD   rosuvastatin (CRESTOR) 40 MG tablet Take 1 tablet (40 mg) by mouth daily 12/14/23  Yes Karina Shah DO   warfarin ANTICOAGULANT (JANTOVEN ANTICOAGULANT) 5 MG tablet TAKE ONE TABLET BY MOUTH EVERY SUNDAY, TUESDAY, THURSDAY; TAKE ONE-HALF TABLET ALL OTHER DAYS OR AS DIRECTED BY ANTICOAGULATION CLINIC 9/10/24  Yes Karina Shah DO   enoxaparin ANTICOAGULANT (LOVENOX) 100 MG/ML syringe Inject 1 mL (100 mg) subcutaneously every 12 hours. 10/2/24   Karina Shah DO       Allergies   Allergen Reactions    Nka [No Known Allergies]         REVIEW OF SYSTEMS:   5 point ROS negative except as noted above in HPI, including Gen., Resp., CV, GI &  system review.    PHYSICAL EXAM:   /85 (BP Location: Right arm)   Pulse 77   Temp 98.4  F (36.9  C) (Oral)   Resp 16   Ht 1.676 m (5' 6\")   Wt 96.2 kg (212 lb)   SpO2 99%   BMI 34.22 kg/m   Estimated body mass index is 34.22 kg/m  as calculated from the following:    Height as of this encounter: 1.676 m (5' 6\").    Weight as of this encounter: 96.2 kg (212 lb).   Constitutional: Awake, alert, no acute distress.  Eyes: No scleral icterus.  Conjunctiva are without injection.  ENMT: Mucous membranes moist, dentition and gums are intact.   Neck: Soft, supple, trachea midline.    Endocrine: n/a   Lymphatic: There is no cervical, submandibularadenopathy.  Respiratory: normal efforts on room air  Cardiovascular: extremities warm and well perfused  Abdomen: Non-distended, non-tender,  No masses,  Musculoskeletal: Full range of motion in the upper and lower extremities.    Skin: No " skin rashes or lesions to inspection.  No petechia.    Neurologic: alerted and oriented 3x  Psychiatric: The patient's affect is not blunted and mood is appropriate.  DIAGNOSTICS:    Not indicated    IMPRESSION   ASA Class 2 - Mild systemic disease    PLAN:   Plan for Colonoscopy with possible biopsy, possible polypectomy. We discussed the risks, benefits and alternatives and the patient wished to proceed.  Patient is cleared for the above procedure.    The above has been forwarded to the consulting provider.    Lee Blanca MD on 10/17/2024 at 10:29 AM  [facility]

## 2024-10-21 LAB
PATH REPORT.COMMENTS IMP SPEC: NORMAL
PATH REPORT.COMMENTS IMP SPEC: NORMAL
PATH REPORT.FINAL DX SPEC: NORMAL
PATH REPORT.GROSS SPEC: NORMAL
PATH REPORT.MICROSCOPIC SPEC OTHER STN: NORMAL
PATH REPORT.RELEVANT HX SPEC: NORMAL
PHOTO IMAGE: NORMAL

## 2024-10-22 ENCOUNTER — LAB (OUTPATIENT)
Dept: LAB | Facility: CLINIC | Age: 49
End: 2024-10-22
Payer: COMMERCIAL

## 2024-10-22 ENCOUNTER — ANTICOAGULATION THERAPY VISIT (OUTPATIENT)
Dept: ANTICOAGULATION | Facility: CLINIC | Age: 49
End: 2024-10-22

## 2024-10-22 ENCOUNTER — IMMUNIZATION (OUTPATIENT)
Dept: FAMILY MEDICINE | Facility: CLINIC | Age: 49
End: 2024-10-22
Payer: COMMERCIAL

## 2024-10-22 DIAGNOSIS — D68.59 PRIMARY HYPERCOAGULABLE STATE (H): ICD-10-CM

## 2024-10-22 DIAGNOSIS — Z79.01 LONG TERM CURRENT USE OF ANTICOAGULANT: Primary | ICD-10-CM

## 2024-10-22 DIAGNOSIS — Z86.73 HISTORY OF STROKE: ICD-10-CM

## 2024-10-22 DIAGNOSIS — E03.8 SUBCLINICAL HYPOTHYROIDISM: Primary | ICD-10-CM

## 2024-10-22 DIAGNOSIS — Z79.01 LONG TERM CURRENT USE OF ANTICOAGULANT: ICD-10-CM

## 2024-10-22 LAB — INR BLD: 1.5 (ref 0.9–1.1)

## 2024-10-22 PROCEDURE — 90656 IIV3 VACC NO PRSV 0.5 ML IM: CPT

## 2024-10-22 PROCEDURE — 36416 COLLJ CAPILLARY BLOOD SPEC: CPT

## 2024-10-22 PROCEDURE — 90471 IMMUNIZATION ADMIN: CPT

## 2024-10-22 PROCEDURE — 85610 PROTHROMBIN TIME: CPT

## 2024-10-22 NOTE — PROGRESS NOTES
ANTICOAGULATION MANAGEMENT     Belkis Christopher 48 year old female is on warfarin with subtherapeutic INR result. (Goal INR 2.0-3.0)    Recent labs: (last 7 days)     10/22/24  1543   INR 1.5*       ASSESSMENT     Source(s): Chart Review and Patient/Caregiver Call     Warfarin doses taken: Resumed warfarin on 10/17 after interruption for surgery/procedure which may be affecting INR  Diet: No new diet changes identified  Medication/supplement changes: None noted  New illness, injury, or hospitalization: No  Signs or symptoms of bleeding or clotting: No  Previous result: Therapeutic last 2(+) visits  Additional findings: None and Bridging with Enoxaparin until INR >= 2.0       PLAN     Recommended plan for temporary change(s) affecting INR     Dosing Instructions: booster dose then continue your current warfarin dose with next INR in 3 days   Patient is out of lovenox and declines going to pharmacy to pick any more up    Summary  As of 10/22/2024      Full warfarin instructions:  10/22: 10 mg; Otherwise 5 mg every Sun, Tue, Thu; 2.5 mg all other days   Next INR check:  10/25/2024               Telephone call with Belkis who verbalizes understanding and agrees to plan    Lab visit scheduled    Education provided: Goal range and lab monitoring: goal range and significance of current result    Plan made per [facility] anticoagulation protocol    Norma White RN  10/22/2024  Anticoagulation Clinic  [employer] for routing messages: arnaud BORREGO  [facility] patient phone line: [phone]        _______________________________________________________________________     Anticoagulation Episode Summary       Current INR goal:  2.0-3.0   TTR:  70.2% (1 y)   Target end date:  Indefinite   Send INR reminders to:  LACHELLE BORREGO    Indications    Long term current use of anticoagulant [Z79.01]  Cerebral artery occlusion with cerebral infarction (H) (Resolved) [I63.50]  Primary hypercoagulable state (H) [D68.59]  History of stroke  [Z86.73]             Comments:               Anticoagulation Care Providers       Provider Role Specialty Phone number    Karina Shah DO Referring Internal Medicine [phone]

## 2024-10-25 ENCOUNTER — LAB (OUTPATIENT)
Dept: LAB | Facility: CLINIC | Age: 49
End: 2024-10-25
Payer: COMMERCIAL

## 2024-10-25 ENCOUNTER — ANTICOAGULATION THERAPY VISIT (OUTPATIENT)
Dept: ANTICOAGULATION | Facility: CLINIC | Age: 49
End: 2024-10-25

## 2024-10-25 DIAGNOSIS — Z79.01 LONG TERM CURRENT USE OF ANTICOAGULANT: ICD-10-CM

## 2024-10-25 DIAGNOSIS — Z79.01 LONG TERM CURRENT USE OF ANTICOAGULANT: Primary | ICD-10-CM

## 2024-10-25 DIAGNOSIS — D68.59 PRIMARY HYPERCOAGULABLE STATE (H): ICD-10-CM

## 2024-10-25 DIAGNOSIS — Z86.73 HISTORY OF STROKE: ICD-10-CM

## 2024-10-25 DIAGNOSIS — E03.8 SUBCLINICAL HYPOTHYROIDISM: Primary | ICD-10-CM

## 2024-10-25 LAB — INR BLD: 2.5 (ref 0.9–1.1)

## 2024-10-25 PROCEDURE — 85610 PROTHROMBIN TIME: CPT

## 2024-10-25 PROCEDURE — 36416 COLLJ CAPILLARY BLOOD SPEC: CPT

## 2024-10-25 NOTE — PROGRESS NOTES
ANTICOAGULATION MANAGEMENT     Belkis Christopher 48 year old female is on warfarin with therapeutic INR result. (Goal INR 2.0-3.0)    Recent labs: (last 7 days)     10/25/24  0901   INR 2.5*       ASSESSMENT     Source(s): Chart Review and Patient/Caregiver Call     Warfarin doses taken: Booster dose(s) recently taken which may be affecting INR (recent procedure hold)  Diet: No new diet changes identified  Medication/supplement changes: None noted  New illness, injury, or hospitalization: No  Signs or symptoms of bleeding or clotting: No  Previous result: Subtherapeutic  Additional findings: Bridging with Enoxaparin until INR >= 2.0 (last dose patient took of enoxaparin was on Monday)     PLAN     Recommended plan for temporary change(s) affecting INR     Dosing Instructions: Continue your current warfarin dose with next INR in 2 weeks       Continue without Lovenox. No further injections at this time.    Summary  As of 10/25/2024      Full warfarin instructions:  5 mg every Sun, Tue, Thu; 2.5 mg all other days   Next INR check:  11/8/2024               Telephone call with Belkis who verbalizes understanding and agrees to plan    Lab visit scheduled    Education provided: Please call back if any changes to your diet, medications or how you've been taking warfarin    Plan made per [facility] anticoagulation protocol    Vira Gan RN  10/25/2024  Anticoagulation Clinic  [facility] for routing messages: arnaud BORREGO  [facility] patient phone line: [phone]        _______________________________________________________________________     Anticoagulation Episode Summary       Current INR goal:  2.0-3.0   TTR:  69.8% (1 y)   Target end date:  Indefinite   Send INR reminders to:  LACHELLE BORREGO    Indications    Long term current use of anticoagulant [Z79.01]  Cerebral artery occlusion with cerebral infarction (H) (Resolved) [I63.50]  Primary hypercoagulable state (H) [D68.59]  History of stroke [Z86.73]              Comments:  --             Anticoagulation Care Providers       Provider Role Specialty Phone number    Karina Shah DO Referring Internal Medicine [phone]

## 2024-11-08 ENCOUNTER — LAB (OUTPATIENT)
Dept: LAB | Facility: CLINIC | Age: 49
End: 2024-11-08
Payer: COMMERCIAL

## 2024-11-08 ENCOUNTER — ANTICOAGULATION THERAPY VISIT (OUTPATIENT)
Dept: ANTICOAGULATION | Facility: CLINIC | Age: 49
End: 2024-11-08

## 2024-11-08 DIAGNOSIS — Z79.01 LONG TERM CURRENT USE OF ANTICOAGULANT: Primary | ICD-10-CM

## 2024-11-08 DIAGNOSIS — D68.59 PRIMARY HYPERCOAGULABLE STATE (H): ICD-10-CM

## 2024-11-08 DIAGNOSIS — Z79.01 LONG TERM CURRENT USE OF ANTICOAGULANT: ICD-10-CM

## 2024-11-08 DIAGNOSIS — Z86.73 HISTORY OF STROKE: ICD-10-CM

## 2024-11-08 LAB — INR BLD: 2.4 (ref 0.9–1.1)

## 2024-11-08 PROCEDURE — 36416 COLLJ CAPILLARY BLOOD SPEC: CPT

## 2024-11-08 PROCEDURE — 85610 PROTHROMBIN TIME: CPT

## 2024-11-08 NOTE — PROGRESS NOTES
ANTICOAGULATION MANAGEMENT     Belkis Christopher 48 year old female is on warfarin with therapeutic INR result. (Goal INR 2.0-3.0)    Recent labs: (last 7 days)     11/08/24  0729   INR 2.4*       ASSESSMENT     Source(s): Chart Review and Patient/Caregiver Call     Warfarin doses taken: Warfarin taken as instructed  Diet: No new diet changes identified  Medication/supplement changes: None noted  New illness, injury, or hospitalization: No  Signs or symptoms of bleeding or clotting: No  Previous result: Therapeutic last visit; previously outside of goal range  Additional findings: None     PLAN     Recommended plan for no diet, medication or health factor changes affecting INR     Dosing Instructions: Continue your current warfarin dose with next INR in 2 weeks       Summary  As of 11/8/2024      Full warfarin instructions:  5 mg every Sun, Tue, Thu; 2.5 mg all other days   Next INR check:  11/22/2024               Telephone call with Belkis who verbalizes understanding and agrees to plan    Lab visit scheduled    Education provided: Please call back if any changes to your diet, medications or how you've been taking warfarin    Plan made per [facility] anticoagulation protocol    Vira Gan RN  11/8/2024  Anticoagulation Clinic  [employer] for routing messages: arnaud BORREGO  [facility] patient phone line: [phone]        _______________________________________________________________________     Anticoagulation Episode Summary       Current INR goal:  2.0-3.0   TTR:  69.8% (1 y)   Target end date:  Indefinite   Send INR reminders to:  LACHELLE BORREGO    Indications    Long term current use of anticoagulant [Z79.01]  Cerebral artery occlusion with cerebral infarction (H) (Resolved) [I63.50]  Primary hypercoagulable state (H) [D68.59]  History of stroke [Z86.73]             Comments:  --             Anticoagulation Care Providers       Provider Role Specialty Phone number    Karina Shah DO Referring Internal  Medicine [phone]

## 2024-11-14 ENCOUNTER — PATIENT OUTREACH (OUTPATIENT)
Dept: CARE COORDINATION | Facility: CLINIC | Age: 49
End: 2024-11-14
Payer: COMMERCIAL

## 2024-11-28 ENCOUNTER — PATIENT OUTREACH (OUTPATIENT)
Dept: CARE COORDINATION | Facility: CLINIC | Age: 49
End: 2024-11-28
Payer: COMMERCIAL

## 2024-12-26 ENCOUNTER — LAB (OUTPATIENT)
Dept: LAB | Facility: CLINIC | Age: 49
End: 2024-12-26
Payer: COMMERCIAL

## 2024-12-26 ENCOUNTER — ANTICOAGULATION THERAPY VISIT (OUTPATIENT)
Dept: ANTICOAGULATION | Facility: CLINIC | Age: 49
End: 2024-12-26

## 2024-12-26 DIAGNOSIS — D68.59 PRIMARY HYPERCOAGULABLE STATE (H): ICD-10-CM

## 2024-12-26 DIAGNOSIS — Z79.01 LONG TERM CURRENT USE OF ANTICOAGULANT: ICD-10-CM

## 2024-12-26 DIAGNOSIS — Z86.73 HISTORY OF STROKE: ICD-10-CM

## 2024-12-26 DIAGNOSIS — Z79.01 LONG TERM CURRENT USE OF ANTICOAGULANT: Primary | ICD-10-CM

## 2024-12-26 LAB — INR BLD: 1.9 (ref 0.9–1.1)

## 2025-04-15 ENCOUNTER — LAB (OUTPATIENT)
Dept: LAB | Facility: CLINIC | Age: 50
End: 2025-04-15
Payer: COMMERCIAL

## 2025-04-15 ENCOUNTER — ANTICOAGULATION THERAPY VISIT (OUTPATIENT)
Dept: ANTICOAGULATION | Facility: CLINIC | Age: 50
End: 2025-04-15

## 2025-04-15 DIAGNOSIS — D68.59 PRIMARY HYPERCOAGULABLE STATE: ICD-10-CM

## 2025-04-15 DIAGNOSIS — Z79.01 LONG TERM CURRENT USE OF ANTICOAGULANT: Primary | ICD-10-CM

## 2025-04-15 DIAGNOSIS — Z79.01 LONG TERM CURRENT USE OF ANTICOAGULANT: ICD-10-CM

## 2025-04-15 DIAGNOSIS — Z86.73 HISTORY OF STROKE: ICD-10-CM

## 2025-04-15 LAB — INR BLD: 3.3 (ref 0.9–1.1)

## 2025-04-15 PROCEDURE — 36416 COLLJ CAPILLARY BLOOD SPEC: CPT

## 2025-04-15 PROCEDURE — 85610 PROTHROMBIN TIME: CPT

## 2025-04-15 NOTE — PROGRESS NOTES
ANTICOAGULATION MANAGEMENT     Belkis Christopher 49 year old female is on warfarin with supratherapeutic INR result. (Goal INR 2.0-3.0)    Recent labs: (last 7 days)     04/15/25  1549   INR 3.3*       ASSESSMENT     Source(s): Chart Review  Previous INR was Therapeutic last 2(+) visits  Medication, diet, health changes since last INR chart reviewed; none identified         PLAN     Unable to reach Belkis today.    Left message to continue current dose of warfarin 5 mg tonight. Request call back for assessment.  My chart message also sent to patient     Follow up required to discuss out of range result     Radha Landa, RN  4/15/2025  Anticoagulation Clinic  [facility] for routing messages: arnaud BORREGO  [facility] patient phone line: [phone]

## 2025-04-16 NOTE — PROGRESS NOTES
ANTICOAGULATION MANAGEMENT     Belkis Christopher 49 year old female is on warfarin with supratherapeutic INR result. (Goal INR 2.0-3.0)    Recent labs: (last 7 days)     04/15/25  1549   INR 3.3*       ASSESSMENT     Source(s): Chart Review     Warfarin doses taken: Warfarin taken differently, but did not change total weekly dose and Missed dose(s) may be affecting INR  Diet: No new diet changes identified  Medication/supplement changes: None noted  New illness, injury, or hospitalization: No  Signs or symptoms of bleeding or clotting: No  Previous result: Therapeutic last 2(+) visits  Additional findings: None       PLAN     Recommended plan for temporary change(s) affecting INR     Dosing Instructions: Continue your current warfarin dose with next INR in 2 weeks       Summary  As of 4/15/2025      Full warfarin instructions:  5 mg every Sun, Tue, Thu; 2.5 mg all other days   Next INR check:  4/29/2025               Sent [employer] message with dosing and follow up instructions    Contact [phone] to schedule and with any changes, questions or concerns.     Education provided: Please call back if any changes to your diet, medications or how you've been taking warfarin  Goal range and lab monitoring: goal range and significance of current result    Plan made per [facility] anticoagulation protocol    Norma White RN  4/16/2025  Anticoagulation Clinic  [employer] for routing messages: arnaud BORREGO  [facility] patient phone line: [phone]        _______________________________________________________________________     Anticoagulation Episode Summary       Current INR goal:  2.0-3.0   TTR:  79.2% (1 y)   Target end date:  Indefinite   Send INR reminders to:  LACHELLE BORREGO    Indications    Long term current use of anticoagulant [Z79.01]  Cerebral artery occlusion with cerebral infarction (H) (Resolved) [I63.50]  Primary hypercoagulable state [D68.59]  History of stroke [Z86.73]             Comments:  --              Anticoagulation Care Providers       Provider Role Specialty Phone number    Karina Shah DO Referring Internal Medicine [phone]

## 2025-05-01 ENCOUNTER — ANTICOAGULATION THERAPY VISIT (OUTPATIENT)
Dept: ANTICOAGULATION | Facility: CLINIC | Age: 50
End: 2025-05-01

## 2025-05-01 ENCOUNTER — LAB (OUTPATIENT)
Dept: LAB | Facility: CLINIC | Age: 50
End: 2025-05-01
Payer: COMMERCIAL

## 2025-05-01 DIAGNOSIS — Z86.73 HISTORY OF STROKE: ICD-10-CM

## 2025-05-01 DIAGNOSIS — Z79.01 LONG TERM CURRENT USE OF ANTICOAGULANT: Primary | ICD-10-CM

## 2025-05-01 DIAGNOSIS — D68.59 PRIMARY HYPERCOAGULABLE STATE: ICD-10-CM

## 2025-05-01 DIAGNOSIS — Z79.01 LONG TERM CURRENT USE OF ANTICOAGULANT: ICD-10-CM

## 2025-05-01 LAB — INR BLD: 1.9 (ref 0.9–1.1)

## 2025-05-01 NOTE — PROGRESS NOTES
ANTICOAGULATION MANAGEMENT     Belkis Christopher 49 year old female is on warfarin with subtherapeutic INR result. (Goal INR 2.0-3.0)    Recent labs: (last 7 days)     05/01/25  1540   INR 1.9*       ASSESSMENT     Source(s): Chart Review  Previous INR was Supratherapeutic  Medication, diet, health changes since last INR chart reviewed; none identified         PLAN     Recommended plan for no diet, medication or health factor changes affecting INR     Dosing Instructions: Continue your current warfarin dose with next INR in 2 weeks       Summary  As of 5/1/2025      Full warfarin instructions:  5 mg every Sun, Tue, Thu; 2.5 mg all other days   Next INR check:  5/15/2025               Detailed voice message left for Belkis with dosing instructions and follow up date.     Contact [phone] to schedule and with any changes, questions or concerns.     Education provided: Please call back if any changes to your diet, medications or how you've been taking warfarin  Goal range and lab monitoring: goal range and significance of current result    Plan made per [facility] anticoagulation protocol    Norma White RN  5/1/2025  Anticoagulation Clinic  [employer] for routing messages: arnaud BORREGO  [facility] patient phone line: [phone]        _______________________________________________________________________     Anticoagulation Episode Summary       Current INR goal:  2.0-3.0   TTR:  78.0% (1 y)   Target end date:  Indefinite   Send INR reminders to:  LACHELLE BORREGO    Indications    Long term current use of anticoagulant [Z79.01]  Cerebral artery occlusion with cerebral infarction (H) (Resolved) [I63.50]  Primary hypercoagulable state [D68.59]  History of stroke [Z86.73]             Comments:  --             Anticoagulation Care Providers       Provider Role Specialty Phone number    Karina Shah DO Referring Internal Medicine [phone]

## 2025-06-27 ENCOUNTER — DOCUMENTATION ONLY (OUTPATIENT)
Dept: ANTICOAGULATION | Facility: CLINIC | Age: 50
End: 2025-06-27

## 2025-06-27 DIAGNOSIS — Z79.01 LONG TERM CURRENT USE OF ANTICOAGULANT: ICD-10-CM

## 2025-06-27 DIAGNOSIS — D68.59 PRIMARY HYPERCOAGULABLE STATE: ICD-10-CM

## 2025-06-27 DIAGNOSIS — Z86.73 HISTORY OF STROKE: Primary | ICD-10-CM

## 2025-06-27 NOTE — PROGRESS NOTES
ANTICOAGULATION CLINIC REFERRAL RENEWAL REQUEST       An annual renewal order is required for all patients referred to [facility].?  Please review and sign the pended referral order for Belkis Christopher.       ANTICOAGULATION SUMMARY      Warfarin indication(s)   Stroke and Hypercoagulable state, cerebral artery occlusion     Mechanical heart valve present?  NO       Current goal range   INR: 2.0-3.0     Goal appropriate for indication? Goal INR 2-3, standard for indication(s) above     Time in Therapeutic Range (TTR)  (Goal > 60%) 78.4%       Office visit with referring provider's group within last year yes on 2/7/25   Additional standing orders None       Norma White, GEORGE  [facility]

## 2025-07-29 ENCOUNTER — LAB (OUTPATIENT)
Dept: LAB | Facility: CLINIC | Age: 50
End: 2025-07-29
Payer: COMMERCIAL

## 2025-07-29 ENCOUNTER — ANTICOAGULATION THERAPY VISIT (OUTPATIENT)
Dept: ANTICOAGULATION | Facility: CLINIC | Age: 50
End: 2025-07-29

## 2025-07-29 ENCOUNTER — RESULTS FOLLOW-UP (OUTPATIENT)
Dept: ANTICOAGULATION | Facility: CLINIC | Age: 50
End: 2025-07-29

## 2025-07-29 DIAGNOSIS — Z86.73 HISTORY OF STROKE: ICD-10-CM

## 2025-07-29 DIAGNOSIS — D68.59 PRIMARY HYPERCOAGULABLE STATE: ICD-10-CM

## 2025-07-29 DIAGNOSIS — Z79.01 LONG TERM CURRENT USE OF ANTICOAGULANT: Primary | ICD-10-CM

## 2025-07-29 DIAGNOSIS — Z79.01 LONG TERM CURRENT USE OF ANTICOAGULANT: ICD-10-CM

## 2025-07-29 LAB — INR BLD: 2.4 (ref 0.9–1.1)

## 2025-07-29 PROCEDURE — 85610 PROTHROMBIN TIME: CPT

## 2025-07-29 PROCEDURE — 36416 COLLJ CAPILLARY BLOOD SPEC: CPT

## 2025-07-29 NOTE — PROGRESS NOTES
ANTICOAGULATION MANAGEMENT     Belkis Christopher 49 year old female is on warfarin with therapeutic INR result. (Goal INR 2.0-3.0)    Recent labs: (last 7 days)     07/29/25  1526   INR 2.4*       ASSESSMENT     Source(s): Chart Review  Previous INR was Therapeutic last 2(+) visits  Medication, diet, health changes since last INR: chart reviewed; none identified         PLAN     Recommended plan for no diet, medication or health factor changes affecting INR     Dosing Instructions: Continue your current warfarin dose with next INR in 6 weeks (due to Holiday in 5 weeks)      Summary  As of 7/29/2025      Full warfarin instructions:  5 mg every Sun, Tue, Thu; 2.5 mg all other days   Next INR check:  9/9/2025               Sent [employer] message with dosing and follow up instructions    Contact [phone] to schedule and with any changes, questions or concerns.     Education provided: Please call back if any changes to your diet, medications or how you've been taking warfarin    Plan made per [facility] anticoagulation protocol    Marii Temple RN  7/29/2025  Anticoagulation Clinic  [employer] for routing messages: arnaud BORREGO  [facility] patient phone line: [phone]        _______________________________________________________________________     Anticoagulation Episode Summary       Current INR goal:  2.0-3.0   TTR:  81.3% (1 y)   Target end date:  Indefinite   Send INR reminders to:  LACHELLE BORREGO    Indications    Long term current use of anticoagulant [Z79.01]  Cerebral artery occlusion with cerebral infarction (H) (Resolved) [I63.50]  Primary hypercoagulable state [D68.59]  History of stroke [Z86.73]             Comments:  --             Anticoagulation Care Providers       Provider Role Specialty Phone number    Karina Shah DO Referring Internal Medicine [phone]

## (undated) DEVICE — TUBING CYSTO/BLADDER IRRIG SET 80" 06544-01

## (undated) DEVICE — PAD PERI INDIV WRAP 11" 2022

## (undated) DEVICE — NDL SPINAL 22GA 3.5" QUINCKE 405181

## (undated) DEVICE — DRSG TELFA 3X8" 1238

## (undated) DEVICE — SOL NACL 0.9% IRRIG 1000ML BOTTLE 07138-09

## (undated) DEVICE — LUBRICATING JELLY 4.25OZ

## (undated) DEVICE — GOWN IMPERVIOUS SPECIALTY XLG/XLONG 32474

## (undated) DEVICE — PACK LAPAROSCOPY/PELVISCOPY STD

## (undated) DEVICE — DEVICE ENDOMETRIAL ABLATION SYS MINERVA HANDPIECE MIN9770

## (undated) DEVICE — CATH INTERMITTENT CLEAN-CATH FEMALE 14FR 6" VINYL LF 420614

## (undated) DEVICE — STOCKING SLEEVE COMPRESSION CALF MED

## (undated) DEVICE — TUBING SUCTION 12"X1/4" N612

## (undated) DEVICE — DECANTER VIAL 2006S

## (undated) DEVICE — GLOVE PROTEXIS W/NEU-THERA 7.5  2D73TE75

## (undated) DEVICE — PAD FLOOR SURGISAFE

## (undated) DEVICE — SOL NACL 0.9% IRRIG 3000ML BAG 2B7477

## (undated) RX ORDER — PROPOFOL 10 MG/ML
INJECTION, EMULSION INTRAVENOUS
Status: DISPENSED
Start: 2024-10-17

## (undated) RX ORDER — ONDANSETRON 2 MG/ML
INJECTION INTRAMUSCULAR; INTRAVENOUS
Status: DISPENSED
Start: 2019-01-28

## (undated) RX ORDER — LIDOCAINE HYDROCHLORIDE 10 MG/ML
INJECTION, SOLUTION EPIDURAL; INFILTRATION; INTRACAUDAL; PERINEURAL
Status: DISPENSED
Start: 2019-01-28

## (undated) RX ORDER — BUPIVACAINE HYDROCHLORIDE 5 MG/ML
INJECTION, SOLUTION PERINEURAL
Status: DISPENSED
Start: 2019-01-28

## (undated) RX ORDER — PROPOFOL 10 MG/ML
INJECTION, EMULSION INTRAVENOUS
Status: DISPENSED
Start: 2019-01-28

## (undated) RX ORDER — FENTANYL CITRATE 50 UG/ML
INJECTION, SOLUTION INTRAMUSCULAR; INTRAVENOUS
Status: DISPENSED
Start: 2019-01-28

## (undated) RX ORDER — GLYCOPYRROLATE 0.2 MG/ML
INJECTION, SOLUTION INTRAMUSCULAR; INTRAVENOUS
Status: DISPENSED
Start: 2024-10-17

## (undated) RX ORDER — DEXAMETHASONE SODIUM PHOSPHATE 4 MG/ML
INJECTION, SOLUTION INTRA-ARTICULAR; INTRALESIONAL; INTRAMUSCULAR; INTRAVENOUS; SOFT TISSUE
Status: DISPENSED
Start: 2019-01-28

## (undated) RX ORDER — KETOROLAC TROMETHAMINE 30 MG/ML
INJECTION, SOLUTION INTRAMUSCULAR; INTRAVENOUS
Status: DISPENSED
Start: 2019-01-28